# Patient Record
Sex: FEMALE | Race: WHITE | NOT HISPANIC OR LATINO | URBAN - METROPOLITAN AREA
[De-identification: names, ages, dates, MRNs, and addresses within clinical notes are randomized per-mention and may not be internally consistent; named-entity substitution may affect disease eponyms.]

---

## 2017-03-23 ENCOUNTER — EMERGENCY (EMERGENCY)
Facility: HOSPITAL | Age: 77
LOS: 1 days | Discharge: HOME | End: 2017-03-23
Attending: EMERGENCY MEDICINE | Admitting: EMERGENCY MEDICINE
Payer: MEDICARE

## 2017-03-23 VITALS
RESPIRATION RATE: 20 BRPM | SYSTOLIC BLOOD PRESSURE: 175 MMHG | HEART RATE: 100 BPM | DIASTOLIC BLOOD PRESSURE: 97 MMHG | WEIGHT: 139.99 LBS | OXYGEN SATURATION: 100 %

## 2017-03-23 VITALS
TEMPERATURE: 98 F | DIASTOLIC BLOOD PRESSURE: 80 MMHG | RESPIRATION RATE: 18 BRPM | SYSTOLIC BLOOD PRESSURE: 165 MMHG | HEART RATE: 95 BPM | OXYGEN SATURATION: 100 %

## 2017-03-23 DIAGNOSIS — R06.02 SHORTNESS OF BREATH: ICD-10-CM

## 2017-03-23 DIAGNOSIS — Z87.891 PERSONAL HISTORY OF NICOTINE DEPENDENCE: ICD-10-CM

## 2017-03-23 DIAGNOSIS — J40 BRONCHITIS, NOT SPECIFIED AS ACUTE OR CHRONIC: ICD-10-CM

## 2017-03-23 LAB
ALBUMIN SERPL ELPH-MCNC: 4.4 G/DL — SIGNIFICANT CHANGE UP (ref 3.3–5)
ALP SERPL-CCNC: 109 U/L — SIGNIFICANT CHANGE UP (ref 40–120)
ALT FLD-CCNC: 21 U/L — SIGNIFICANT CHANGE UP (ref 12–78)
ANION GAP SERPL CALC-SCNC: 12 MMOL/L — SIGNIFICANT CHANGE UP (ref 5–17)
AST SERPL-CCNC: 28 U/L — SIGNIFICANT CHANGE UP (ref 15–37)
BASOPHILS # BLD AUTO: 0 K/UL — SIGNIFICANT CHANGE UP (ref 0–0.2)
BASOPHILS NFR BLD AUTO: 0.4 % — SIGNIFICANT CHANGE UP (ref 0–2)
BILIRUB SERPL-MCNC: 0.7 MG/DL — SIGNIFICANT CHANGE UP (ref 0.2–1.2)
BUN SERPL-MCNC: 22 MG/DL — SIGNIFICANT CHANGE UP (ref 7–23)
CALCIUM SERPL-MCNC: 9.5 MG/DL — SIGNIFICANT CHANGE UP (ref 8.5–10.1)
CHLORIDE SERPL-SCNC: 106 MMOL/L — SIGNIFICANT CHANGE UP (ref 96–108)
CO2 SERPL-SCNC: 24 MMOL/L — SIGNIFICANT CHANGE UP (ref 22–31)
CREAT SERPL-MCNC: 0.61 MG/DL — SIGNIFICANT CHANGE UP (ref 0.5–1.3)
EOSINOPHIL # BLD AUTO: 0.1 K/UL — SIGNIFICANT CHANGE UP (ref 0–0.5)
EOSINOPHIL NFR BLD AUTO: 1.1 % — SIGNIFICANT CHANGE UP (ref 0–6)
GLUCOSE SERPL-MCNC: 91 MG/DL — SIGNIFICANT CHANGE UP (ref 70–99)
HCT VFR BLD CALC: 46.7 % — HIGH (ref 34.5–45)
HGB BLD-MCNC: 15.4 G/DL — SIGNIFICANT CHANGE UP (ref 11.5–15.5)
LYMPHOCYTES # BLD AUTO: 0.9 K/UL — LOW (ref 1–3.3)
LYMPHOCYTES # BLD AUTO: 8 % — LOW (ref 13–44)
MCHC RBC-ENTMCNC: 31.3 PG — SIGNIFICANT CHANGE UP (ref 27–34)
MCHC RBC-ENTMCNC: 32.9 GM/DL — SIGNIFICANT CHANGE UP (ref 32–36)
MCV RBC AUTO: 95.1 FL — SIGNIFICANT CHANGE UP (ref 80–100)
MONOCYTES # BLD AUTO: 0.7 K/UL — SIGNIFICANT CHANGE UP (ref 0–0.9)
MONOCYTES NFR BLD AUTO: 6.3 % — SIGNIFICANT CHANGE UP (ref 1–9)
NEUTROPHILS # BLD AUTO: 9 K/UL — HIGH (ref 1.8–7.4)
NEUTROPHILS NFR BLD AUTO: 84.2 % — HIGH (ref 43–77)
PLATELET # BLD AUTO: 213 K/UL — SIGNIFICANT CHANGE UP (ref 150–400)
POTASSIUM SERPL-MCNC: 4 MMOL/L — SIGNIFICANT CHANGE UP (ref 3.5–5.3)
POTASSIUM SERPL-SCNC: 4 MMOL/L — SIGNIFICANT CHANGE UP (ref 3.5–5.3)
PROT SERPL-MCNC: 7.9 G/DL — SIGNIFICANT CHANGE UP (ref 6–8.3)
RBC # BLD: 4.91 M/UL — SIGNIFICANT CHANGE UP (ref 3.8–5.2)
RBC # FLD: 11.9 % — SIGNIFICANT CHANGE UP (ref 10.3–14.5)
SODIUM SERPL-SCNC: 142 MMOL/L — SIGNIFICANT CHANGE UP (ref 135–145)
WBC # BLD: 10.7 K/UL — HIGH (ref 3.8–10.5)
WBC # FLD AUTO: 10.7 K/UL — HIGH (ref 3.8–10.5)

## 2017-03-23 PROCEDURE — 96367 TX/PROPH/DG ADDL SEQ IV INF: CPT

## 2017-03-23 PROCEDURE — 99285 EMERGENCY DEPT VISIT HI MDM: CPT

## 2017-03-23 PROCEDURE — 96365 THER/PROPH/DIAG IV INF INIT: CPT

## 2017-03-23 PROCEDURE — 80053 COMPREHEN METABOLIC PANEL: CPT

## 2017-03-23 PROCEDURE — 85027 COMPLETE CBC AUTOMATED: CPT

## 2017-03-23 PROCEDURE — 94640 AIRWAY INHALATION TREATMENT: CPT

## 2017-03-23 PROCEDURE — 71045 X-RAY EXAM CHEST 1 VIEW: CPT

## 2017-03-23 PROCEDURE — 71010: CPT | Mod: 26

## 2017-03-23 PROCEDURE — 99284 EMERGENCY DEPT VISIT MOD MDM: CPT | Mod: 25

## 2017-03-23 RX ORDER — IPRATROPIUM BROMIDE 0.2 MG/ML
500 SOLUTION, NON-ORAL INHALATION ONCE
Qty: 0 | Refills: 0 | Status: COMPLETED | OUTPATIENT
Start: 2017-03-23 | End: 2017-03-23

## 2017-03-23 RX ORDER — ALBUTEROL 90 UG/1
2.5 AEROSOL, METERED ORAL ONCE
Qty: 0 | Refills: 0 | Status: COMPLETED | OUTPATIENT
Start: 2017-03-23 | End: 2017-03-23

## 2017-03-23 RX ORDER — ALBUTEROL 90 UG/1
1 AEROSOL, METERED ORAL
Qty: 84 | Refills: 0
Start: 2017-03-23 | End: 2017-04-06

## 2017-03-23 RX ORDER — FLUTICASONE PROPIONATE 220 MCG
2 AEROSOL WITH ADAPTER (GRAM) INHALATION
Qty: 56 | Refills: 0
Start: 2017-03-23 | End: 2017-04-06

## 2017-03-23 RX ORDER — AZITHROMYCIN 500 MG/1
1 TABLET, FILM COATED ORAL
Qty: 4 | Refills: 0 | OUTPATIENT
Start: 2017-03-23 | End: 2017-03-27

## 2017-03-23 RX ORDER — IPRATROPIUM/ALBUTEROL SULFATE 18-103MCG
3 AEROSOL WITH ADAPTER (GRAM) INHALATION ONCE
Qty: 0 | Refills: 0 | Status: COMPLETED | OUTPATIENT
Start: 2017-03-23 | End: 2017-03-23

## 2017-03-23 RX ORDER — CEFTRIAXONE 500 MG/1
1 INJECTION, POWDER, FOR SOLUTION INTRAMUSCULAR; INTRAVENOUS ONCE
Qty: 0 | Refills: 0 | Status: COMPLETED | OUTPATIENT
Start: 2017-03-23 | End: 2017-03-23

## 2017-03-23 RX ORDER — AZITHROMYCIN 500 MG/1
500 TABLET, FILM COATED ORAL ONCE
Qty: 0 | Refills: 0 | Status: COMPLETED | OUTPATIENT
Start: 2017-03-23 | End: 2017-03-23

## 2017-03-23 RX ADMIN — ALBUTEROL 2.5 MILLIGRAM(S): 90 AEROSOL, METERED ORAL at 19:58

## 2017-03-23 RX ADMIN — Medication 500 MICROGRAM(S): at 19:57

## 2017-03-23 RX ADMIN — Medication 3 MILLILITER(S): at 19:58

## 2017-03-23 RX ADMIN — Medication 3 MILLILITER(S): at 19:57

## 2017-03-23 RX ADMIN — AZITHROMYCIN 255 MILLIGRAM(S): 500 TABLET, FILM COATED ORAL at 19:59

## 2017-03-23 RX ADMIN — CEFTRIAXONE 100 GRAM(S): 500 INJECTION, POWDER, FOR SOLUTION INTRAMUSCULAR; INTRAVENOUS at 19:59

## 2017-03-23 NOTE — ED ADULT NURSE NOTE - OBJECTIVE STATEMENT
brought in by ems s/p sudden onset of sob.  as per ems patient is much better now.  speaking in full sentences.  mild expiratory wheeze.  md assessing patient

## 2017-03-23 NOTE — ED PROVIDER NOTE - PROGRESS NOTE DETAILS
All results were explained to patient and/or family and a copy of all available results given.  Pt felt better.

## 2017-03-27 ENCOUNTER — INPATIENT (INPATIENT)
Facility: HOSPITAL | Age: 77
LOS: 2 days | Discharge: HOME | DRG: 203 | End: 2017-03-30
Attending: INTERNAL MEDICINE | Admitting: INTERNAL MEDICINE
Payer: MEDICARE

## 2017-03-27 VITALS
WEIGHT: 110.01 LBS | OXYGEN SATURATION: 97 % | SYSTOLIC BLOOD PRESSURE: 163 MMHG | TEMPERATURE: 97 F | DIASTOLIC BLOOD PRESSURE: 80 MMHG | RESPIRATION RATE: 22 BRPM | HEIGHT: 62 IN | HEART RATE: 76 BPM

## 2017-03-27 DIAGNOSIS — I10 ESSENTIAL (PRIMARY) HYPERTENSION: ICD-10-CM

## 2017-03-27 LAB
ALBUMIN SERPL ELPH-MCNC: 4 G/DL — SIGNIFICANT CHANGE UP (ref 3.3–5)
ALP SERPL-CCNC: 94 U/L — SIGNIFICANT CHANGE UP (ref 40–120)
ALT FLD-CCNC: 23 U/L — SIGNIFICANT CHANGE UP (ref 12–78)
ANION GAP SERPL CALC-SCNC: 11 MMOL/L — SIGNIFICANT CHANGE UP (ref 5–17)
AST SERPL-CCNC: 17 U/L — SIGNIFICANT CHANGE UP (ref 15–37)
BASOPHILS # BLD AUTO: 0.1 K/UL — SIGNIFICANT CHANGE UP (ref 0–0.2)
BASOPHILS NFR BLD AUTO: 0.8 % — SIGNIFICANT CHANGE UP (ref 0–2)
BILIRUB SERPL-MCNC: 0.6 MG/DL — SIGNIFICANT CHANGE UP (ref 0.2–1.2)
BUN SERPL-MCNC: 23 MG/DL — SIGNIFICANT CHANGE UP (ref 7–23)
CALCIUM SERPL-MCNC: 9.5 MG/DL — SIGNIFICANT CHANGE UP (ref 8.5–10.1)
CHLORIDE SERPL-SCNC: 106 MMOL/L — SIGNIFICANT CHANGE UP (ref 96–108)
CK SERPL-CCNC: 49 U/L — SIGNIFICANT CHANGE UP (ref 26–192)
CO2 SERPL-SCNC: 25 MMOL/L — SIGNIFICANT CHANGE UP (ref 22–31)
CREAT SERPL-MCNC: 0.78 MG/DL — SIGNIFICANT CHANGE UP (ref 0.5–1.3)
EOSINOPHIL # BLD AUTO: 0.1 K/UL — SIGNIFICANT CHANGE UP (ref 0–0.5)
EOSINOPHIL NFR BLD AUTO: 0.8 % — SIGNIFICANT CHANGE UP (ref 0–6)
GLUCOSE SERPL-MCNC: 91 MG/DL — SIGNIFICANT CHANGE UP (ref 70–99)
HCT VFR BLD CALC: 47.2 % — HIGH (ref 34.5–45)
HGB BLD-MCNC: 15.5 G/DL — SIGNIFICANT CHANGE UP (ref 11.5–15.5)
LYMPHOCYTES # BLD AUTO: 3.6 K/UL — HIGH (ref 1–3.3)
LYMPHOCYTES # BLD AUTO: 35.8 % — SIGNIFICANT CHANGE UP (ref 13–44)
MCHC RBC-ENTMCNC: 30.5 PG — SIGNIFICANT CHANGE UP (ref 27–34)
MCHC RBC-ENTMCNC: 32.8 GM/DL — SIGNIFICANT CHANGE UP (ref 32–36)
MCV RBC AUTO: 93 FL — SIGNIFICANT CHANGE UP (ref 80–100)
MONOCYTES # BLD AUTO: 0.9 K/UL — SIGNIFICANT CHANGE UP (ref 0–0.9)
MONOCYTES NFR BLD AUTO: 8.9 % — SIGNIFICANT CHANGE UP (ref 1–9)
NEUTROPHILS # BLD AUTO: 5.3 K/UL — SIGNIFICANT CHANGE UP (ref 1.8–7.4)
NEUTROPHILS NFR BLD AUTO: 53.6 % — SIGNIFICANT CHANGE UP (ref 43–77)
PLATELET # BLD AUTO: 264 K/UL — SIGNIFICANT CHANGE UP (ref 150–400)
POTASSIUM SERPL-MCNC: 3.4 MMOL/L — LOW (ref 3.5–5.3)
POTASSIUM SERPL-SCNC: 3.4 MMOL/L — LOW (ref 3.5–5.3)
PROT SERPL-MCNC: 7.1 G/DL — SIGNIFICANT CHANGE UP (ref 6–8.3)
RBC # BLD: 5.07 M/UL — SIGNIFICANT CHANGE UP (ref 3.8–5.2)
RBC # FLD: 11.8 % — SIGNIFICANT CHANGE UP (ref 10.3–14.5)
SODIUM SERPL-SCNC: 142 MMOL/L — SIGNIFICANT CHANGE UP (ref 135–145)
TROPONIN I SERPL-MCNC: 0.07 NG/ML — HIGH (ref 0.01–0.04)
WBC # BLD: 10 K/UL — SIGNIFICANT CHANGE UP (ref 3.8–10.5)
WBC # FLD AUTO: 10 K/UL — SIGNIFICANT CHANGE UP (ref 3.8–10.5)

## 2017-03-27 PROCEDURE — 93010 ELECTROCARDIOGRAM REPORT: CPT

## 2017-03-27 RX ORDER — ASPIRIN/CALCIUM CARB/MAGNESIUM 325 MG
325 TABLET ORAL ONCE
Qty: 0 | Refills: 0 | Status: COMPLETED | OUTPATIENT
Start: 2017-03-27 | End: 2017-03-27

## 2017-03-27 NOTE — ED ADULT NURSE NOTE - OBJECTIVE STATEMENT
pt states she developed "shakes and palpitations " after taking 2pm flovent inhaler dose. stated symptoms persisted until 1800. pt denies shortness of breath at this time.

## 2017-03-27 NOTE — ED PROVIDER NOTE - CARE PLAN
Principal Discharge DX:	Palpitations  Secondary Diagnosis:	Shortness of breath Principal Discharge DX:	NSTEMI (non-ST elevated myocardial infarction)  Secondary Diagnosis:	Shortness of breath

## 2017-03-27 NOTE — ED PROVIDER NOTE - OBJECTIVE STATEMENT
A 76 year old female came to the ED SOB, lightheadedness, chills and A 76 year old female with a pmh of asthma came to the ED with SOB, lightheadedness, chills and an irregular heartbeat. The patient was in the ED 4 days ago with SOB and was prescribed steroids, a zpack and an inhaler. The patient states her symptoms were improving, until she took her flovent inhaler today and began feeling the symptoms she presented with. A 76 year old female with a pmh of asthma came to the ED with SOB, lightheadedness, chills and an irregular heartbeat. The patient was in the ED 4 days ago with SOB and was prescribed steroids, a zpack and an inhaler. The patient states her symptoms were improving, until she took her flovent inhaler today and began feeling the symptoms she presented with. The patient also complains of a lack of appetite She denies fevers, chest pain, N/V/D/C, abdominal pain.

## 2017-03-27 NOTE — ED PROVIDER NOTE - PROGRESS NOTE DETAILS
patient to be seen by cardiology Dr. Alejo, aware of positive troponin pt seen by dr armirez, second CE is higher than the first, so pt will need admission. dr ramirez aware

## 2017-03-28 DIAGNOSIS — J45.909 UNSPECIFIED ASTHMA, UNCOMPLICATED: ICD-10-CM

## 2017-03-28 DIAGNOSIS — I21.4 NON-ST ELEVATION (NSTEMI) MYOCARDIAL INFARCTION: ICD-10-CM

## 2017-03-28 LAB
ALBUMIN SERPL ELPH-MCNC: 3.4 G/DL — SIGNIFICANT CHANGE UP (ref 3.3–5)
ALP SERPL-CCNC: 78 U/L — SIGNIFICANT CHANGE UP (ref 40–120)
ALT FLD-CCNC: 19 U/L — SIGNIFICANT CHANGE UP (ref 12–78)
ANION GAP SERPL CALC-SCNC: 9 MMOL/L — SIGNIFICANT CHANGE UP (ref 5–17)
AST SERPL-CCNC: 14 U/L — LOW (ref 15–37)
BILIRUB SERPL-MCNC: 0.6 MG/DL — SIGNIFICANT CHANGE UP (ref 0.2–1.2)
BUN SERPL-MCNC: 21 MG/DL — SIGNIFICANT CHANGE UP (ref 7–23)
CALCIUM SERPL-MCNC: 8.4 MG/DL — LOW (ref 8.5–10.1)
CHLORIDE SERPL-SCNC: 108 MMOL/L — SIGNIFICANT CHANGE UP (ref 96–108)
CHOLEST SERPL-MCNC: 214 MG/DL — HIGH (ref 10–199)
CK MB BLD-MCNC: 4.6 % — HIGH (ref 0–3.5)
CK MB CFR SERPL CALC: 1.9 NG/ML — SIGNIFICANT CHANGE UP (ref 0–3.6)
CK SERPL-CCNC: 38 U/L — SIGNIFICANT CHANGE UP (ref 26–192)
CK SERPL-CCNC: 41 U/L — SIGNIFICANT CHANGE UP (ref 26–192)
CO2 SERPL-SCNC: 26 MMOL/L — SIGNIFICANT CHANGE UP (ref 22–31)
CREAT SERPL-MCNC: 0.75 MG/DL — SIGNIFICANT CHANGE UP (ref 0.5–1.3)
GLUCOSE SERPL-MCNC: 86 MG/DL — SIGNIFICANT CHANGE UP (ref 70–99)
HCT VFR BLD CALC: 40.6 % — SIGNIFICANT CHANGE UP (ref 34.5–45)
HDLC SERPL-MCNC: 64 MG/DL — SIGNIFICANT CHANGE UP (ref 40–125)
HGB BLD-MCNC: 13.4 G/DL — SIGNIFICANT CHANGE UP (ref 11.5–15.5)
INR BLD: 1.13 RATIO — SIGNIFICANT CHANGE UP (ref 0.88–1.16)
LIPID PNL WITH DIRECT LDL SERPL: 135 MG/DL — HIGH
MAGNESIUM SERPL-MCNC: 2.1 MG/DL — SIGNIFICANT CHANGE UP (ref 1.8–2.4)
MCHC RBC-ENTMCNC: 30.7 PG — SIGNIFICANT CHANGE UP (ref 27–34)
MCHC RBC-ENTMCNC: 33 GM/DL — SIGNIFICANT CHANGE UP (ref 32–36)
MCV RBC AUTO: 93.2 FL — SIGNIFICANT CHANGE UP (ref 80–100)
PLATELET # BLD AUTO: 211 K/UL — SIGNIFICANT CHANGE UP (ref 150–400)
POTASSIUM SERPL-MCNC: 3.8 MMOL/L — SIGNIFICANT CHANGE UP (ref 3.5–5.3)
POTASSIUM SERPL-SCNC: 3.8 MMOL/L — SIGNIFICANT CHANGE UP (ref 3.5–5.3)
PROCALCITONIN SERPL-MCNC: <0.05 NG/ML — SIGNIFICANT CHANGE UP (ref 0–0.05)
PROT SERPL-MCNC: 6.1 G/DL — SIGNIFICANT CHANGE UP (ref 6–8.3)
PROTHROM AB SERPL-ACNC: 12.4 SEC — SIGNIFICANT CHANGE UP (ref 9.8–12.7)
RAPID RVP RESULT: SIGNIFICANT CHANGE UP
RBC # BLD: 4.36 M/UL — SIGNIFICANT CHANGE UP (ref 3.8–5.2)
RBC # FLD: 11.8 % — SIGNIFICANT CHANGE UP (ref 10.3–14.5)
SALICYLATES SERPL-MCNC: 2.6 MG/DL — LOW (ref 2.8–20)
SODIUM SERPL-SCNC: 143 MMOL/L — SIGNIFICANT CHANGE UP (ref 135–145)
TOTAL CHOLESTEROL/HDL RATIO MEASUREMENT: 3.3 RATIO — SIGNIFICANT CHANGE UP (ref 3.3–7.1)
TRIGL SERPL-MCNC: 76 MG/DL — SIGNIFICANT CHANGE UP (ref 10–149)
TROPONIN I SERPL-MCNC: 0.08 NG/ML — HIGH (ref 0.01–0.04)
TROPONIN I SERPL-MCNC: 0.09 NG/ML — HIGH (ref 0.01–0.04)
TSH SERPL-MCNC: 2.28 UIU/ML — SIGNIFICANT CHANGE UP (ref 0.36–3.74)
WBC # BLD: 7.8 K/UL — SIGNIFICANT CHANGE UP (ref 3.8–10.5)
WBC # FLD AUTO: 7.8 K/UL — SIGNIFICANT CHANGE UP (ref 3.8–10.5)

## 2017-03-28 PROCEDURE — 71010: CPT | Mod: 26

## 2017-03-28 PROCEDURE — 93010 ELECTROCARDIOGRAM REPORT: CPT

## 2017-03-28 PROCEDURE — 93970 EXTREMITY STUDY: CPT | Mod: 26

## 2017-03-28 PROCEDURE — 99285 EMERGENCY DEPT VISIT HI MDM: CPT

## 2017-03-28 RX ORDER — ENOXAPARIN SODIUM 100 MG/ML
50 INJECTION SUBCUTANEOUS EVERY 12 HOURS
Qty: 0 | Refills: 0 | Status: DISCONTINUED | OUTPATIENT
Start: 2017-03-28 | End: 2017-03-30

## 2017-03-28 RX ORDER — ASPIRIN/CALCIUM CARB/MAGNESIUM 324 MG
325 TABLET ORAL DAILY
Qty: 0 | Refills: 0 | Status: DISCONTINUED | OUTPATIENT
Start: 2017-03-28 | End: 2017-03-30

## 2017-03-28 RX ORDER — POTASSIUM CHLORIDE 20 MEQ
40 PACKET (EA) ORAL ONCE
Qty: 0 | Refills: 0 | Status: COMPLETED | OUTPATIENT
Start: 2017-03-28 | End: 2017-03-28

## 2017-03-28 RX ORDER — METOPROLOL TARTRATE 50 MG
12.5 TABLET ORAL THREE TIMES A DAY
Qty: 0 | Refills: 0 | Status: DISCONTINUED | OUTPATIENT
Start: 2017-03-28 | End: 2017-03-30

## 2017-03-28 RX ORDER — ALBUTEROL 90 UG/1
2 AEROSOL, METERED ORAL EVERY 6 HOURS
Qty: 0 | Refills: 0 | Status: DISCONTINUED | OUTPATIENT
Start: 2017-03-28 | End: 2017-03-28

## 2017-03-28 RX ORDER — PANTOPRAZOLE SODIUM 20 MG/1
40 TABLET, DELAYED RELEASE ORAL
Qty: 0 | Refills: 0 | Status: DISCONTINUED | OUTPATIENT
Start: 2017-03-28 | End: 2017-03-30

## 2017-03-28 RX ORDER — ATORVASTATIN CALCIUM 80 MG/1
20 TABLET, FILM COATED ORAL AT BEDTIME
Qty: 0 | Refills: 0 | Status: DISCONTINUED | OUTPATIENT
Start: 2017-03-28 | End: 2017-03-30

## 2017-03-28 RX ORDER — IPRATROPIUM/ALBUTEROL SULFATE 18-103MCG
3 AEROSOL WITH ADAPTER (GRAM) INHALATION
Qty: 0 | Refills: 0 | Status: DISCONTINUED | OUTPATIENT
Start: 2017-03-28 | End: 2017-03-30

## 2017-03-28 RX ORDER — BUDESONIDE, MICRONIZED 100 %
0.5 POWDER (GRAM) MISCELLANEOUS
Qty: 0 | Refills: 0 | Status: DISCONTINUED | OUTPATIENT
Start: 2017-03-28 | End: 2017-03-30

## 2017-03-28 RX ADMIN — Medication 40 MILLIEQUIVALENT(S): at 02:06

## 2017-03-28 RX ADMIN — Medication 12.5 MILLIGRAM(S): at 05:26

## 2017-03-28 RX ADMIN — ENOXAPARIN SODIUM 50 MILLIGRAM(S): 100 INJECTION SUBCUTANEOUS at 17:24

## 2017-03-28 RX ADMIN — Medication 12.5 MILLIGRAM(S): at 21:57

## 2017-03-28 RX ADMIN — ENOXAPARIN SODIUM 50 MILLIGRAM(S): 100 INJECTION SUBCUTANEOUS at 05:25

## 2017-03-28 RX ADMIN — Medication 0.5 MILLIGRAM(S): at 21:08

## 2017-03-28 RX ADMIN — Medication 325 MILLIGRAM(S): at 02:06

## 2017-03-28 RX ADMIN — Medication 3 MILLILITER(S): at 21:07

## 2017-03-28 RX ADMIN — PANTOPRAZOLE SODIUM 40 MILLIGRAM(S): 20 TABLET, DELAYED RELEASE ORAL at 05:26

## 2017-03-28 RX ADMIN — Medication 40 MILLIGRAM(S): at 22:01

## 2017-03-28 RX ADMIN — Medication 3 MILLILITER(S): at 15:25

## 2017-03-28 RX ADMIN — Medication 12.5 MILLIGRAM(S): at 13:22

## 2017-03-28 RX ADMIN — ATORVASTATIN CALCIUM 20 MILLIGRAM(S): 80 TABLET, FILM COATED ORAL at 21:58

## 2017-03-28 RX ADMIN — Medication 325 MILLIGRAM(S): at 11:19

## 2017-03-28 NOTE — H&P ADULT. - ASSESSMENT
76 year old female with a pmh of asthma came to the ED with SOB, lightheadedness, chills and an irregular heartbeat. The patient was in the ED 4 days ago with SOB and was prescribed steroids, a zpack and an inhaler. The patient states her symptoms were improving, until she took her flovent inhaler today and began feeling the symptoms she presented with. The patient also complains of a lack of appetite She denies fevers, chest pain, N/V/D/C, abdominal pain.

## 2017-03-28 NOTE — PATIENT PROFILE ADULT. - CAREGIVER ADDRESS
[address] [facility], [address] [facility], [place] [address]

## 2017-03-29 DIAGNOSIS — R06.02 SHORTNESS OF BREATH: ICD-10-CM

## 2017-03-29 LAB
ALBUMIN SERPL ELPH-MCNC: 3.6 G/DL — SIGNIFICANT CHANGE UP (ref 3.3–5)
ALP SERPL-CCNC: 87 U/L — SIGNIFICANT CHANGE UP (ref 40–120)
ALT FLD-CCNC: 20 U/L — SIGNIFICANT CHANGE UP (ref 12–78)
ANION GAP SERPL CALC-SCNC: 8 MMOL/L — SIGNIFICANT CHANGE UP (ref 5–17)
AST SERPL-CCNC: 8 U/L — LOW (ref 15–37)
BILIRUB SERPL-MCNC: 0.5 MG/DL — SIGNIFICANT CHANGE UP (ref 0.2–1.2)
BUN SERPL-MCNC: 21 MG/DL — SIGNIFICANT CHANGE UP (ref 7–23)
CALCIUM SERPL-MCNC: 8.6 MG/DL — SIGNIFICANT CHANGE UP (ref 8.5–10.1)
CHLORIDE SERPL-SCNC: 107 MMOL/L — SIGNIFICANT CHANGE UP (ref 96–108)
CHOLEST SERPL-MCNC: 235 MG/DL — HIGH (ref 10–199)
CO2 SERPL-SCNC: 25 MMOL/L — SIGNIFICANT CHANGE UP (ref 22–31)
CREAT SERPL-MCNC: 0.7 MG/DL — SIGNIFICANT CHANGE UP (ref 0.5–1.3)
GLUCOSE SERPL-MCNC: 131 MG/DL — HIGH (ref 70–99)
HCT VFR BLD CALC: 42.7 % — SIGNIFICANT CHANGE UP (ref 34.5–45)
HDLC SERPL-MCNC: 73 MG/DL — SIGNIFICANT CHANGE UP (ref 40–125)
HGB BLD-MCNC: 14.4 G/DL — SIGNIFICANT CHANGE UP (ref 11.5–15.5)
INR BLD: 1.09 RATIO — SIGNIFICANT CHANGE UP (ref 0.88–1.16)
LIPID PNL WITH DIRECT LDL SERPL: 150 MG/DL — HIGH
MAGNESIUM SERPL-MCNC: 2.2 MG/DL — SIGNIFICANT CHANGE UP (ref 1.8–2.4)
MCHC RBC-ENTMCNC: 31.3 PG — SIGNIFICANT CHANGE UP (ref 27–34)
MCHC RBC-ENTMCNC: 33.9 GM/DL — SIGNIFICANT CHANGE UP (ref 32–36)
MCV RBC AUTO: 92.5 FL — SIGNIFICANT CHANGE UP (ref 80–100)
PHOSPHATE SERPL-MCNC: 2.9 MG/DL — SIGNIFICANT CHANGE UP (ref 2.5–4.5)
PLATELET # BLD AUTO: 232 K/UL — SIGNIFICANT CHANGE UP (ref 150–400)
POTASSIUM SERPL-MCNC: 4.3 MMOL/L — SIGNIFICANT CHANGE UP (ref 3.5–5.3)
POTASSIUM SERPL-SCNC: 4.3 MMOL/L — SIGNIFICANT CHANGE UP (ref 3.5–5.3)
PROT SERPL-MCNC: 6.8 G/DL — SIGNIFICANT CHANGE UP (ref 6–8.3)
PROTHROM AB SERPL-ACNC: 11.9 SEC — SIGNIFICANT CHANGE UP (ref 9.8–12.7)
RBC # BLD: 4.62 M/UL — SIGNIFICANT CHANGE UP (ref 3.8–5.2)
RBC # FLD: 11.8 % — SIGNIFICANT CHANGE UP (ref 10.3–14.5)
SODIUM SERPL-SCNC: 140 MMOL/L — SIGNIFICANT CHANGE UP (ref 135–145)
TOTAL CHOLESTEROL/HDL RATIO MEASUREMENT: 3.2 RATIO — LOW (ref 3.3–7.1)
TRIGL SERPL-MCNC: 59 MG/DL — SIGNIFICANT CHANGE UP (ref 10–149)
TROPONIN I SERPL-MCNC: 0.03 NG/ML — SIGNIFICANT CHANGE UP (ref 0.01–0.04)
WBC # BLD: 5.5 K/UL — SIGNIFICANT CHANGE UP (ref 3.8–10.5)
WBC # FLD AUTO: 5.5 K/UL — SIGNIFICANT CHANGE UP (ref 3.8–10.5)

## 2017-03-29 RX ADMIN — ENOXAPARIN SODIUM 50 MILLIGRAM(S): 100 INJECTION SUBCUTANEOUS at 17:50

## 2017-03-29 RX ADMIN — Medication 325 MILLIGRAM(S): at 11:21

## 2017-03-29 RX ADMIN — PANTOPRAZOLE SODIUM 40 MILLIGRAM(S): 20 TABLET, DELAYED RELEASE ORAL at 06:55

## 2017-03-29 RX ADMIN — Medication 12.5 MILLIGRAM(S): at 21:18

## 2017-03-29 RX ADMIN — ATORVASTATIN CALCIUM 20 MILLIGRAM(S): 80 TABLET, FILM COATED ORAL at 21:17

## 2017-03-29 RX ADMIN — Medication 20 MILLIGRAM(S): at 21:18

## 2017-03-29 RX ADMIN — Medication 12.5 MILLIGRAM(S): at 06:54

## 2017-03-29 RX ADMIN — Medication 40 MILLIGRAM(S): at 06:54

## 2017-03-29 RX ADMIN — ENOXAPARIN SODIUM 50 MILLIGRAM(S): 100 INJECTION SUBCUTANEOUS at 06:54

## 2017-03-29 RX ADMIN — Medication 12.5 MILLIGRAM(S): at 16:09

## 2017-03-29 NOTE — PHYSICAL THERAPY INITIAL EVALUATION ADULT - ADDITIONAL COMMENTS
Pt seems like a poor historian. Pt states that she lives alone and has 15 steps to her bedroom. + driving

## 2017-03-29 NOTE — PROGRESS NOTE ADULT - SUBJECTIVE AND OBJECTIVE BOX
Patient seen and examined. Chart reviewed. Detailed note(s) to follow.         Patient: ANIRUDH RICHARDSON  Female  [id number]    Pulmonary/Critical Care Note Dr Claudio Shafer (Ph )    REVIEW OF SYSTEMS:  Eyes: No eye pain, visual disturbances, or discharge  ENT:  No difficulty hearing, tinnitus, vertigo; No sinus or throat pain  Neck: No pain or stiffness  Breasts: No pain, masses or nipple discharge  Genitourinary: Unremarkable for dysuria, frequency, hematuria or incontinence  Rectal: Unremarkable for pain, hemorrhoids or incontinence  Neurological: No new deficits  Unremarkable for headaches, memory loss, loss of strength, numbness or tremors  Skin: Unremarkable for itching, burning, rashes or lesions   Lymph Nodes: No enlarged glands  Endocrine: Unremarkable for heat or cold intolerance; No hair loss  Musculoskeletal: Unremarkable for joint pain or swelling; No muscle, back or extremity pain  Psychiatric: Unremarkable for depression, anxiety, mood swings or difficulty sleeping  Heme/Lymph: Unremarkable for easy bruising or bleeding gums  Allergy and Immunologic: No hives or eczema  Respiratory: No cough, wheezing, chills or hemoptysis Does have dyspnea worse on exertion  Cardiovascular: No chest pain, palpitations,  dizziness   Constitutional: No fever, weight loss or fatigue  Gastrointestinal: No diarrhea or constipation. No melena or hematochezia.    PHYSICAL EXAM:    Constitutional: NAD,   HEENT: PERRLA, EOMI, Normal Hearing, MMM  Neck: No LAD, No JVD  Back: Normal spine flexure, No CVA tenderness   Cardiovascular: S1 and S2, RRR, no M/G/R  Gastrointestinal: BS+, soft, NT/ND  Vascular: 2+ peripheral pulses  Skin: No rashes  Respiratory: Harsh breath sounds Expiration prolonged   Neurological: Sl confused   Extremities: No peripheral edema    T(C): 36.6, Max: 37.1 (03-29 @ 04:55)  HR: 83 (68 - 83)  BP: 102/67 (102/67 - 151/82)  RR: 18 (16 - 18)  SpO2: 97% (96% - 98%)  Wt(kg): --

## 2017-03-29 NOTE — PROGRESS NOTE ADULT - SUBJECTIVE AND OBJECTIVE BOX
Patient is a 76y old  Female who presents with a chief complaint of sob (28 Mar 2017 08:16)      INTERVAL HPI/OVERNIGHT EVENTS:  T(C): 37.1, Max: 37.1 (03-28 @ 16:06)  HR: 71 (64 - 74)  BP: 131/83 (128/71 - 148/80)  RR: 18 (16 - 18)  SpO2: 97% (96% - 99%)  Wt(kg): --  I&O's Summary    I & Os for current day (as of 29 Mar 2017 09:01)  =============================================  IN: 200 ml / OUT: 0 ml / NET: 200 ml      LABS:                        14.4   5.5   )-----------( 232      ( 29 Mar 2017 06:48 )             42.7     29 Mar 2017 06:48    140    |  107    |  21     ----------------------------<  131    4.3     |  25     |  0.70     Ca    8.6        29 Mar 2017 06:48  Mg     2.1       28 Mar 2017 04:35    TPro  6.8    /  Alb  3.6    /  TBili  0.5    /  DBili  x      /  AST  8      /  ALT  20     /  AlkPhos  87     29 Mar 2017 06:48    PT/INR - ( 29 Mar 2017 06:48 )   PT: 11.9 sec;   INR: 1.09 ratio             CAPILLARY BLOOD GLUCOSE            MEDICATIONS  (STANDING):  enoxaparin Injectable 50milliGRAM(s) SubCutaneous every 12 hours  pantoprazole    Tablet 40milliGRAM(s) Oral before breakfast  aspirin enteric coated 325milliGRAM(s) Oral daily  metoprolol 12.5milliGRAM(s) Oral three times a day  atorvastatin 20milliGRAM(s) Oral at bedtime  ALBUTerol/ipratropium for Nebulization 3milliLiter(s) Nebulizer four times a day  buDESOnide   0.5 milliGRAM(s) Respule 0.5milliGRAM(s) Inhalation two times a day  predniSONE   Tablet 20milliGRAM(s) Oral daily    MEDICATIONS  (PRN):      REVIEW OF SYSTEMS:  CONSTITUTIONAL: No fever, weight loss, or fatigue  EYES: No eye pain, visual disturbances, or discharge  ENMT:  No difficulty hearing, tinnitus, vertigo; No sinus or throat pain  NECK: No pain or stiffness  RESPIRATORY: No cough, wheezing, chills or hemoptysis; No shortness of breath  CARDIOVASCULAR: No chest pain, palpitations, dizziness, or leg swelling  GASTROINTESTINAL: No abdominal or epigastric pain. No nausea, vomiting, or hematemesis; No diarrhea or constipation. No melena or hematochezia.  GENITOURINARY: No dysuria, frequency, hematuria, or incontinence  NEUROLOGICAL: No headaches, memory loss, loss of strength, numbness, or tremors  SKIN: No itching, burning, rashes, or lesions   LYMPH NODES: No enlarged glands  ENDOCRINE: No heat or cold intolerance; No hair loss  MUSCULOSKELETAL: No joint pain or swelling; No muscle, back, or extremity pain  PSYCHIATRIC: No depression, anxiety, mood swings, or difficulty sleeping  HEME/LYMPH: No easy bruising, or bleeding gums  ALLERY AND IMMUNOLOGIC: No hives or eczema    RADIOLOGY & ADDITIONAL TESTS:    Imaging Personally Reviewed:  [ ] YES  [ ] NO    Consultant(s) Notes Reviewed:  [ *] YES  [ ] NO    PHYSICAL EXAM:  GENERAL: NAD, well-groomed, well-developed  HEAD:  Atraumatic, Normocephalic  EYES: EOMI, PERRLA, conjunctiva and sclera clear  ENMT: No tonsillar erythema, exudates, or enlargement; Moist mucous membranes, Good dentition, No lesions  NECK: Supple, No JVD, Normal thyroid  NERVOUS SYSTEM:  Alert & Oriented X3, Good concentration; Motor Strength 5/5 B/L upper and lower extremities; DTRs 2+ intact and symmetric  CHEST/LUNG: Clear to percussion bilaterally; No rales, rhonchi, wheezing, or rubs  HEART: Regular rate and rhythm; No murmurs, rubs, or gallops  ABDOMEN: Soft, Nontender, Nondistended; Bowel sounds present  EXTREMITIES:  2+ Peripheral Pulses, No clubbing, cyanosis, or edema  LYMPH: No lymphadenopathy noted  SKIN: No rashes or lesions    Care Discussed with Consultants/Other Providers [* ] YES  [ ] NO

## 2017-03-30 VITALS
DIASTOLIC BLOOD PRESSURE: 70 MMHG | SYSTOLIC BLOOD PRESSURE: 134 MMHG | HEART RATE: 67 BPM | TEMPERATURE: 98 F | RESPIRATION RATE: 18 BRPM | OXYGEN SATURATION: 96 %

## 2017-03-30 PROCEDURE — 82550 ASSAY OF CK (CPK): CPT

## 2017-03-30 PROCEDURE — 87040 BLOOD CULTURE FOR BACTERIA: CPT

## 2017-03-30 PROCEDURE — 84484 ASSAY OF TROPONIN QUANT: CPT

## 2017-03-30 PROCEDURE — 87798 DETECT AGENT NOS DNA AMP: CPT

## 2017-03-30 PROCEDURE — 84100 ASSAY OF PHOSPHORUS: CPT

## 2017-03-30 PROCEDURE — 83735 ASSAY OF MAGNESIUM: CPT

## 2017-03-30 PROCEDURE — 80053 COMPREHEN METABOLIC PANEL: CPT

## 2017-03-30 PROCEDURE — 84145 PROCALCITONIN (PCT): CPT

## 2017-03-30 PROCEDURE — 80061 LIPID PANEL: CPT

## 2017-03-30 PROCEDURE — 97162 PT EVAL MOD COMPLEX 30 MIN: CPT

## 2017-03-30 PROCEDURE — 93306 TTE W/DOPPLER COMPLETE: CPT

## 2017-03-30 PROCEDURE — 96372 THER/PROPH/DIAG INJ SC/IM: CPT | Mod: 59

## 2017-03-30 PROCEDURE — 84443 ASSAY THYROID STIM HORMONE: CPT

## 2017-03-30 PROCEDURE — 99285 EMERGENCY DEPT VISIT HI MDM: CPT | Mod: 25

## 2017-03-30 PROCEDURE — 87581 M.PNEUMON DNA AMP PROBE: CPT

## 2017-03-30 PROCEDURE — 71045 X-RAY EXAM CHEST 1 VIEW: CPT

## 2017-03-30 PROCEDURE — 85027 COMPLETE CBC AUTOMATED: CPT

## 2017-03-30 PROCEDURE — 94760 N-INVAS EAR/PLS OXIMETRY 1: CPT

## 2017-03-30 PROCEDURE — 80307 DRUG TEST PRSMV CHEM ANLYZR: CPT

## 2017-03-30 PROCEDURE — 82553 CREATINE MB FRACTION: CPT

## 2017-03-30 PROCEDURE — 87633 RESP VIRUS 12-25 TARGETS: CPT

## 2017-03-30 PROCEDURE — 87486 CHLMYD PNEUM DNA AMP PROBE: CPT

## 2017-03-30 PROCEDURE — 96376 TX/PRO/DX INJ SAME DRUG ADON: CPT

## 2017-03-30 PROCEDURE — 85610 PROTHROMBIN TIME: CPT

## 2017-03-30 PROCEDURE — G0378: CPT

## 2017-03-30 PROCEDURE — 93970 EXTREMITY STUDY: CPT

## 2017-03-30 PROCEDURE — 93005 ELECTROCARDIOGRAM TRACING: CPT

## 2017-03-30 PROCEDURE — 96374 THER/PROPH/DIAG INJ IV PUSH: CPT

## 2017-03-30 PROCEDURE — 94640 AIRWAY INHALATION TREATMENT: CPT

## 2017-03-30 RX ORDER — PANTOPRAZOLE SODIUM 20 MG/1
1 TABLET, DELAYED RELEASE ORAL
Qty: 30 | Refills: 0 | OUTPATIENT
Start: 2017-03-30 | End: 2017-04-29

## 2017-03-30 RX ORDER — METOPROLOL TARTRATE 50 MG
0.5 TABLET ORAL
Qty: 30 | Refills: 0 | OUTPATIENT
Start: 2017-03-30 | End: 2017-04-29

## 2017-03-30 RX ORDER — ATORVASTATIN CALCIUM 80 MG/1
1 TABLET, FILM COATED ORAL
Qty: 30 | Refills: 0 | OUTPATIENT
Start: 2017-03-30 | End: 2017-04-29

## 2017-03-30 RX ORDER — ENOXAPARIN SODIUM 100 MG/ML
40 INJECTION SUBCUTANEOUS DAILY
Qty: 0 | Refills: 0 | Status: DISCONTINUED | OUTPATIENT
Start: 2017-03-30 | End: 2017-03-30

## 2017-03-30 RX ORDER — PANTOPRAZOLE SODIUM 20 MG/1
1 TABLET, DELAYED RELEASE ORAL
Qty: 30 | Refills: 0
Start: 2017-03-30 | End: 2017-04-29

## 2017-03-30 RX ORDER — METOPROLOL TARTRATE 50 MG
12.5 TABLET ORAL
Qty: 0 | Refills: 0 | Status: DISCONTINUED | OUTPATIENT
Start: 2017-03-30 | End: 2017-03-30

## 2017-03-30 RX ORDER — ASPIRIN/CALCIUM CARB/MAGNESIUM 324 MG
1 TABLET ORAL
Qty: 30 | Refills: 0
Start: 2017-03-30 | End: 2017-04-29

## 2017-03-30 RX ORDER — ASPIRIN/CALCIUM CARB/MAGNESIUM 324 MG
1 TABLET ORAL
Qty: 30 | Refills: 0 | OUTPATIENT
Start: 2017-03-30 | End: 2017-04-29

## 2017-03-30 RX ORDER — ATORVASTATIN CALCIUM 80 MG/1
1 TABLET, FILM COATED ORAL
Qty: 30 | Refills: 0
Start: 2017-03-30 | End: 2017-04-29

## 2017-03-30 RX ORDER — METOPROLOL TARTRATE 50 MG
0.5 TABLET ORAL
Qty: 30 | Refills: 0
Start: 2017-03-30 | End: 2017-04-29

## 2017-03-30 RX ADMIN — Medication 3 MILLILITER(S): at 07:48

## 2017-03-30 RX ADMIN — PANTOPRAZOLE SODIUM 40 MILLIGRAM(S): 20 TABLET, DELAYED RELEASE ORAL at 06:06

## 2017-03-30 RX ADMIN — Medication 0.5 MILLIGRAM(S): at 07:48

## 2017-03-30 RX ADMIN — Medication 12.5 MILLIGRAM(S): at 06:06

## 2017-03-30 RX ADMIN — Medication 20 MILLIGRAM(S): at 06:06

## 2017-03-30 NOTE — DISCHARGE NOTE ADULT - PATIENT PORTAL LINK FT
“You can access the FollowHealth Patient Portal, offered by [facility], by registering with the following website: [email or web address]”

## 2017-03-30 NOTE — PROGRESS NOTE ADULT - SUBJECTIVE AND OBJECTIVE BOX
[facility] DON[facility].CMonique [place]       HPI:  76 year old female with a pmh of asthma came to the ED with SOB, lightheadedness, chills and an irregular heartbeat. The patient was in the ED 4 days ago with SOB and was prescribed steroids, a zpack and an inhaler. The patient states her symptoms were improving, until she took her flovent inhaler today and began feeling the symptoms she presented with. The patient also complains of a lack of appetite She denies fevers, chest pain, N/V/D/C, abdominal pain. (28 Mar 2017 08:16)        SUBJECTIVE: Patient feeling much better . SOB and wheezing and cough has improved significantly . No chest pain .      ALLERGIES:  Allergies    No Known Allergies    Intolerances          MEDICATIONS  (STANDING):  enoxaparin Injectable 50milliGRAM(s) SubCutaneous every 12 hours  pantoprazole    Tablet 40milliGRAM(s) Oral before breakfast  aspirin enteric coated 325milliGRAM(s) Oral daily  metoprolol 12.5milliGRAM(s) Oral three times a day  atorvastatin 20milliGRAM(s) Oral at bedtime  ALBUTerol/ipratropium for Nebulization 3milliLiter(s) Nebulizer four times a day  buDESOnide   0.5 milliGRAM(s) Respule 0.5milliGRAM(s) Inhalation two times a day  predniSONE   Tablet 20milliGRAM(s) Oral daily    MEDICATIONS  (PRN):      REVIEW OF SYSTEMS:  CONSTITUTIONAL: No fever,  RESPIRATORY: Very mild  cough now and  wheezing, shortness of breath and has improved now   CARDIOVASCULAR: No chest pain, dyspnea with moderate extortion  palpitations, dizziness, syncope, paroxysmal nocturnal dyspnea,  or arm or leg swelling  GASTROINTESTINAL: No abdominal  or epigastric pain, nausea, vomiting,  diarrhea  NEUROLOGICAL: No headaches,  loss of strength, numbness, or tremors    Vital Signs Last 24 Hrs  T(C): 36.7, Max: 37.1 (03-29 @ 04:55)  T(F): 98.1, Max: 98.7 (03-29 @ 04:55)  HR: 68 (68 - 83)  BP: 125/75 (102/67 - 157/79)  BP(mean): --  RR: 18 (17 - 19)  SpO2: 99% (97% - 99%)    PHYSICAL EXAM:  HEAD:  Atraumatic, Normocephalic  NECK: Supple and normal thyroid.  No JVD or carotid bruit.   HEART: S1, S2 regular , 1/6 soft ejection systolic murmur in mitral area , no thrill and no gallops .  PULMONARY: Bilateral vesicular breathing , few scattered ronchil and few scattered rales are present .  ABDOMEN: Soft nontender and positive bowl sounds   EXTREMITIES:  No clubbing, cyanosis, or pedal  edema  NEUROLOGICAL: AAOX3 , no focal deficit .    LABS:                        14.4   5.5   )-----------( 232      ( 29 Mar 2017 06:48 )             42.7     29 Mar 2017 06:48    140    |  107    |  21     ----------------------------<  131    4.3     |  25     |  0.70     Ca    8.6        29 Mar 2017 06:48  Phos  2.9       29 Mar 2017 06:48  Mg     2.2       29 Mar 2017 06:48    TPro  6.8    /  Alb  3.6    /  TBili  0.5    /  DBili  x      /  AST  8      /  ALT  20     /  AlkPhos  87     29 Mar 2017 06:48    CARDIAC MARKERS ( 29 Mar 2017 06:48 )  .027 ng/mL / x     / x     / x     / x      CARDIAC MARKERS ( 28 Mar 2017 04:35 )  .076 ng/mL / x     / 38 U/L / x     / x      CARDIAC MARKERS ( 28 Mar 2017 01:30 )  .086 ng/mL / x     / 41 U/L / x     / 1.9 ng/mL      PT/INR - ( 29 Mar 2017 06:48 )   PT: 11.9 sec;   INR: 1.09 ratio             BNP      EKG:Diagnosis Line Normal sinus rhythm  Left axis deviation  Low voltage QRS  Inferior infarct (cited on or before 27-MAR-2017)  Cannot rule out Anterior infarct (cited on or before 27-MAR-2017)  Abnormal ECG  When compared with ECG of 27-MAR-2017 18:39, (Unconfirmed)  Questionable changein initial forces of Anterior leads  Confirmed by LAMONT HERNANDEZ ([id number]) on 3/28/2017 9:20:16 PM  ECHO:  IMAGING:  IMPRESSION:  No evidence of DVT.

## 2017-03-30 NOTE — DISCHARGE NOTE ADULT - MEDICATION SUMMARY - MEDICATIONS TO STOP TAKING
I will STOP taking the medications listed below when I get home from the hospital:    Flovent HFA 44 mcg/inh inhalation aerosol  -- 2 puff(s) inhaled 2 times a day  -- Check with your doctor before becoming pregnant.  For inhalation only.  Rinse mouth thoroughly after use.  Shake well before use.

## 2017-03-30 NOTE — DISCHARGE NOTE ADULT - CARE PLAN
Principal Discharge DX:	Shortness of breath  Goal:	fu with cards for stress tst  Instructions for follow-up, activity and diet:	low salt diet

## 2017-03-30 NOTE — DISCHARGE NOTE ADULT - CARE PROVIDER_API CALL
Bright Alejo), Cardiology Cardiology  [address] [place] [address]  Phone: [phone]  Fax: [phone]

## 2017-03-30 NOTE — DISCHARGE NOTE ADULT - HOSPITAL COURSE
76 year old female with a pmh of asthma came to the ED with SOB, lightheadedness, chills and an irregular heartbeat. The patient was in the ED 4 days ago with SOB and was prescribed steroids, a zpack and an inhaler. The patient states her symptoms were improving, until she took her flovent inhaler today and began feeling the symptoms she presented with. The patient also complains of a lack of appetite She denies fevers, chest pain, N/V/D/C, abdominal pain.    fu with cards for stress test, elda sunshine sec to NSTEMI.

## 2017-03-30 NOTE — PROGRESS NOTE ADULT - SUBJECTIVE AND OBJECTIVE BOX
Patient seen and examined. Chart reviewed. Detailed note(s) to follow.         Patient: ANIRUDH RICHARDSON  Female  [id number]    Pulmonary/Critical Care Note Dr Claudio Shafer (Ph )    REVIEW OF SYSTEMS:  Eyes: No eye pain, visual disturbances, or discharge  ENT:  No difficulty hearing, tinnitus, vertigo; No sinus or throat pain  Neck: No pain or stiffness  Breasts: No pain, masses or nipple discharge  Genitourinary: Unremarkable for dysuria, frequency, hematuria or incontinence  Rectal: Unremarkable for pain, hemorrhoids or incontinence  Neurological: No new deficits  Unremarkable for headaches, memory loss, loss of strength, numbness or tremors  Skin: Unremarkable for itching, burning, rashes or lesions   Lymph Nodes: No enlarged glands  Endocrine: Unremarkable for heat or cold intolerance; No hair loss  Musculoskeletal: Unremarkable for joint pain or swelling; No muscle, back or extremity pain  Psychiatric: Unremarkable for depression, anxiety, mood swings or difficulty sleeping  Heme/Lymph: Unremarkable for easy bruising or bleeding gums  Allergy and Immunologic: No hives or eczema  Respiratory: No cough, wheezing, chills or hemoptysis Does have dyspnea worse on exertion  Cardiovascular: No chest pain, palpitations,  dizziness   Constitutional: No fever, weight loss or fatigue  Gastrointestinal: No diarrhea or constipation. No melena or hematochezia.    PHYSICAL EXAM:    Constitutional: NAD,   HEENT: PERRLA, EOMI, Normal Hearing, MMM  Neck: No LAD, No JVD  Back: Normal spine flexure, No CVA tenderness   Cardiovascular: S1 and S2, RRR, no M/G/R  Gastrointestinal: BS+, soft, NT/ND  Vascular: 2+ peripheral pulses  Skin: No rashes  Respiratory: Harsh breath sounds Expiration prolonged   Neurological: Sl confused   Extremities: No peripheral edema    T(C): 36.7, Max: 36.9 (03-29 @ 20:56)  HR: 67 (67 - 83)  BP: 134/70 (102/67 - 157/79)  RR: 18 (18 - 19)  SpO2: 96% (96% - 99%)  Wt(kg): -- Patient seen and examined. Chart reviewed. Detailed note(s) to follow.         Patient: ANIRUDH RICHARDSON  Female  [id number]    Pulmonary/Critical Care Note Dr Claudio Shafer (Ph )    REVIEW OF SYSTEMS:  Eyes: No eye pain, visual disturbances, or discharge  ENT:  No difficulty hearing, tinnitus, vertigo; No sinus or throat pain  Neck: No pain or stiffness  Breasts: No pain, masses or nipple discharge  Genitourinary: Unremarkable for dysuria, frequency, hematuria or incontinence  Rectal: Unremarkable for pain, hemorrhoids or incontinence  Neurological: No new deficits  Unremarkable for headaches, memory loss, loss of strength, numbness or tremors  Skin: Unremarkable for itching, burning, rashes or lesions   Lymph Nodes: No enlarged glands  Endocrine: Unremarkable for heat or cold intolerance; No hair loss  Musculoskeletal: Unremarkable for joint pain or swelling; No muscle, back or extremity pain  Psychiatric: Unremarkable for depression, anxiety, mood swings or difficulty sleeping  Heme/Lymph: Unremarkable for easy bruising or bleeding gums  Allergy and Immunologic: No hives or eczema  Respiratory: No cough, wheezing, chills or hemoptysis Does have dyspnea worse on exertion  Cardiovascular: No chest pain, palpitations,  dizziness   Constitutional: No fever, weight loss or fatigue  Gastrointestinal: No diarrhea or constipation. No melena or hematochezia.    PHYSICAL EXAM:    Constitutional: NAD,   HEENT: PERRLA, EOMI, Normal Hearing, MMM  Neck: No LAD, No JVD  Back: Normal spine flexure, No CVA tenderness   Cardiovascular: S1 and S2, RRR, no M/G/R  Gastrointestinal: BS+, soft, NT/ND  Vascular: 2+ peripheral pulses  Skin: No rashes  Respiratory: Harsh breath sounds Expiration prolonged   Neurological: Sl confused   Extremities: No peripheral edema    T(C): 36.7, Max: 36.9 (03-29 @ 20:56)  HR: 67 (67 - 83)  BP: 134/70 (102/67 - 157/79)  RR: 18 (18 - 19)  SpO2: 96% (96% - 99%)  Wt(kg): --    3/30 Had long discussion with patient and her son bedside answered questions They will comse see me in office in a couple of weeks for followup Patient seen and examined. Chart reviewed. Detailed note(s) to follow.         Patient: ANIRUDH RICHARDSON  Female  [id number]    Pulmonary/Critical Care Note Dr Claudio Shafer (Ph )    REVIEW OF SYSTEMS:  Eyes: No eye pain, visual disturbances, or discharge  ENT:  No difficulty hearing, tinnitus, vertigo; No sinus or throat pain  Neck: No pain or stiffness  Breasts: No pain, masses or nipple discharge  Genitourinary: Unremarkable for dysuria, frequency, hematuria or incontinence  Rectal: Unremarkable for pain, hemorrhoids or incontinence  Neurological: No new deficits  Unremarkable for headaches, memory loss, loss of strength, numbness or tremors  Skin: Unremarkable for itching, burning, rashes or lesions   Lymph Nodes: No enlarged glands  Endocrine: Unremarkable for heat or cold intolerance; No hair loss  Musculoskeletal: Unremarkable for joint pain or swelling; No muscle, back or extremity pain  Psychiatric: Unremarkable for depression, anxiety, mood swings or difficulty sleeping  Heme/Lymph: Unremarkable for easy bruising or bleeding gums  Allergy and Immunologic: No hives or eczema  Respiratory: No cough, wheezing, chills or hemoptysis Does have dyspnea worse on exertion  Cardiovascular: No chest pain, palpitations,  dizziness   Constitutional: No fever, weight loss or fatigue  Gastrointestinal: No diarrhea or constipation. No melena or hematochezia.    PHYSICAL EXAM:    Constitutional: NAD,   HEENT: PERRLA, EOMI, Normal Hearing, MMM  Neck: No LAD, No JVD  Back: Normal spine flexure, No CVA tenderness   Cardiovascular: S1 and S2, RRR, no M/G/R  Gastrointestinal: BS+, soft, NT/ND  Vascular: 2+ peripheral pulses  Skin: No rashes  Respiratory: Harsh breath sounds Expiration prolonged   Neurological: Sl confused   Extremities: No peripheral edema    T(C): 36.7, Max: 36.9 (03-29 @ 20:56)  HR: 67 (67 - 83)  BP: 134/70 (102/67 - 157/79)  RR: 18 (18 - 19)  SpO2: 96% (96% - 99%)  Wt(kg): --    3/30 Had long discussion with patient and her son bedside answered questions They will comse see me in office in a couple of weeks for followup     CONTACT Gloria Bennett [phone] self [phone]    REASON FOR VISIT Subsequent FU Pulmonary/Critical Care  Initial evaluation/Pulmonary consultation requested  3/28/2017 from Dr Shafer by Dr Cornel Alejo   Patient examined chart reviewed  HOSPITAL ADMISSION [facility] Dr Cornel Alejo 3/28/2017    VITALS/LABS               3/30/2017 afeb 67 130/70 18 96%  PATIENT DESCRIPTION  76 f  former [employer] employee last flu immunization 3 y pta 1 ppd smoker quit 1/2017 lives with pet cat [facility] asthma uses prn inhaler was admitted  [facility] 3/28/2017 with progressive SOB x 4-5 d She had been evaluated and Dced on zpak and steroids 3/23  ASSESSMENT/PLAN   RESP PO acceptable 3/30 RA 96%  RO PNEUMONIA 3/28-3/29 W 7.8-5.5 3/23 CXR NAPD 3/28  CXR No infiltr 3/28 pct n 3/28 RVP n  Bacterial pneumonia unlikely   COPD On prov hfa 2p.4 (3/28)  Pred 20 (3/29) Duoneb.4 (3/28) Pulmicort (3/28) Improved   POSSIBLE MI On  (3/28) Lvnx 50.2 (3/28) atorvastat 20 (3/28) Metoprolol 12.5x2 (3/30) (Dr Nash)  3/27-3/28 CK 49-41-38 3/27-3/28-3/29 Tr 1 .066 (i) -.086 (i) -.076 (i)-.027   SUP On Protonix 40 (3/28)   DVT P On Lvnx 50.2 (3/28) 3/28 V duplex n   DIET On CHINO (3/28)

## 2017-03-30 NOTE — DISCHARGE NOTE ADULT - MEDICATION SUMMARY - MEDICATIONS TO TAKE
I will START or STAY ON the medications listed below when I get home from the hospital:    aspirin 325 mg oral delayed release tablet  -- 1 tab(s) by mouth once a day  -- Indication: For cad    atorvastatin 20 mg oral tablet  -- 1 tab(s) by mouth once a day (at bedtime)  -- Indication: For hld    metoprolol tartrate 25 mg oral tablet  -- 0.5 tab(s) by mouth 2 times a day  -- It is very important that you take or use this exactly as directed.  Do not skip doses or discontinue unless directed by your doctor.  May cause drowsiness.  Alcohol may intensify this effect.  Use care when operating dangerous machinery.  Some non-prescription drugs may aggravate your condition.  Read all labels carefully.  If a warning appears, check with your doctor before taking.  Take with food or milk.  This drug may impair the ability to drive or operate machinery.  Use care until you become familiar with its effects.    -- Indication: For cad    Proventil HFA 90 mcg/inh inhalation aerosol  -- 1 puff(s) inhaled every 4 hours- for shortness of breath and/or wheezing  -- For inhalation only.  It is very important that you take or use this exactly as directed.  Do not skip doses or discontinue unless directed by your doctor.  Obtain medical advice before taking any non-prescription drugs as some may affect the action of this medication.  Shake well before use.      -- Indication: For asthma    pantoprazole 40 mg oral delayed release tablet  -- 1 tab(s) by mouth once a day (before a meal)  -- Indication: For gerd    Flovent HFA 44 mcg/inh inhalation aerosol  -- 2 puff(s) inhaled 2 times a day  -- Check with your doctor before becoming pregnant.  For inhalation only.  Rinse mouth thoroughly after use.  Shake well before use.      -- Indication: For asthma

## 2017-03-30 NOTE — PROGRESS NOTE ADULT - ASSESSMENT
76 f  former [employer] employee last flu immunization 3 y pta 1 ppd smoker quit 1/2017 lives with pet cat PMH asthma uses prn inhaler was admitted  [facility] P 3/28/2017 with progressive SOB x 4-5 d She had been evaluated and Dced on zpak and steroids 3/23  RESP PO acceptable 3/29 RA 98% 3/28 RA 97%   RO PNEUMONIA 3/28-3/29 W 7.8-5.5 3/23 CXR NAPD 3/28  CXR No infiltr 3/28 pct n 3/28 RVP n  Bacterial pneumonia unlikely   COPD On prov hfa 2p.4 (3/28)  Pred 20 (3/29) Duoneb.4 (3/28) Pulmicort (3/28)   POSSIBLE MI On  (3/28) Lvnx 50.2 (3/28) atorvastat 20 (3/28) Metoprolol 12.5x3 (3/28) 3/27-3/28 CK 49-41-38 3/27-3/28-3/29 Tr 1 .066 (i) -.086 (i) -.076 (i)-.027   SUP On Protonix 40 (3/28)   DVT P On Lvnx 50.2 (3/28) 3/28 V duplex n   DIET On CHINO (3/28)
76 year old female with a pmh of asthma came to the ED with SOB, lightheadedness, chills and an irregular heartbeat. The patient was in the ED 4 days ago with SOB and was prescribed steroids, a zpack and an inhaler. The patient states her symptoms were improving, until she took her flovent inhaler today and began feeling the symptoms she presented with. The patient also complains of a lack of appetite She denies fevers, chest pain, N/V/D/C, abdominal pain.
Assessment and plan :   1) Exacerbation of bronchial asthma and better .  2) Mild elevated Troponin I do not meet criteria of NON-ST KEYA .  3) Mild hypertension and BP stable .  Plan : Cardiac stable so far . 2) Will do dobutamine sestamibi as outpatient to R/O CAD and for risk stratification 3) If remain stable , patient can go home today and follow with me 1 week as out patient . 4 ) Continue present medications .

## 2017-04-02 LAB
CULTURE RESULTS: SIGNIFICANT CHANGE UP
SPECIMEN SOURCE: SIGNIFICANT CHANGE UP

## 2017-04-03 DIAGNOSIS — R63.0 ANOREXIA: ICD-10-CM

## 2017-04-03 DIAGNOSIS — I25.2 OLD MYOCARDIAL INFARCTION: ICD-10-CM

## 2017-04-03 DIAGNOSIS — J45.901 UNSPECIFIED ASTHMA WITH (ACUTE) EXACERBATION: ICD-10-CM

## 2017-04-03 DIAGNOSIS — Z87.891 PERSONAL HISTORY OF NICOTINE DEPENDENCE: ICD-10-CM

## 2017-04-03 DIAGNOSIS — R01.1 CARDIAC MURMUR, UNSPECIFIED: ICD-10-CM

## 2017-04-03 DIAGNOSIS — Z91.81 HISTORY OF FALLING: ICD-10-CM

## 2017-04-03 DIAGNOSIS — J44.9 CHRONIC OBSTRUCTIVE PULMONARY DISEASE, UNSPECIFIED: ICD-10-CM

## 2018-08-17 NOTE — ASU PATIENT PROFILE, ADULT - PMH
Uncomplicated asthma, unspecified asthma severity  past history - currently stable with no medications

## 2018-08-20 ENCOUNTER — OUTPATIENT (OUTPATIENT)
Dept: OUTPATIENT SERVICES | Facility: HOSPITAL | Age: 78
LOS: 1 days | End: 2018-08-20
Payer: MEDICARE

## 2018-08-20 VITALS
HEART RATE: 77 BPM | TEMPERATURE: 98 F | WEIGHT: 136.25 LBS | HEIGHT: 61 IN | SYSTOLIC BLOOD PRESSURE: 127 MMHG | DIASTOLIC BLOOD PRESSURE: 75 MMHG | RESPIRATION RATE: 20 BRPM | OXYGEN SATURATION: 98 %

## 2018-08-20 VITALS
HEART RATE: 83 BPM | DIASTOLIC BLOOD PRESSURE: 71 MMHG | RESPIRATION RATE: 17 BRPM | SYSTOLIC BLOOD PRESSURE: 118 MMHG | OXYGEN SATURATION: 99 %

## 2018-08-20 DIAGNOSIS — H25.12 AGE-RELATED NUCLEAR CATARACT, LEFT EYE: ICD-10-CM

## 2018-08-20 PROCEDURE — 66984 XCAPSL CTRC RMVL W/O ECP: CPT | Mod: LT

## 2018-08-20 PROCEDURE — V2632: CPT

## 2018-08-20 NOTE — ASU DISCHARGE PLAN (ADULT/PEDIATRIC). - NOTIFY
Pain not relieved by Medications/Inability to Tolerate Liquids or Foods/Persistent Nausea and Vomiting/Bleeding that does not stop/Fever greater than 101 Pain not relieved by Medications/Swelling that continues/Fever greater than 101/Bleeding that does not stop/Persistent Nausea and Vomiting/Inability to Tolerate Liquids or Foods

## 2018-08-20 NOTE — ASU DISCHARGE PLAN (ADULT/PEDIATRIC). - PROCEDURE
Phacoemulsification cataract extraction left eye with IOL implant Left eye phacoemulsification cataract extraction with Intraocular Lens implant

## 2018-08-20 NOTE — ASU DISCHARGE PLAN (ADULT/PEDIATRIC). - PT EDUC
intraocular lens implant/Implant card (specify) Implant card (specify)/Intraocular lens implant (IOL) , Eye shield with instructions , sunglasses and eye kit given to patient.

## 2018-10-02 PROBLEM — J45.909 UNSPECIFIED ASTHMA, UNCOMPLICATED: Chronic | Status: ACTIVE | Noted: 2017-03-27

## 2019-05-05 NOTE — ED ADULT NURSE NOTE - ISOLATION TYPE:
Patient Seen in: [facility] Emergency Department    History   Patient presents with:  FB in Throat (GI, respiratory)    Stated Complaint: food bolus     HPI    Patient's 24-year-old gentleman coming here today with impacted food bolus.   He cooks derrell normal. No respiratory distress. Abdominal: Soft. He exhibits no distension. There is no tenderness. Musculoskeletal: Normal range of motion. He exhibits no tenderness. Neurological: He is alert and oriented to person, place, and time.  He exhibits no None

## 2019-10-22 NOTE — PROGRESS NOTE ADULT - PROBLEM/PLAN-1
DISPLAY PLAN FREE TEXT
DISPLAY PLAN FREE TEXT
Adequate: hears normal conversation without difficulty

## 2019-12-13 NOTE — ASU PATIENT PROFILE, ADULT - MEDICATIONS TO TAKE
LEXISCAN     STRESS   TEST      5/21/2018   MD Ghulam Khan    [id number]    1938      PRIMARY  PHYSICIAN      Dalton Ewing MD       DX   CHEST   PAIN   DYSPNEA   EDEMA   HTN    EKG  SR    58           1  EKG    NO    ISCHEMIA      2  NO   ANGINA    3  SR        4  SCAN  PENDING        THANK  YOU    oM Mosqueda MD    5/21/2018       Routine AM meds with sip of H2O only

## 2019-12-16 ENCOUNTER — OUTPATIENT (OUTPATIENT)
Dept: OUTPATIENT SERVICES | Facility: HOSPITAL | Age: 79
LOS: 1 days | End: 2019-12-16
Payer: MEDICARE

## 2019-12-16 VITALS
WEIGHT: 128.31 LBS | SYSTOLIC BLOOD PRESSURE: 193 MMHG | OXYGEN SATURATION: 99 % | HEIGHT: 60 IN | RESPIRATION RATE: 22 BRPM | HEART RATE: 68 BPM | DIASTOLIC BLOOD PRESSURE: 78 MMHG | TEMPERATURE: 98 F

## 2019-12-16 VITALS
RESPIRATION RATE: 19 BRPM | OXYGEN SATURATION: 100 % | DIASTOLIC BLOOD PRESSURE: 78 MMHG | HEART RATE: 76 BPM | SYSTOLIC BLOOD PRESSURE: 166 MMHG

## 2019-12-16 DIAGNOSIS — Z90.711 ACQUIRED ABSENCE OF UTERUS WITH REMAINING CERVICAL STUMP: Chronic | ICD-10-CM

## 2019-12-16 DIAGNOSIS — H25.11 AGE-RELATED NUCLEAR CATARACT, RIGHT EYE: ICD-10-CM

## 2019-12-16 DIAGNOSIS — Z98.49 CATARACT EXTRACTION STATUS, UNSPECIFIED EYE: Chronic | ICD-10-CM

## 2019-12-16 PROCEDURE — V2632: CPT

## 2019-12-16 PROCEDURE — 66984 XCAPSL CTRC RMVL W/O ECP: CPT | Mod: RT

## 2019-12-16 NOTE — ASU DISCHARGE PLAN (ADULT/PEDIATRIC) - CARE PROVIDER_API CALL
Riki Martinez)  Ophthalmology  [address] [place] [address] [phone]  Phone: [phone]  Fax: [phone]  Follow Up Time:

## 2019-12-16 NOTE — ASU DISCHARGE PLAN (ADULT/PEDIATRIC) - CALL YOUR DOCTOR IF YOU HAVE ANY OF THE FOLLOWING:
Bleeding that does not stop/Swelling that gets worse/Fever greater than (need to indicate Fahrenheit or Celsius)/Pain not relieved by Medications/Nausea and vomiting that does not stop

## 2021-04-28 ENCOUNTER — INPATIENT (INPATIENT)
Facility: HOSPITAL | Age: 81
LOS: 8 days | Discharge: HOME | DRG: 175 | End: 2021-05-07
Attending: INTERNAL MEDICINE | Admitting: INTERNAL MEDICINE
Payer: MEDICARE

## 2021-04-28 VITALS
OXYGEN SATURATION: 97 % | WEIGHT: 110.01 LBS | RESPIRATION RATE: 17 BRPM | DIASTOLIC BLOOD PRESSURE: 71 MMHG | SYSTOLIC BLOOD PRESSURE: 121 MMHG | HEART RATE: 73 BPM | TEMPERATURE: 98 F | HEIGHT: 60 IN

## 2021-04-28 DIAGNOSIS — I10 ESSENTIAL (PRIMARY) HYPERTENSION: ICD-10-CM

## 2021-04-28 DIAGNOSIS — J45.909 UNSPECIFIED ASTHMA, UNCOMPLICATED: ICD-10-CM

## 2021-04-28 DIAGNOSIS — I26.99 OTHER PULMONARY EMBOLISM WITHOUT ACUTE COR PULMONALE: ICD-10-CM

## 2021-04-28 DIAGNOSIS — F03.90 UNSPECIFIED DEMENTIA WITHOUT BEHAVIORAL DISTURBANCE: ICD-10-CM

## 2021-04-28 DIAGNOSIS — Z29.9 ENCOUNTER FOR PROPHYLACTIC MEASURES, UNSPECIFIED: ICD-10-CM

## 2021-04-28 DIAGNOSIS — J30.2 OTHER SEASONAL ALLERGIC RHINITIS: ICD-10-CM

## 2021-04-28 DIAGNOSIS — Z90.711 ACQUIRED ABSENCE OF UTERUS WITH REMAINING CERVICAL STUMP: Chronic | ICD-10-CM

## 2021-04-28 DIAGNOSIS — Z98.49 CATARACT EXTRACTION STATUS, UNSPECIFIED EYE: Chronic | ICD-10-CM

## 2021-04-28 LAB
ALBUMIN SERPL ELPH-MCNC: 3.6 G/DL — SIGNIFICANT CHANGE UP (ref 3.3–5)
ALP SERPL-CCNC: 68 U/L — SIGNIFICANT CHANGE UP (ref 40–120)
ALT FLD-CCNC: 11 U/L — LOW (ref 12–78)
ANION GAP SERPL CALC-SCNC: 2 MMOL/L — LOW (ref 5–17)
AST SERPL-CCNC: 12 U/L — LOW (ref 15–37)
BASOPHILS # BLD AUTO: 0.03 K/UL — SIGNIFICANT CHANGE UP (ref 0–0.2)
BASOPHILS NFR BLD AUTO: 0.8 % — SIGNIFICANT CHANGE UP (ref 0–2)
BILIRUB SERPL-MCNC: 0.9 MG/DL — SIGNIFICANT CHANGE UP (ref 0.2–1.2)
BUN SERPL-MCNC: 14 MG/DL — SIGNIFICANT CHANGE UP (ref 7–23)
CALCIUM SERPL-MCNC: 8.8 MG/DL — SIGNIFICANT CHANGE UP (ref 8.5–10.1)
CHLORIDE SERPL-SCNC: 112 MMOL/L — HIGH (ref 96–108)
CO2 SERPL-SCNC: 29 MMOL/L — SIGNIFICANT CHANGE UP (ref 22–31)
CREAT SERPL-MCNC: 0.65 MG/DL — SIGNIFICANT CHANGE UP (ref 0.5–1.3)
D DIMER BLD IA.RAPID-MCNC: 672 NG/ML DDU — HIGH
EOSINOPHIL # BLD AUTO: 0.34 K/UL — SIGNIFICANT CHANGE UP (ref 0–0.5)
EOSINOPHIL NFR BLD AUTO: 9 % — HIGH (ref 0–6)
GLUCOSE SERPL-MCNC: 82 MG/DL — SIGNIFICANT CHANGE UP (ref 70–99)
HCT VFR BLD CALC: 39.6 % — SIGNIFICANT CHANGE UP (ref 34.5–45)
HGB BLD-MCNC: 13.4 G/DL — SIGNIFICANT CHANGE UP (ref 11.5–15.5)
IMM GRANULOCYTES NFR BLD AUTO: 0.5 % — SIGNIFICANT CHANGE UP (ref 0–1.5)
LYMPHOCYTES # BLD AUTO: 1.39 K/UL — SIGNIFICANT CHANGE UP (ref 1–3.3)
LYMPHOCYTES # BLD AUTO: 36.9 % — SIGNIFICANT CHANGE UP (ref 13–44)
MCHC RBC-ENTMCNC: 31.9 PG — SIGNIFICANT CHANGE UP (ref 27–34)
MCHC RBC-ENTMCNC: 33.8 GM/DL — SIGNIFICANT CHANGE UP (ref 32–36)
MCV RBC AUTO: 94.3 FL — SIGNIFICANT CHANGE UP (ref 80–100)
MONOCYTES # BLD AUTO: 0.43 K/UL — SIGNIFICANT CHANGE UP (ref 0–0.9)
MONOCYTES NFR BLD AUTO: 11.4 % — SIGNIFICANT CHANGE UP (ref 2–14)
NEUTROPHILS # BLD AUTO: 1.56 K/UL — LOW (ref 1.8–7.4)
NEUTROPHILS NFR BLD AUTO: 41.4 % — LOW (ref 43–77)
NRBC # BLD: 0 /100 WBCS — SIGNIFICANT CHANGE UP (ref 0–0)
NT-PROBNP SERPL-SCNC: 87 PG/ML — SIGNIFICANT CHANGE UP (ref 0–450)
PLATELET # BLD AUTO: 218 K/UL — SIGNIFICANT CHANGE UP (ref 150–400)
POTASSIUM SERPL-MCNC: 4.7 MMOL/L — SIGNIFICANT CHANGE UP (ref 3.5–5.3)
POTASSIUM SERPL-SCNC: 4.7 MMOL/L — SIGNIFICANT CHANGE UP (ref 3.5–5.3)
PROT SERPL-MCNC: 6.8 G/DL — SIGNIFICANT CHANGE UP (ref 6–8.3)
RAPID RVP RESULT: SIGNIFICANT CHANGE UP
RBC # BLD: 4.2 M/UL — SIGNIFICANT CHANGE UP (ref 3.8–5.2)
RBC # FLD: 13.6 % — SIGNIFICANT CHANGE UP (ref 10.3–14.5)
SARS-COV-2 RNA SPEC QL NAA+PROBE: SIGNIFICANT CHANGE UP
SARS-COV-2 RNA SPEC QL NAA+PROBE: SIGNIFICANT CHANGE UP
SODIUM SERPL-SCNC: 143 MMOL/L — SIGNIFICANT CHANGE UP (ref 135–145)
TROPONIN I SERPL-MCNC: <.015 NG/ML — SIGNIFICANT CHANGE UP (ref 0.01–0.04)
WBC # BLD: 3.77 K/UL — LOW (ref 3.8–10.5)
WBC # FLD AUTO: 3.77 K/UL — LOW (ref 3.8–10.5)

## 2021-04-28 PROCEDURE — 71045 X-RAY EXAM CHEST 1 VIEW: CPT | Mod: 26

## 2021-04-28 PROCEDURE — 70450 CT HEAD/BRAIN W/O DYE: CPT | Mod: 26

## 2021-04-28 PROCEDURE — 99285 EMERGENCY DEPT VISIT HI MDM: CPT

## 2021-04-28 PROCEDURE — 93010 ELECTROCARDIOGRAM REPORT: CPT

## 2021-04-28 PROCEDURE — 71275 CT ANGIOGRAPHY CHEST: CPT | Mod: 26,MA

## 2021-04-28 RX ORDER — ALBUTEROL 90 UG/1
1 AEROSOL, METERED ORAL EVERY 4 HOURS
Refills: 0 | Status: DISCONTINUED | OUTPATIENT
Start: 2021-04-28 | End: 2021-05-07

## 2021-04-28 RX ORDER — ENOXAPARIN SODIUM 100 MG/ML
50 INJECTION SUBCUTANEOUS EVERY 12 HOURS
Refills: 0 | Status: DISCONTINUED | OUTPATIENT
Start: 2021-04-29 | End: 2021-04-30

## 2021-04-28 RX ORDER — ENOXAPARIN SODIUM 100 MG/ML
50 INJECTION SUBCUTANEOUS ONCE
Refills: 0 | Status: COMPLETED | OUTPATIENT
Start: 2021-04-28 | End: 2021-04-28

## 2021-04-28 RX ORDER — SODIUM CHLORIDE 9 MG/ML
1000 INJECTION INTRAMUSCULAR; INTRAVENOUS; SUBCUTANEOUS ONCE
Refills: 0 | Status: COMPLETED | OUTPATIENT
Start: 2021-04-28 | End: 2021-04-28

## 2021-04-28 RX ORDER — ALBUTEROL 90 UG/1
1 AEROSOL, METERED ORAL ONCE
Refills: 0 | Status: COMPLETED | OUTPATIENT
Start: 2021-04-28 | End: 2021-04-28

## 2021-04-28 RX ORDER — LORATADINE 10 MG/1
10 TABLET ORAL DAILY
Refills: 0 | Status: DISCONTINUED | OUTPATIENT
Start: 2021-04-28 | End: 2021-05-07

## 2021-04-28 RX ORDER — PANTOPRAZOLE SODIUM 20 MG/1
40 TABLET, DELAYED RELEASE ORAL
Refills: 0 | Status: DISCONTINUED | OUTPATIENT
Start: 2021-04-28 | End: 2021-05-07

## 2021-04-28 RX ORDER — LOSARTAN POTASSIUM 100 MG/1
50 TABLET, FILM COATED ORAL DAILY
Refills: 0 | Status: DISCONTINUED | OUTPATIENT
Start: 2021-04-28 | End: 2021-05-07

## 2021-04-28 RX ADMIN — ALBUTEROL 1 PUFF(S): 90 AEROSOL, METERED ORAL at 15:50

## 2021-04-28 RX ADMIN — LOSARTAN POTASSIUM 50 MILLIGRAM(S): 100 TABLET, FILM COATED ORAL at 20:22

## 2021-04-28 RX ADMIN — SODIUM CHLORIDE 1000 MILLILITER(S): 9 INJECTION INTRAMUSCULAR; INTRAVENOUS; SUBCUTANEOUS at 19:06

## 2021-04-28 RX ADMIN — ENOXAPARIN SODIUM 50 MILLIGRAM(S): 100 INJECTION SUBCUTANEOUS at 19:06

## 2021-04-28 RX ADMIN — SODIUM CHLORIDE 1000 MILLILITER(S): 9 INJECTION INTRAMUSCULAR; INTRAVENOUS; SUBCUTANEOUS at 15:50

## 2021-04-28 RX ADMIN — LORATADINE 10 MILLIGRAM(S): 10 TABLET ORAL at 21:05

## 2021-04-28 NOTE — H&P ADULT - ASSESSMENT
79 yo female PMH dementia, HTN, asthma controlled, seasonal allergies presents to ED c/o SOB, onset this AM. Patient discharged from [facility] on Monday 4/16 and diagnosed with Covid at that time. Patient admitted for acute bilat pulmonary emboli found on CTA.     #Pulmonary Emboli  -CTA showing acute bilateral pulmonary emboli, no evidence of R heart strain  -EKG showing NSR, rate 62, t wave abnormality, no signs of R heart strain  -Per daughter, patient +Covid PCR on Monday at [facility], Covid PCR neg in ED, f/u repeat  -S/p Lovenox 50mg in ED  -Start Lovenox 50mg BID  -Monitor on tele    #Dementia  -Per daughter, patient with increased confusion past 1-2 months  -CT head +Small vessel ischemic changes in both hemispheres with volume loss and involutional change. No acute infarct or hemorrhagic lesion noted. Right frontal meningioma measuring approximately 1.1 cm.  -A&Ox1 at baseline on admission  -Likely 2/2 worsening dementia vs. underlying infectious process vs. Covid diagnosis per [facility]  -F/u Covid PCR  -Neuro consulted?    #HTN  -Patient recently prescribed Losartan 50mg upon d/c at [facility]  -Per daughter, patient with history of hypotension  -Continue losartan with hold parameters, monitor routine hemodynamics    #Seasonal allergies  -Start loratadine daily    #Asthma  -Chronic, controlled  -Patient intermittently on Albuterol inhaler at home  -Continue PRN    #DVT PPx  -Lovenox 50mg BID for tx of PE       81 yo female PMH dementia, HTN, asthma controlled, seasonal allergies presents to ED c/o SOB, onset this AM. Patient discharged from [facility] on Monday 4/16 and diagnosed with Covid at that time. Patient admitted for acute bilat pulmonary emboli found on CTA.     #Pulmonary Emboli  -CTA showing acute bilateral pulmonary emboli, no evidence of R heart strain  -EKG showing NSR, rate 62, t wave abnormality, no signs of R heart strain  -Admit to telemetry with continuous pulse ox  -Per daughter, patient +Covid PCR on Monday at [facility], Covid PCR neg in ED 4/28, f/u repeat swab  -S/p Lovenox 50mg in ED, continue Lovenox 50mg BID  -Monitor for signs/symptoms of bleeding      #Dementia  -Per daughter, patient with increased confusion past 1-2 months  -CT head +Small vessel ischemic changes in both hemispheres with volume loss and involutional change. No acute infarct or hemorrhagic lesion noted. Right frontal meningioma measuring approximately 1.1 cm.  -A&Ox1 at baseline on admission  -Likely 2/2 worsening dementia vs. underlying infectious process vs. Covid diagnosis per [facility]  -F/u repeat COVID PCR, RVP, UA  -Fall precautions, aspiration precautions  -PT consulted  -Neuro consulted?    #HTN  -Patient recently prescribed Losartan 50mg upon d/c at [facility] on 4/26  -Per daughter, patient with history of hypotension  -Continue losartan with hold parameters, monitor routine hemodynamics    #Seasonal allergies  -Start loratadine daily    #Asthma  -Chronic, controlled  -Patient intermittently on Albuterol inhaler at home  -Continue PRN    #DVT PPx  -Lovenox 50mg BID for tx of PE  -Will need to confirm with family correct pharmacy     79 yo female PMH dementia, HTN, asthma controlled, seasonal allergies presents to ED c/o SOB, onset this AM. Patient discharged from [facility] on Monday 4/16 and diagnosed with Covid at that time. Patient admitted for acute bilat pulmonary emboli found on CTA.     #Pulmonary Emboli  -CTA showing acute bilateral pulmonary emboli, no evidence of R heart strain  -EKG showing NSR, rate 62, t wave abnormality, no signs of R heart strain  -Admit to telemetry with continuous pulse ox  -Per daughter, patient +Covid PCR on Monday at [facility], Covid PCR neg in ED 4/28, f/u repeat swab  -S/p Lovenox 50mg in ED, continue Lovenox 50mg BID  -Monitor for signs/symptoms of bleeding      #Dementia  -Per daughter, patient with increased confusion past 1-2 months  -CT head +Small vessel ischemic changes in both hemispheres with volume loss and involutional change. No acute infarct or hemorrhagic lesion noted. Right frontal meningioma measuring approximately 1.1 cm.  -A&Ox1 at baseline on admission  -Likely 2/2 worsening dementia vs. underlying infectious process vs. Covid diagnosis per [facility]  -F/u repeat COVID PCR, RVP, UA  -Fall precautions, aspiration precautions  -PT consulted  -Neuro consulted?    #HTN  -Patient recently prescribed Losartan 50mg upon d/c at [facility] on 4/26  -Per daughter, patient with history of hypotension  -Continue losartan with hold parameters, monitor routine hemodynamics    #Seasonal allergies  -Start loratadine daily    #Asthma  -Chronic, controlled  -Patient intermittently on Albuterol inhaler at home  -Continue PRN    #Need for prophylactic measure  -VT PPx: Lovenox 50mg BID for tx of PE  -Will need to confirm with family correct pharmacy     81 yo female PMH dementia, HTN, asthma controlled, seasonal allergies presents to ED c/o SOB, onset this AM. Patient discharged from [facility] on Monday 4/16 and diagnosed with Covid at that time. Patient admitted for acute bilat pulmonary emboli found on CTA.     #Pulmonary Emboli  -CTA showing acute bilateral pulmonary emboli, no evidence of R heart strain  -EKG showing NSR, rate 62, t wave abnormality, no signs of R heart strain  -Admit to telemetry with continuous pulse ox  -Per daughter, patient +Covid PCR on Monday at [facility], Covid PCR neg in ED 4/28, f/u repeat swab  -S/p Lovenox 50mg in ED, continue Lovenox 50mg BID  -Monitor for signs/symptoms of bleeding      #Dementia  -Per daughter, patient with increased confusion past 1-2 months  -CT head +Small vessel ischemic changes in both hemispheres with volume loss and involutional change. No acute infarct or hemorrhagic lesion noted. Right frontal meningioma measuring approximately 1.1 cm.  -A&Ox1 at baseline on admission  -Likely 2/2 worsening dementia vs. underlying infectious process vs. Covid diagnosis per [facility]  -F/u repeat COVID PCR, RVP, UA  -Fall precautions, aspiration precautions  -PT consulted  -Neuro consulted?    #HTN  -Patient recently prescribed Losartan 50mg upon d/c at [facility] on 4/26  -Per daughter, patient with history of hypotension  -Continue losartan with hold parameters, monitor routine hemodynamics    #Seasonal allergies  -Start loratadine daily    #Asthma  -Chronic, controlled  -Patient intermittently on Albuterol inhaler at home  -Continue PRN    #Need for prophylactic measure  -DVT PPx: Lovenox 50mg BID for tx of PE  -Will need to confirm with family correct pharmacy     79 yo female PMH dementia, HTN, asthma controlled, seasonal allergies presents to ED c/o SOB, onset this AM. Patient discharged from [facility] on Monday 4/16 and diagnosed with Covid at that time. Patient admitted for acute bilat pulmonary emboli found on CTA.     #Pulmonary Emboli  -CTA showing acute bilateral pulmonary emboli, no evidence of R heart strain  -EKG showing NSR, rate 62, t wave abnormality, no signs of R heart strain  -Admit to telemetry with continuous pulse ox  -Per daughter, patient +Covid PCR on Monday at [facility], Covid PCR neg in ED 4/28, f/u repeat swab  -S/p Lovenox 50mg in ED, continue Lovenox 50mg BID  -F/u TTE, bilat Dopplers  -Monitor for signs/symptoms of bleeding  -Pulm Dr. Spangler consulted    #Dementia  -Per daughter, patient with increased confusion past 1-2 months  -CT head +Small vessel ischemic changes in both hemispheres with volume loss and involutional change. No acute infarct or hemorrhagic lesion noted. Right frontal meningioma measuring approximately 1.1 cm.  -A&Ox1 at baseline on admission  -Likely 2/2 worsening dementia vs. underlying infectious process vs. Covid diagnosis per [facility]  -F/u repeat COVID PCR, RVP, UA  -F/u thyroid panel, B12, folate  -Fall precautions, aspiration precautions  -PT consulted  -Neuro consulted Dr. Corea  -Palliative LizandroOlena consulted for [facility] discussion    #HTN  -Patient recently prescribed Losartan 50mg upon d/c at [facility] on 4/26  -Per daughter, patient with history of hypotension  -Continue losartan with hold parameters, monitor routine hemodynamics    #Seasonal allergies  -Start loratadine daily    #Asthma  -Chronic, controlled  -Patient intermittently on Albuterol inhaler at home  -Continue PRN    #Need for prophylactic measure  -DVT PPx: Lovenox 50mg BID for tx of PE  -Protonix daily  -Will need to confirm with family correct pharmacy     81 yo female PMH dementia, HTN, asthma controlled, seasonal allergies presents to ED c/o SOB, onset this AM. Patient discharged from [facility] on Monday 4/16 and diagnosed with Covid at that time. Patient admitted for acute bilat pulmonary emboli found on CTA.     #Pulmonary Emboli  -CTA showing acute bilateral pulmonary emboli, no evidence of R heart strain  -EKG showing NSR, rate 62, t wave abnormality, no signs of R heart strain  -Admit to telemetry with continuous pulse ox  -Per daughter, patient +Covid PCR on Monday at [facility], Covid PCR neg in ED 4/28, f/u repeat swab  -Etiology unknown, provoked vs. unprovoked, if RVP negative, patient unlikely COVID+ and PE would likely be provoked in setting of sedentary lifestyle, if RVP + for COVID, likely provoked in setting of COVID  -S/p Lovenox 50mg in ED, continue Lovenox 50mg BID  -F/u TTE, bilat Dopplers  -Monitor for signs/symptoms of bleeding  -Pulm Dr. Spangler consulted  -Heme Dr. Bah consulted    #Dementia  -Per daughter, patient with increased confusion past 1-2 months  -CT head +Small vessel ischemic changes in both hemispheres with volume loss and involutional change. No acute infarct or hemorrhagic lesion noted. Right frontal meningioma measuring approximately 1.1 cm.  -A&Ox1 at baseline on admission  -Likely 2/2 worsening dementia vs. underlying infectious process vs. Covid diagnosis per [facility]  -F/u repeat COVID PCR, RVP, UA  -F/u thyroid panel, B12, folate  -Fall precautions, aspiration precautions  -PT consulted  -Neuro consulted Dr. Corea  -Palliative [facility] consulted for [facility] discussion    #HTN  -Patient recently prescribed Losartan 50mg upon d/c at [facility] on 4/26  -Per daughter, patient with history of hypotension  -Continue losartan with hold parameters, monitor routine hemodynamics    #Seasonal allergies  -Start loratadine daily    #Asthma  -Chronic, controlled  -Patient intermittently on Albuterol inhaler at home  -Continue PRN    #Need for prophylactic measure  -DVT PPx: Lovenox 50mg BID for tx of PE  -Protonix daily  -Will need to confirm with family correct pharmacy     79 yo female PMH dementia, HTN, asthma controlled, seasonal allergies presents to ED c/o SOB, onset this AM. Patient discharged from [facility] on Monday 4/16 and diagnosed with Covid at that time. Patient admitted for acute bilat pulmonary emboli found on CTA.     #Pulmonary Emboli  -CTA showing acute bilateral pulmonary emboli, no evidence of R heart strain  -EKG showing NSR, rate 62, t wave abnormality, no signs of R heart strain  -Admit to telemetry with continuous pulse ox  -Per daughter, patient +Covid PCR on Monday at [facility], Covid PCR neg in ED 4/28, f/u repeat swab  -Etiology unknown, provoked vs. unprovoked, if RVP negative, patient unlikely COVID+ and PE would likely be provoked in setting of sedentary lifestyle, if RVP + for COVID, likely provoked in setting of COVID  -S/p Lovenox 50mg in ED, continue Lovenox 50mg BID  -F/u TTE, bilat Dopplers  -Monitor for signs/symptoms of bleeding  -Pulm Dr. Spangler consulted  -Heme Dr. Bah consulted    #Dementia  -Per daughter, patient with increased confusion past 1-2 months  -CT head +Small vessel ischemic changes in both hemispheres with volume loss and involutional change. No acute infarct or hemorrhagic lesion noted. Right frontal meningioma measuring approximately 1.1 cm.  -A&Ox1 at baseline on admission  -Likely 2/2 worsening dementia vs. underlying infectious process vs. Covid diagnosis per [facility]  -F/u repeat COVID PCR, RVP, UA  -F/u thyroid panel, B12, folate  -Fall precautions, aspiration precautions  -PT consulted  -Neuro consulted Dr. Corea  -Palliative [facility] consulted for [facility] discussion  -SW/CM consult    #HTN  -Patient recently prescribed Losartan 50mg upon d/c at [facility] on 4/26  -Per daughter, patient with history of hypotension  -Continue losartan with hold parameters, monitor routine hemodynamics    #Seasonal allergies  -Start loratadine daily    #Asthma  -Chronic, controlled  -Patient intermittently on Albuterol inhaler at home  -Continue PRN    #Need for prophylactic measure  -DVT PPx: Lovenox 50mg BID for tx of PE  -Protonix daily  -Will need to confirm with family correct pharmacy

## 2021-04-28 NOTE — H&P ADULT - NSICDXPROBLEMPLAN2_GEN_ALL_CORE_FT
-Per daughter, patient with increased confusion past 1-2 months  -CT head +Small vessel ischemic changes in both hemispheres with volume loss and involutional change. No acute infarct or hemorrhagic lesion noted. Right frontal meningioma measuring approximately 1.1 cm.  -A&Ox1 at baseline on admission  -Likely 2/2 worsening dementia vs. underlying infectious process vs. Covid diagnosis per . [facility]  -F/u repeat COVID PCR, RVP, UA  -F/u thyroid panel, B12, folate  -Fall precautions, aspiration precautions  -PT consulted  -Neuro consulted Dr. Corea  -Palliative Lizandro-Olena consulted for [facility] discussion  -SW/CM consult

## 2021-04-28 NOTE — ED PROVIDER NOTE - OBJECTIVE STATEMENT
80 y female BIBA, from a dementia unit at a nursing home facility , as per EMS, staff at facility states patient stated she felt sob.  PMH: htn, copd, asthma  patient vss, appears well, denies sob, chest pain, cough, fever.

## 2021-04-28 NOTE — H&P ADULT - NSICDXPROBLEMPLAN1_GEN_ALL_CORE_FT
-CTA showing acute bilateral pulmonary emboli, no evidence of R heart strain  -EKG showing NSR, rate 62, t wave abnormality, no signs of R heart strain  -Admit to telemetry with continuous pulse ox  -Per daughter, patient +Covid PCR on Monday at [facility], Covid PCR neg in ED 4/28, f/u repeat swab  -Etiology unknown, provoked vs. unprovoked, if RVP negative, patient unlikely COVID+ and PE would likely be provoked in setting of sedentary lifestyle, if RVP + for COVID, likely provoked in setting of COVID  -S/p Lovenox 50mg in ED, continue Lovenox 50mg BID  -F/u TTE, bilat Dopplers  -Monitor for signs/symptoms of bleeding  -Pulm Dr. Spangler consulted  -Heme Dr. Bah consulted

## 2021-04-28 NOTE — ED PROVIDER NOTE - CLINICAL SUMMARY MEDICAL DECISION MAKING FREE TEXT BOX
pmh dementia, BIBA for episode of sob, vss,  pmh asthma, copd, htn, will obtain labs, ekg, cxr, give proventil, reassess

## 2021-04-28 NOTE — H&P ADULT - NSHPSOCIALHISTORY_GEN_ALL_CORE
Tobacco: former smoker  EtOH: denies  Recreational drug use: denies  Lives with: alone at home  Ambulates: independent  ADLs: significant decline past 2 months, now family goes to house everyday to help  Vaccinations: Covid 2/2 completed 4/14

## 2021-04-28 NOTE — H&P ADULT - NSICDXPASTMEDICALHX_GEN_ALL_CORE_FT
PAST MEDICAL HISTORY:  HTN (hypertension)     Uncomplicated asthma, unspecified asthma severity past history - currently stable with no medications

## 2021-04-28 NOTE — H&P ADULT - RS GEN PE MLT RESP DETAILS PC
breath sounds equal/respirations non-labored/no intercostal retractions/no wheezes/diminished breath sounds, L/diminished breath sounds, R breath sounds equal/respirations non-labored/no intercostal retractions/no rales/no rhonchi/no wheezes/diminished breath sounds, L/diminished breath sounds, R

## 2021-04-28 NOTE — ED PROVIDER NOTE - ATTENDING CONTRIBUTION TO CARE
pt from dementia unit with hx of asthma and COPD with c/o episode of SOB while at home.  Pt is a poor historian.    PE: well appearing no distress  diffuse mild exp wheeze  heart wnl    nebs, steroids, xray

## 2021-04-28 NOTE — ED ADULT NURSE NOTE - BREATH SOUNDS, RIGHT
The patient is a 34y Male complaining of The patient is a 34y Male complaining of rectal pain, fever clear

## 2021-04-28 NOTE — ED PROVIDER NOTE - PSYCHIATRIC, MLM
Alert and oriented to person, situation. normal mood and affect. no apparent risk to self or others.

## 2021-04-28 NOTE — H&P ADULT - NSICDXPROBLEMPLAN6_GEN_ALL_CORE_FT
-DVT PPx: Lovenox 50mg BID for tx of PE  -Protonix daily  -Will need to confirm with family correct pharmacy

## 2021-04-28 NOTE — ED ADULT NURSE NOTE - OBJECTIVE STATEMENT
Pt. received alert and oriented x2 with chief complaint as per EMS of 1 episode of SOB. Neighbor called EMS stating pt. was SOB when at home today. Pt. denies any symptoms at current time.

## 2021-04-28 NOTE — H&P ADULT - HISTORY OF PRESENT ILLNESS
81 yo female PMH dementia, HTN, seasonal allergies presents to ED with c/o SOB. Onset this AM. Hx obtained from daughter Yanelis Fraire as patient has hx of dementia with significant mental decline for past 1-2 months. Daughter states on Monday AM patient went to [facility] stating she was SOB. Neighbor called the ambulance and patient was transferred to [facility] and diagnosed with Covid. Patient was d/c the same day with albuterol inhaler and Losartan as pt was hypertensive. This AM, patient had similar episode where she went to neighbor's house and c/o SOB so neighbor called ambulance and patient was transferred to [place].  79 yo female PMH dementia, HTN, asthma controlled, seasonal allergies presents to ED c/o SOB. Onset this AM. Hx obtained from daughter Yanelis Fraire as patient has hx of dementia with significant mental decline for past 1-2 months. Daughter states on Monday AM patient went to neighbor's house stating she was SOB. Neighbor called the ambulance and patient was transferred to [facility] and diagnosed with Covid, daughter does not know how patient got covid as patient is largely home bound with no visitors. Patient was d/c the same day (4/26) with albuterol inhaler and Losartan as pt was hypertensive. This AM, patient had similar episode where she went to [facility] and c/o SOB, so neighbor called ambulance and patient was transferred to [place]. Patient seen and examined at bedside, is a poor historian, A&Ox1 (person). Patient states she was SOB this AM but does not know why she is here. C/o chest tightness but denies cough, wheezing, CP, palpitations, fevers, chills, nausea, abd pain. When asked if patient was in hospital on Monday, she replied "I suppose so." Per daughter, patient with significant mental decline since March. Patient received the Covid vaccine 3/26 and since then has had a progressive decline in mental status. Prior to March patient was A&Ox3 and independent, however since then patient has had increased confusion, found wandering to neighbor's houses. Patient does not have a PCP, has seen Dr. Clements once for pre-op clearance for cataract surgery 1 year ago. Daughter states they have been trying to make an appointment with a neurologist for her cognitive decline but have been unable to do so. Of note, patient has a history of hypotension and has never been on anti-hypertensive medications until Monday. Daughter states she was very surprised that her mother was found to be hypertensive and prescribed Losartan. Patient was given one dose yesterday.     In ED:  Vitals: T 98, HR 63, /76, RR 18, SpO2 100% on RA  Labs significant for: WBC 3.77, D-dimer 672, Cl 112, anion gap 2, AST 12, ALT 11  CTA: + acute bilateral pulmonary emboli, no evidence of R heart strain  CT Head: Small vessel ischemic changes in both hemispheres with volume loss and involutional change. No acute infarct or hemorrhagic lesion noted. Right frontal meningioma measuring approximately 1.1 cm.  EKG: NSR, rate 62, T wave abnormality   Given: 1L NS bolus x1, Albuterol inhaler x1, Lovenox 50mg x1       79 yo female PMH dementia, HTN, asthma controlled, seasonal allergies presents to ED c/o SOB. Onset this AM. Hx obtained from daughter Yanelis Fraire as patient has hx of dementia with significant mental decline for past 1-2 months. Daughter states on Monday AM patient went to neighbor's house stating she was SOB. Neighbor called the ambulance and patient was transferred to [facility] and diagnosed with Covid, daughter does not know how patient got covid as patient is largely home bound with no visitors. Patient was d/c the same day (4/26) with albuterol inhaler and Losartan as pt was hypertensive. This AM, patient had similar episode where she went to [facility] and c/o SOB, so neighbor called ambulance and patient was transferred to [place]. Patient seen and examined at bedside, is a poor historian, A&Ox1 (person). Patient states she was SOB this AM but does not know why she is here. C/o chest tightness but denies cough, wheezing, CP, palpitations, fevers, chills, nausea, abd pain. When asked if patient was in hospital on Monday, she replied "I suppose so." Per daughter, patient with significant mental decline since March. Patient received the Covid vaccine 3/26 and since then has had a progressive decline in mental status. Prior to March patient was A&Ox3 and independent, however since then patient has had increased confusion, found wandering to neighbor's houses. Patient does not have a PCP, has seen Dr. Clements once for pre-op clearance for cataract surgery 1 year ago. Daughter states they have been trying to make an appointment with a neurologist for her cognitive decline but have been unable to do so. Of note, patient has a history of hypotension and has never been on anti-hypertensive medications until Monday. Daughter states she was very surprised that her mother was found to be hypertensive and prescribed Losartan. Patient was given one dose yesterday.     In ED:  Vitals: T 98, HR 63, /76, RR 18, SpO2 100% on RA  Labs significant for: WBC 3.77, D-dimer 672, Cl 112, anion gap 2, AST 12, ALT 11  CTA: + acute bilateral pulmonary emboli, no evidence of R heart strain  CT Head: Small vessel ischemic changes in both hemispheres with volume loss and involutional change. No acute infarct or hemorrhagic lesion noted. Right frontal meningioma measuring approximately 1.1 cm.  EKG: NSR, rate 62, T wave abnormality   Given: 1L NS bolus x1, Albuterol inhaler x1, Lovenox 50mg x1  COVID 19-PCR -NEG       81 yo female PMH dementia, HTN, asthma controlled, seasonal allergies presents to ED c/o SOB. Onset this AM. Hx obtained from daughter Yanelis Fraire as patient has hx of dementia with significant mental decline for past 1-2 months. Daughter states on Monday AM patient went to neighbor's house stating she was SOB. Neighbor called the ambulance and patient was transferred to [facility] and diagnosed with Covid,+ Rxed  with Regen, was observation  daughter does not know how patient got covid as patient is largely home bound with no visitors. Patient was d/c the same day (4/26) with albuterol inhaler and Losartan as pt was hypertensive. This AM, patient had similar episode where she went to [facility] and c/o SOB, so neighbor called ambulance and patient was transferred to [place]. Patient seen and examined at bedside, is a poor historian, A&Ox1 (person). Patient states she was SOB this AM but does not know why she is here. C/o chest tightness but denies cough, wheezing, CP, palpitations, fevers, chills, nausea, abd pain. When asked if patient was in hospital on Monday, she replied "I suppose so." Per daughter, patient with significant mental decline since March. Patient received the Pfizer  Covid vaccine  3/26   and since then has had a progressive decline in mental status 2nd dose Vaccine was given 4/16 , Prior to March patient was A&Ox3 and independent, however since then patient has had increased confusion, found wandering to [facility]. Patient does not have a PCP, has seen Dr. Clements once for pre-op clearance for cataract surgery 1 year ago. Daughter states they have been trying to make an appointment with a neurologist for her cognitive decline but have been unable to do so. Of note, patient has a history of hypotension and has never been on anti-hypertensive medications until Monday. Daughter states she was very surprised that her mother was found to be hypertensive and prescribed Losartan. Patient was given one dose yesterday.     In ED:  Vitals: T 98, HR 63, /76, RR 18, SpO2 100% on RA  Labs significant for: WBC 3.77, D-dimer 672, Cl 112, anion gap 2, AST 12, ALT 11  CTA: + acute bilateral pulmonary emboli, no evidence of R heart strain  CT Head: Small vessel ischemic changes in both hemispheres with volume loss and involutional change. No acute infarct or hemorrhagic lesion noted. Right frontal meningioma measuring approximately 1.1 cm.  EKG: NSR, rate 62, T wave abnormality   Given: 1L NS bolus x1, Albuterol inhaler x1, Lovenox 50mg x1  COVID 19-PCR -NEG

## 2021-04-28 NOTE — H&P ADULT - NEUROLOGICAL DETAILS
responds to verbal commands/cranial nerves intact/normal strength responds to verbal commands/sensation intact/cranial nerves intact/normal strength/strength decreased

## 2021-04-28 NOTE — H&P ADULT - NSICDXPROBLEMPLAN3_GEN_ALL_CORE_FT
-Patient recently prescribed Losartan 50mg upon d/c at [facility] on 4/26  -Per daughter, patient with history of hypotension  -Continue losartan with hold parameters, monitor routine hemodynamics

## 2021-04-28 NOTE — ED PROVIDER NOTE - PROGRESS NOTE DETAILS
spoke with radiologist, patient has bilateral pe, no heart strain spoke with patients daughter, states patient lives alone, she cannot come to see her related to her own quarantine issues.

## 2021-04-28 NOTE — H&P ADULT - ATTENDING COMMENTS
Pt seen, examined, case & care plan d/w presidents at detail.  AM labs   Pulmonary- Dr Spangler  called.  Hematology - DR Bah group  B/L Lower Ext Doppler, ECHO.Full dose AC  Palliative care eval

## 2021-04-29 LAB
ALBUMIN SERPL ELPH-MCNC: 3.4 G/DL — SIGNIFICANT CHANGE UP (ref 3.3–5)
ALP SERPL-CCNC: 62 U/L — SIGNIFICANT CHANGE UP (ref 40–120)
ALT FLD-CCNC: 10 U/L — LOW (ref 12–78)
ANION GAP SERPL CALC-SCNC: 7 MMOL/L — SIGNIFICANT CHANGE UP (ref 5–17)
APTT BLD: 36.8 SEC — HIGH (ref 27.5–35.5)
AST SERPL-CCNC: 11 U/L — LOW (ref 15–37)
BASOPHILS # BLD AUTO: 0.02 K/UL — SIGNIFICANT CHANGE UP (ref 0–0.2)
BASOPHILS NFR BLD AUTO: 0.5 % — SIGNIFICANT CHANGE UP (ref 0–2)
BILIRUB SERPL-MCNC: 1.2 MG/DL — SIGNIFICANT CHANGE UP (ref 0.2–1.2)
BUN SERPL-MCNC: 12 MG/DL — SIGNIFICANT CHANGE UP (ref 7–23)
CALCIUM SERPL-MCNC: 8.3 MG/DL — LOW (ref 8.5–10.1)
CHLORIDE SERPL-SCNC: 111 MMOL/L — HIGH (ref 96–108)
CO2 SERPL-SCNC: 24 MMOL/L — SIGNIFICANT CHANGE UP (ref 22–31)
COVID-19 SPIKE DOMAIN AB INTERP: POSITIVE
COVID-19 SPIKE DOMAIN ANTIBODY RESULT: >250 U/ML — HIGH
CREAT SERPL-MCNC: 0.47 MG/DL — LOW (ref 0.5–1.3)
EOSINOPHIL # BLD AUTO: 0.35 K/UL — SIGNIFICANT CHANGE UP (ref 0–0.5)
EOSINOPHIL NFR BLD AUTO: 9 % — HIGH (ref 0–6)
FOLATE SERPL-MCNC: 18.4 NG/ML — SIGNIFICANT CHANGE UP
GLUCOSE SERPL-MCNC: 64 MG/DL — LOW (ref 70–99)
HCT VFR BLD CALC: 37.3 % — SIGNIFICANT CHANGE UP (ref 34.5–45)
HGB BLD-MCNC: 12.3 G/DL — SIGNIFICANT CHANGE UP (ref 11.5–15.5)
IMM GRANULOCYTES NFR BLD AUTO: 0.3 % — SIGNIFICANT CHANGE UP (ref 0–1.5)
INR BLD: 1.14 RATIO — SIGNIFICANT CHANGE UP (ref 0.88–1.16)
LYMPHOCYTES # BLD AUTO: 1.45 K/UL — SIGNIFICANT CHANGE UP (ref 1–3.3)
LYMPHOCYTES # BLD AUTO: 37.3 % — SIGNIFICANT CHANGE UP (ref 13–44)
MAGNESIUM SERPL-MCNC: 2.2 MG/DL — SIGNIFICANT CHANGE UP (ref 1.6–2.6)
MCHC RBC-ENTMCNC: 31.6 PG — SIGNIFICANT CHANGE UP (ref 27–34)
MCHC RBC-ENTMCNC: 33 GM/DL — SIGNIFICANT CHANGE UP (ref 32–36)
MCV RBC AUTO: 95.9 FL — SIGNIFICANT CHANGE UP (ref 80–100)
MONOCYTES # BLD AUTO: 0.45 K/UL — SIGNIFICANT CHANGE UP (ref 0–0.9)
MONOCYTES NFR BLD AUTO: 11.6 % — SIGNIFICANT CHANGE UP (ref 2–14)
NEUTROPHILS # BLD AUTO: 1.61 K/UL — LOW (ref 1.8–7.4)
NEUTROPHILS NFR BLD AUTO: 41.3 % — LOW (ref 43–77)
NRBC # BLD: 0 /100 WBCS — SIGNIFICANT CHANGE UP (ref 0–0)
PHOSPHATE SERPL-MCNC: 2.6 MG/DL — SIGNIFICANT CHANGE UP (ref 2.5–4.5)
PLATELET # BLD AUTO: 188 K/UL — SIGNIFICANT CHANGE UP (ref 150–400)
POTASSIUM SERPL-MCNC: 3.4 MMOL/L — LOW (ref 3.5–5.3)
POTASSIUM SERPL-SCNC: 3.4 MMOL/L — LOW (ref 3.5–5.3)
PROT SERPL-MCNC: 6.2 G/DL — SIGNIFICANT CHANGE UP (ref 6–8.3)
PROTHROM AB SERPL-ACNC: 13.3 SEC — SIGNIFICANT CHANGE UP (ref 10.6–13.6)
RBC # BLD: 3.89 M/UL — SIGNIFICANT CHANGE UP (ref 3.8–5.2)
RBC # FLD: 13.2 % — SIGNIFICANT CHANGE UP (ref 10.3–14.5)
SARS-COV-2 IGG+IGM SERPL QL IA: >250 U/ML — HIGH
SARS-COV-2 IGG+IGM SERPL QL IA: POSITIVE
SODIUM SERPL-SCNC: 142 MMOL/L — SIGNIFICANT CHANGE UP (ref 135–145)
T3 SERPL-MCNC: 79 NG/DL — LOW (ref 80–200)
T4 AB SER-ACNC: 5.7 UG/DL — SIGNIFICANT CHANGE UP (ref 4.6–12)
TSH SERPL-MCNC: 1.5 UIU/ML — SIGNIFICANT CHANGE UP (ref 0.36–3.74)
VIT B12 SERPL-MCNC: 427 PG/ML — SIGNIFICANT CHANGE UP (ref 232–1245)
WBC # BLD: 3.89 K/UL — SIGNIFICANT CHANGE UP (ref 3.8–10.5)
WBC # FLD AUTO: 3.89 K/UL — SIGNIFICANT CHANGE UP (ref 3.8–10.5)

## 2021-04-29 PROCEDURE — 99221 1ST HOSP IP/OBS SF/LOW 40: CPT

## 2021-04-29 PROCEDURE — 93306 TTE W/DOPPLER COMPLETE: CPT | Mod: 26

## 2021-04-29 PROCEDURE — 93970 EXTREMITY STUDY: CPT | Mod: 26

## 2021-04-29 RX ORDER — POTASSIUM CHLORIDE 20 MEQ
40 PACKET (EA) ORAL EVERY 4 HOURS
Refills: 0 | Status: COMPLETED | OUTPATIENT
Start: 2021-04-29 | End: 2021-04-29

## 2021-04-29 RX ADMIN — LOSARTAN POTASSIUM 50 MILLIGRAM(S): 100 TABLET, FILM COATED ORAL at 05:49

## 2021-04-29 RX ADMIN — LORATADINE 10 MILLIGRAM(S): 10 TABLET ORAL at 11:23

## 2021-04-29 RX ADMIN — ENOXAPARIN SODIUM 50 MILLIGRAM(S): 100 INJECTION SUBCUTANEOUS at 06:30

## 2021-04-29 RX ADMIN — Medication 40 MILLIEQUIVALENT(S): at 11:23

## 2021-04-29 RX ADMIN — PANTOPRAZOLE SODIUM 40 MILLIGRAM(S): 20 TABLET, DELAYED RELEASE ORAL at 05:51

## 2021-04-29 RX ADMIN — Medication 40 MILLIEQUIVALENT(S): at 16:17

## 2021-04-29 RX ADMIN — ENOXAPARIN SODIUM 50 MILLIGRAM(S): 100 INJECTION SUBCUTANEOUS at 17:02

## 2021-04-29 NOTE — BH CONSULTATION LIAISON ASSESSMENT NOTE - CURRENT MEDICATION
MEDICATIONS  (STANDING):  enoxaparin Injectable 50 milliGRAM(s) SubCutaneous every 12 hours  loratadine 10 milliGRAM(s) Oral daily  losartan 50 milliGRAM(s) Oral daily  pantoprazole    Tablet 40 milliGRAM(s) Oral before breakfast  potassium chloride   Powder 40 milliEquivalent(s) Oral every 4 hours    MEDICATIONS  (PRN):  ALBUTerol    90 MICROgram(s) HFA Inhaler 1 Puff(s) Inhalation every 4 hours PRN Shortness of Breath and/or Wheezing

## 2021-04-29 NOTE — BH CONSULTATION LIAISON ASSESSMENT NOTE - NSBHCHARTREVIEWLAB_PSY_A_CORE FT
12.3   3.89  )-----------( 188      ( 29 Apr 2021 07:25 )             37.3   04-29    142  |  111<H>  |  12  ----------------------------<  64<L>  3.4<L>   |  24  |  0.47<L>    Ca    8.3<L>      29 Apr 2021 07:25  Phos  2.6     04-29  Mg     2.2     04-29    TPro  6.2  /  Alb  3.4  /  TBili  1.2  /  DBili  x   /  AST  11<L>  /  ALT  10<L>  /  AlkPhos  62  04-29

## 2021-04-29 NOTE — CONSULT NOTE ADULT - ASSESSMENT
[facility]  Chart Reviewed-Full Consult to follow for any immediate concerns please fell free to contact us directly at  [phone] and have us paged or text my cell # [phone]  aSrmad Yen MD PhD   81 yo female PMH dementia, HTN, asthma controlled, seasonal allergies Adm w PE    diagnosed with Covid,PCR+ 4/26 symptom onset 4/18  Rxed  with Regen, (4/26)  Pfizer  Covid vaccine  3/26 and  4/16 ,  PCR neg x 2 4/28    Recommendations  pt with acute PE which may be a sequela of breakthrough infection, pt now >10 days after COVID-19 symptom onset and PCR-neg x 2 so should not be an infection risk. Suggest rapid viral clearing due to vaccine and REGN monoclonal cocktail    PE-standard anticoagulation    Thank you for consulting us and involving us in the management of this most interesting and challenging case.  We will follow along in the care of this patient. Please call us at [phone] or text me directly on my cell# at [phone] with any concerns.

## 2021-04-29 NOTE — CONSULT NOTE ADULT - ASSESSMENT
Pulmonary embolism without evidence of DVT.  Unclear if this is provoked related to sedentary life style or related to covid but patient has been vaccinated with +spike abs.  Unclear from records from [place] timing of COVID testing  Patient appears comfortable and hemodynamically stable    Recommendations:  1.  follow CBC  2.  continue lovenox and can bridge to DOAC  3.  will try to obtain better records on covid status  4.  further heme recommendations pending above  discussed with Dr. Wallace     Pulmonary embolism without evidence of DVT.  On review of records from [place] appears to be related to Covid. Patient appears comfortable and hemodynamically stable    Recommendations:  1.  follow CBC  2.  continue lovenox and can bridge to DOAC.  will require 6 months of treatment  3.  will try to obtain better records on covid status  4.  further heme recommendations pending above  discussed with Dr. Wallace

## 2021-04-29 NOTE — BH CONSULTATION LIAISON ASSESSMENT NOTE - HPI (INCLUDE ILLNESS QUALITY, SEVERITY, DURATION, TIMING, CONTEXT, MODIFYING FACTORS, ASSOCIATED SIGNS AND SYMPTOMS)
Patient seen, evaluated and chart reviewed. 79 yo female PMH dementia, HTN, asthma controlled, seasonal allergies presents to ED c/o SOB. Onset this AM. Hx obtained from daughter Yanelis Fraire as patient has hx of dementia with significant mental decline for past 1-2 months. Daughter states on Monday AM patient went to neighbor's house stating she was SOB. Neighbor called the ambulance and patient was transferred to [facility] and diagnosed with Covid, daughter does not know how patient got covid as patient is largely home bound with no visitors. Patient was d/c the same day (4/26) with albuterol inhaler and Losartan as pt was hypertensive. This AM, patient had similar episode where she went to neighbor's [place] and c/o SOB, so neighbor called ambulance and patient was transferred to [place]. Patient seen and examined at bedside, is a poor historian, A&Ox1 (person). Patient states she was SOB this AM but does not know why she is here. C/o chest tightness but denies cough, wheezing, CP, palpitations, fevers, chills, nausea, abd pain. When asked if patient was in hospital on Monday, she replied "I suppose so." Per daughter, patient with significant mental decline since March. Patient received the Covid vaccine 3/26 and since then has had a progressive decline in mental status. Prior to March patient was A&Ox3 and independent, however since then patient has had increased confusion, found wandering to neighbor's houses. Patient does not have a PCP, has seen Dr. Clements once for pre-op clearance for cataract surgery 1 year ago. Daughter states they have been trying to make an appointment with a neurologist for her cognitive decline but have been unable to do so. Of note, patient has a history of hypotension and has never been on anti-hypertensive medications until Monday. Daughter states she was very surprised that her mother was found to be hypertensive and prescribed Losartan. Patient was given one dose yesterday.  Patient admits that she has been having difficulty with urinary incontinence, and difficulty taking care of her household. At this time she denies symptoms of psychosis, amita or depression.   Patient seen, evaluated and chart reviewed. 81 yo female PMH dementia, HTN, asthma controlled, seasonal allergies presents to ED c/o SOB. Onset this AM. Hx obtained from daughter Yanelis Fraire as patient has hx of dementia with significant mental decline for past 1-2 months. Daughter states on Monday AM patient went to neighbor's house stating she was SOB. Neighbor called the ambulance and patient was transferred to [facility] and diagnosed with Covid, daughter does not know how patient got covid as patient is largely home bound with no visitors. Patient was d/c the same day (4/26) with albuterol inhaler and Losartan as pt was hypertensive. This AM, patient had similar episode where she went to neighbor's [place] and c/o SOB, so neighbor called ambulance and patient was transferred to [place]. Patient seen and examined at bedside, is a poor historian, A&Ox1 (person). Patient states she was SOB this AM but does not know why she is here. C/o chest tightness but denies cough, wheezing, CP, palpitations, fevers, chills, nausea, abd pain. When asked if patient was in hospital on Monday, she replied "I suppose so." Per daughter, patient with significant mental decline since March. Patient received the Covid vaccine 3/26 and since then has had a progressive decline in mental status. Prior to March patient was A&Ox3 and independent, however since then patient has had increased confusion, found wandering to neighbor's houses. Patient does not have a PCP, has seen Dr. Clements once for pre-op clearance for cataract surgery 1 year ago. Daughter states they have been trying to make an appointment with a neurologist for her cognitive decline but have been unable to do so. Of note, patient has a history of hypotension and has never been on anti-hypertensive medications until Monday. Daughter states she was very surprised that her mother was found to be hypertensive and prescribed Losartan. Patient was given one dose yesterday.  At this time she denies symptoms of psychosis, amita or depression, but is clearly very confused and is unable to understanding her medical condition.

## 2021-04-29 NOTE — PROGRESS NOTE ADULT - ATTENDING COMMENTS
79 yo female PMH dementia, HTN, asthma controlled, seasonal allergies presents to ED c/o SOB, onset this AM. Patient discharged from [facility] on Monday 4/16 and diagnosed with Covid at that time. Patient admitted for acute bilat pulmonary emboli found on CTA.  pt seen, examined, case & care plan d/w, pt 's Dtr , residents at detail.  D/W Dr Bah, DR Yen at detail.  D/W  at detail about pt's safety at home.  NO Need for isolation as d/w DR Yen.  PO diet  PT Eval.

## 2021-04-29 NOTE — BH CONSULTATION LIAISON ASSESSMENT NOTE - ADDITIONAL DETAILS / COMMENTS
Patient is 1) cognitively intact 2) Understands the nature of the medical condition, risks, benefits, alternatives and side-effects of treatment 3) Wants to make decisions voluntarily.  At this time patient has decision making capacity regarding medical decisions.  Patient is 1) cognitively impaired 2) Does not understand the nature of the medical condition, risks, benefits, alternatives and side-effects of treatment 3) Wants to make decisions voluntarily.  At this time patient has no decision making capacity regarding medical decisions.

## 2021-04-29 NOTE — PROGRESS NOTE ADULT - SUBJECTIVE AND OBJECTIVE BOX
Patient is a 80y old  Female who presents with a chief complaint of     79 yo female PMH dementia, HTN, asthma controlled, seasonal allergies presents to ED c/o SOB. Onset this AM. Hx obtained from daughter Yanelis Fraire as patient has hx of dementia with significant mental decline for past 1-2 months. Daughter states on Monday AM patient went to neighbor's house stating she was SOB. Neighbor called the ambulance and patient was transferred to [facility] and diagnosed with Covid, daughter does not know how patient got covid as patient is largely home bound with no visitors. Patient was d/c the same day (4/26) with albuterol inhaler and Losartan as pt was hypertensive. This AM, patient had similar episode where she went to [facility] and c/o SOB, so neighbor called ambulance and patient was transferred to [place]. Patient seen and examined at bedside, is a poor historian, A&Ox1 (person). Patient states she was SOB this AM but does not know why she is here. C/o chest tightness but denies cough, wheezing, CP, palpitations, fevers, chills, nausea, abd pain. When asked if patient was in hospital on Monday, she replied "I suppose so." Per daughter, patient with significant mental decline since March. Patient received the Covid vaccine 3/26 and since then has had a progressive decline in mental status. Prior to March patient was A&Ox3 and independent, however since then patient has had increased confusion, found wandering to neighbor's houses. Patient does not have a PCP, has seen Dr. Clements once for pre-op clearance for cataract surgery 1 year ago. Daughter states they have been trying to make an appointment with a neurologist for her cognitive decline but have been unable to do so. Of note, patient has a history of hypotension and has never been on anti-hypertensive medications until Monday. Daughter states she was very surprised that her mother was found to be hypertensive and prescribed Losartan. Patient was given one dose yesterday.     In ED:  Vitals: T 98, HR 63, /76, RR 18, SpO2 100% on RA  Labs significant for: WBC 3.77, D-dimer 672, Cl 112, anion gap 2, AST 12, ALT 11  CTA: + acute bilateral pulmonary emboli, no evidence of R heart strain  CT Head: Small vessel ischemic changes in both hemispheres with volume loss and involutional change. No acute infarct or hemorrhagic lesion noted. Right frontal meningioma measuring approximately 1.1 cm.  EKG: NSR, rate 62, T wave abnormality   Given: 1L NS bolus x1, Albuterol inhaler x1, Lovenox 50mg x1  COVID 19-PCR -NEG    INTERVAL HPI:   4/29/21: Pt admitted overnight for acute b/l PE. Pt seen and examined. Pt A&Ox1, unsure of why she is here. Pleasant, on RA and denies SOB. Has no acute complaints this AM. On lovenox 50 mg daily for PE. CXR no acute pulm disease, US duplex no dvt b/l. F/u TTE.       OVERNIGHT EVENTS: none     Home Medications:  losartan 50 mg oral tablet: 1 tab(s) orally once a day (28 Apr 2021 19:49)      MEDICATIONS  (STANDING):  enoxaparin Injectable 50 milliGRAM(s) SubCutaneous every 12 hours  loratadine 10 milliGRAM(s) Oral daily  losartan 50 milliGRAM(s) Oral daily  pantoprazole    Tablet 40 milliGRAM(s) Oral before breakfast  potassium chloride   Powder 40 milliEquivalent(s) Oral every 4 hours    MEDICATIONS  (PRN):  ALBUTerol    90 MICROgram(s) HFA Inhaler 1 Puff(s) Inhalation every 4 hours PRN Shortness of Breath and/or Wheezing      Allergies    No Known Allergies    Intolerances        REVIEW OF SYSTEMS: "I feel great"  CONSTITUTIONAL: No fever, No chills, No fatigue, No myalgia, No Body ache, No Weakness  EYES: No eye pain,  No visual disturbances, No discharge, NO Redness  ENMT:  No ear pain, No nose bleed, No vertigo; No sinus or throat pain, No Congestion  NECK: No pain, No stiffness  RESPIRATORY: No cough, wheezing, No  hemoptysis, No shortness of breath  CARDIOVASCULAR: No chest pain, palpitations  GASTROINTESTINAL: No abdominal or epigastric pain. No nausea, No vomiting; No diarrhea or constipation. [  ] BM  GENITOURINARY: No dysuria, No frequency, No urgency, No hematuria, or incontinence  NEUROLOGICAL: No headaches, No dizziness, No numbness, No tingling, No tremors, No weakness  EXT: No Swelling, No Pain, No Edema  SKIN:  [ x ] No itching, burning, rashes, or lesions   MUSCULOSKELETAL: No joint pain or swelling; No muscle pain, No back pain, No extremity pain  PSYCHIATRIC: No depression, anxiety, mood swings or difficulty sleeping at night  PAIN SCALE: [x  ] None  [  ] Other-  ROS Unable to obtain due to - [  ] Dementia  [  ] Lethargy  [  ] Sedated [  ] non verbal  REST OF REVIEW Of SYSTEM - [ x ] Normal     Vital Signs Last 24 Hrs  T(C): 36.4 (29 Apr 2021 12:46), Max: 36.9 (28 Apr 2021 21:38)  T(F): 97.6 (29 Apr 2021 12:46), Max: 98.4 (28 Apr 2021 21:38)  HR: 75 (29 Apr 2021 12:46) (63 - 79)  BP: 158/81 (29 Apr 2021 12:46) (121/71 - 179/82)  BP(mean): --  RR: 19 (29 Apr 2021 12:46) (17 - 19)  SpO2: 96% (29 Apr 2021 12:46) (96% - 100%)  Finger Stick        04-28 @ 07:01 - 04-29 @ 07:00  --------------------------------------------------------  IN: 0 mL / OUT: 500 mL / NET: -500 mL    04-29 @ 07:01 - 04-29 @ 12:57  --------------------------------------------------------  IN: 360 mL / OUT: 0 mL / NET: 360 mL        PHYSICAL EXAM:  GENERAL:  [ x ] NAD , [ x ] well appearing, [  ] Agitated, [  ] Drowsy,  [  ] Lethargy, [ x ] confused   HEAD:  [ x ] Normal, [  ] Other  EYES:  [x  ] EOMI, [ x ] PERRLA, [x  ] conjunctiva and sclera clear normal, [  ] Other,  [  ] Pallor,[  ] Discharge  ENMT:  [ x ] Normal, [x  ] Moist mucous membranes, [  ] Good dentition, [  ] No Thrush  NECK:  [ x ] Supple, [ x ] No JVD, [  ] Normal thyroid, [  ] Lymphadenopathy [  ] Other  CHEST/LUNG:  [ x ] Clear to auscultation bilaterally, [ x ] Breath Sounds Decrease,  [x  ] No rales, [ x ] No rhonchi  [ x ]  No wheezing  HEART:  [ x ] Regular rate and rhythm, [  ] tachycardia, [  ] Bradycardia,  [  ] irregular  [ x ] No murmurs, No rubs, No gallops, [  ] PPM in place (Mfr:  )  ABDOMEN:  [x  ] Soft, [ x ] Nontender, [ x ] Nondistended, [  ] No mass, [  ] Bowel sounds present, [  ] obese  NERVOUS SYSTEM:  [ x ] Alert & Oriented X1, [ x ] Nonfocal  [ x ] Confusion  [  ] Encephalopathic [  ] Sedated [  ] Unable to assess, [  ] Other-  EXTREMITIES: [ x ] 2+ Peripheral Pulses, No clubbing, No cyanosis,  [  ] edema B/L lower EXT. [  ] PVD stasis skin changes B/L Lower EXT  LYMPH: No lymphadenopathy noted  SKIN:  [ x ] No rashes or lesions, [  ] Pressure Ulcers, [  ] ecchymosis, [  ] Skin Tears, [  ] Other    DIET: Diet, Soft (04-28-21 @ 20:31) [Active]    LABS:                        12.3   3.89  )-----------( 188      ( 29 Apr 2021 07:25 )             37.3     29 Apr 2021 07:25    142    |  111    |  12     ----------------------------<  64     3.4     |  24     |  0.47     Ca    8.3        29 Apr 2021 07:25  Phos  2.6       29 Apr 2021 07:25  Mg     2.2       29 Apr 2021 07:25    TPro  6.2    /  Alb  3.4    /  TBili  1.2    /  DBili  x      /  AST  11     /  ALT  10     /  AlkPhos  62     29 Apr 2021 07:25    PT/INR - ( 29 Apr 2021 07:25 )   PT: 13.3 sec;   INR: 1.14 ratio         PTT - ( 29 Apr 2021 07:25 )  PTT:36.8 sec              CARDIAC MARKERS ( 28 Apr 2021 15:07 )  <.015 ng/mL / x     / x     / x     / x                 Anemia Panel:  Vitamin B12, Serum: 427 pg/mL (04-29-21 @ 08:57)  Folate, Serum: 18.4 ng/mL (04-29-21 @ 08:57)      Thyroid Panel:  T4, Serum: 5.7 ug/dL (04-29-21 @ 08:57)  Thyroid Stimulating Hormone, Serum: 1.50 uIU/mL (04-29-21 @ 07:25)            Serum Pro-Brain Natriuretic Peptide: 87 pg/mL (04-28-21 @ 15:07)      RADIOLOGY & ADDITIONAL TESTS:  < from: Xray Chest 1 View- PORTABLE-Urgent (Xray Chest 1 View- PORTABLE-Urgent .) (04.28.21 @ 15:08) >  IMPRESSION:   No evidence of active chest disease.    < end of copied text >    < from: CT Angio Chest w/ IV Cont (04.28.21 @ 17:58) >  IMPRESSION:  Acute bilateral pulmonary emboli. No evidence of right heart strain.    < end of copied text >    < from: US Duplex Venous Lower Ext Complete, Bilateral (04.29.21 @ 09:51) >  IMPRESSION:  No evidence of deep venous thrombosis in either lower extremity.    < end of copied text >        HEALTH ISSUES - PROBLEM Dx:  Pulmonary embolism, bilateral    Dementia    HTN (hypertension)    Seasonal allergies    Asthma    Need for prophylactic measure      Consultant(s) Notes Reviewed:  [ x ] YES     Care Discussed with [X] Consultants  [x  ] Patient  [  ] Family  [  ]   [  ] Social Service  [  ] RN, [  ] Physical Therapy  DVT PPX: [ x ] Lovenox, [  ] S C Heparin, [  ] Coumadin, [  ] Xarelto, [  ] Eliquis, [  ] Pradaxa, [  ] IV Heparin drip, [  ] SCD [  ] Contraindication 2 to GI Bleed,[  ] Ambulation  Advanced directive: [ x ] None, [  ] DNR/DNI Patient is a 80y old  Female who presents with a chief complaint of     81 yo female PMH dementia, HTN, asthma controlled, seasonal allergies presents to ED c/o SOB. Onset this AM. Hx obtained from daughter Yanelis Fraire as patient has hx of dementia with significant mental decline for past 1-2 months. Daughter states on Monday AM patient went to neighbor's house stating she was SOB. Neighbor called the ambulance and patient was transferred to [facility] and diagnosed with Covid, daughter does not know how patient got covid as patient is largely home bound with no visitors. Patient was d/c the same day (4/26) with albuterol inhaler and Losartan as pt was hypertensive. This AM, patient had similar episode where she went to [facility] and c/o SOB, so neighbor called ambulance and patient was transferred to [place]. Patient seen and examined at bedside, is a poor historian, A&Ox1 (person). Patient states she was SOB this AM but does not know why she is here. C/o chest tightness but denies cough, wheezing, CP, palpitations, fevers, chills, nausea, abd pain. When asked if patient was in hospital on Monday, she replied "I suppose so." Per daughter, patient with significant mental decline since March. Patient received the Covid vaccine 3/26 and since then has had a progressive decline in mental status. Prior to March patient was A&Ox3 and independent, however since then patient has had increased confusion, found wandering to neighbor's houses. Patient does not have a PCP, has seen Dr. Clements once for pre-op clearance for cataract surgery 1 year ago. Daughter states they have been trying to make an appointment with a neurologist for her cognitive decline but have been unable to do so. Of note, patient has a history of hypotension and has never been on anti-hypertensive medications until Monday. Daughter states she was very surprised that her mother was found to be hypertensive and prescribed Losartan. Patient was given one dose yesterday.     In ED:  Vitals: T 98, HR 63, /76, RR 18, SpO2 100% on RA  Labs significant for: WBC 3.77, D-dimer 672, Cl 112, anion gap 2, AST 12, ALT 11  CTA: + acute bilateral pulmonary emboli, no evidence of R heart strain  CT Head: Small vessel ischemic changes in both hemispheres with volume loss and involutional change. No acute infarct or hemorrhagic lesion noted. Right frontal meningioma measuring approximately 1.1 cm.  EKG: NSR, rate 62, T wave abnormality   Given: 1L NS bolus x1, Albuterol inhaler x1, Lovenox 50mg x1  COVID 19-PCR -NEG    INTERVAL HPI:   4/29/21: Pt admitted overnight for acute b/l PE. Pt seen and examined. Pt A&Ox1, unsure of why she is here. Pleasant, on RA and denies SOB. Has no acute complaints this AM. On lovenox 50 mg daily for PE. CXR no acute pulm disease, US duplex no dvt b/l. F/u TTE.       OVERNIGHT EVENTS: none     Home Medications:  losartan 50 mg oral tablet: 1 tab(s) orally once a day (28 Apr 2021 19:49)      MEDICATIONS  (STANDING):  enoxaparin Injectable 50 milliGRAM(s) SubCutaneous every 12 hours  loratadine 10 milliGRAM(s) Oral daily  losartan 50 milliGRAM(s) Oral daily  pantoprazole    Tablet 40 milliGRAM(s) Oral before breakfast  potassium chloride   Powder 40 milliEquivalent(s) Oral every 4 hours    MEDICATIONS  (PRN):  ALBUTerol    90 MICROgram(s) HFA Inhaler 1 Puff(s) Inhalation every 4 hours PRN Shortness of Breath and/or Wheezing      Allergies    No Known Allergies    Intolerances        REVIEW OF SYSTEMS: "I feel great"  CONSTITUTIONAL: No fever, No chills, No fatigue, No myalgia, No Body ache, No Weakness  EYES: No eye pain,  No visual disturbances, No discharge, NO Redness  ENMT:  No ear pain, No nose bleed, No vertigo; No sinus or throat pain, No Congestion  NECK: No pain, No stiffness  RESPIRATORY: No cough, wheezing, No  hemoptysis, No shortness of breath  CARDIOVASCULAR: No chest pain, palpitations  GASTROINTESTINAL: No abdominal or epigastric pain. No nausea, No vomiting; No diarrhea or constipation. [  ] BM  GENITOURINARY: No dysuria, No frequency, No urgency, No hematuria, or incontinence  NEUROLOGICAL: No headaches, No dizziness, No numbness, No tingling, No tremors, No weakness  EXT: No Swelling, No Pain, No Edema  SKIN:  [ x ] No itching, burning, rashes, or lesions   MUSCULOSKELETAL: No joint pain or swelling; No muscle pain, No back pain, No extremity pain  PSYCHIATRIC: No depression, anxiety, mood swings or difficulty sleeping at night  PAIN SCALE: [x  ] None  [  ] Other-  ROS Unable to obtain due to - [  ] Dementia  [  ] Lethargy  [  ] Sedated [  ] non verbal  REST OF REVIEW Of SYSTEM - [ x ] Normal     Vital Signs Last 24 Hrs  T(C): 36.4 (29 Apr 2021 12:46), Max: 36.9 (28 Apr 2021 21:38)  T(F): 97.6 (29 Apr 2021 12:46), Max: 98.4 (28 Apr 2021 21:38)  HR: 75 (29 Apr 2021 12:46) (63 - 79)  BP: 158/81 (29 Apr 2021 12:46) (121/71 - 179/82)  BP(mean): --  RR: 19 (29 Apr 2021 12:46) (17 - 19)  SpO2: 96% (29 Apr 2021 12:46) (96% - 100%)  Finger Stick        04-28 @ 07:01 - 04-29 @ 07:00  --------------------------------------------------------  IN: 0 mL / OUT: 500 mL / NET: -500 mL    04-29 @ 07:01 - 04-29 @ 12:57  --------------------------------------------------------  IN: 360 mL / OUT: 0 mL / NET: 360 mL      PHYSICAL EXAM:  GENERAL:  [ x ] NAD , [ x ] well appearing, [  ] Agitated, [  ] Drowsy,  [  ] Lethargy, [ x ] confused   HEAD:  [ x ] Normal, [  ] Other  EYES:  [x  ] EOMI, [ x ] PERRLA, [x  ] conjunctiva and sclera clear normal, [  ] Other,  [  ] Pallor,[  ] Discharge  ENMT:  [ x ] Normal, [x  ] Moist mucous membranes, [  ] Good dentition, [ x ] No Thrush  NECK:  [ x ] Supple, [ x ] No JVD, [ x ] Normal thyroid, [  ] Lymphadenopathy [  ] Other  CHEST/LUNG:  [ x ] Clear to auscultation bilaterally, [ x ] Breath Sounds Decrease,  [x  ] No rales, [ x ] No rhonchi  [ x ]  No wheezing  HEART:  [ x ] Regular rate and rhythm, [  ] tachycardia, [  ] Bradycardia,  [  ] irregular  [ x ] No murmurs, No rubs, No gallops, [  ] PPM in place (Mfr:  )  ABDOMEN:  [x  ] Soft, [ x ] Nontender, [ x ] Nondistended, [ x ] No mass, [ x ] Bowel sounds present, [  ] obese  NERVOUS SYSTEM:  [ x ] Alert & Oriented X1, [ x ] Nonfocal  [ x ] Confusion  [  ] Encephalopathic [  ] Sedated [  ] Unable to assess, [  ] Other-  EXTREMITIES: [ x ] 2+ Peripheral Pulses, No clubbing, No cyanosis,  [  ] edema B/L lower EXT. [  ] PVD stasis skin changes B/L Lower EXT  LYMPH: No lymphadenopathy noted  SKIN:  [ x ] No rashes or lesions, [  ] Pressure Ulcers, [  ] ecchymosis, [  ] Skin Tears, [  ] Other    DIET: Diet, Soft (04-28-21 @ 20:31) [Active]    LABS:                        12.3   3.89  )-----------( 188      ( 29 Apr 2021 07:25 )             37.3     29 Apr 2021 07:25    142    |  111    |  12     ----------------------------<  64     3.4     |  24     |  0.47     Ca    8.3        29 Apr 2021 07:25  Phos  2.6       29 Apr 2021 07:25  Mg     2.2       29 Apr 2021 07:25    TPro  6.2    /  Alb  3.4    /  TBili  1.2    /  DBili  x      /  AST  11     /  ALT  10     /  AlkPhos  62     29 Apr 2021 07:25    PT/INR - ( 29 Apr 2021 07:25 )   PT: 13.3 sec;   INR: 1.14 ratio         PTT - ( 29 Apr 2021 07:25 )  PTT:36.8 sec      CARDIAC MARKERS ( 28 Apr 2021 15:07 )  <.015 ng/mL / x     / x     / x     / x        Anemia Panel:  Vitamin B12, Serum: 427 pg/mL (04-29-21 @ 08:57)  Folate, Serum: 18.4 ng/mL (04-29-21 @ 08:57)      Thyroid Panel:  T4, Serum: 5.7 ug/dL (04-29-21 @ 08:57)  Thyroid Stimulating Hormone, Serum: 1.50 uIU/mL (04-29-21 @ 07:25)    Serum Pro-Brain Natriuretic Peptide: 87 pg/mL (04-28-21 @ 15:07)      RADIOLOGY & ADDITIONAL TESTS:  < from: Xray Chest 1 View- PORTABLE-Urgent (Xray Chest 1 View- PORTABLE-Urgent .) (04.28.21 @ 15:08) >  IMPRESSION:   No evidence of active chest disease.    < end of copied text >    < from: CT Angio Chest w/ IV Cont (04.28.21 @ 17:58) >  IMPRESSION:  Acute bilateral pulmonary emboli. No evidence of right heart strain.    < end of copied text >    < from: US Duplex Venous Lower Ext Complete, Bilateral (04.29.21 @ 09:51) >  IMPRESSION:  No evidence of deep venous thrombosis in either lower extremity.    < end of copied text >    HEALTH ISSUES - PROBLEM Dx:  Pulmonary embolism, bilateral    Dementia    HTN (hypertension)    Seasonal allergies    Asthma    Need for prophylactic measure      Consultant(s) Notes Reviewed:  [ x ] YES     Care Discussed with [X] Consultants  [x  ] Patient  [ x ] Family  [  ]   [  ] Social Service  [ x ] RN, [  ] Physical Therapy  DVT PPX: [ x ] Lovenox, [  ] S C Heparin, [  ] Coumadin, [  ] Xarelto, [  ] Eliquis, [  ] Pradaxa, [  ] IV Heparin drip, [  ] SCD [  ] Contraindication 2 to GI Bleed,[  ] Ambulation  Advanced directive: [ x ] None, [  ] DNR/DNI

## 2021-04-29 NOTE — PROGRESS NOTE ADULT - NSICDXPROBLEMPLAN2_GEN_ALL_CORE_FT
-Per daughter, patient with increased confusion past 1-2 months  -CT head +Small vessel ischemic changes in both hemispheres with volume loss and involutional change. No acute infarct or hemorrhagic lesion noted. Right frontal meningioma measuring approximately 1.1 cm.  -A&Ox1 at baseline on admission  -Likely 2/2 worsening dementia vs. underlying infectious process   -COVID PCR neg, RVP neg, UA  -Thyroid panel, B12, folate all wnl  -Fall precautions, aspiration precautions  -PT consulted, f/u recs   -Neuro consulted Dr. Corea, recs appreciated   -Palliative Santhosh consulted for GO discussion  -SW/CM consult

## 2021-04-29 NOTE — PROGRESS NOTE ADULT - NSICDXPROBLEMPLAN1_GEN_ALL_CORE_FT
-CTA showing acute bilateral pulmonary emboli, no evidence of R heart strain  -EKG showing NSR, rate 62, t wave abnormality, no signs of R heart strain  -Continue to monitor on tele with cont pulse ox  -Per daughter, patient +Covid PCR on Monday at [facility], Covid PCR neg and RVP negative in ER 4/28. CXR no active disease. Unlikely COVID-19, PE likely provoked in setting of sedentary lifestyle.   -Continue Lovenox 50mg BID  -B/l dopplers lower extremities neg for DVT   -CXR neg for active pulm disease   -F/u TTE  -Monitor for signs/symptoms of bleeding  -Pulm Dr. Spangler consulted  -Heme Dr. Bah consulted -CTA showing acute bilateral pulmonary emboli, no evidence of R heart strain   -EKG showing NSR, rate 62, t wave abnormality, no signs of R heart strain  -Continue to monitor on tele with cont pulse ox  -Per daughter, patient +Covid PCR on Monday at [facility], Covid PCR neg and RVP negative in ER 4/28. CXR no active disease. Unlikely COVID-19, PE likely 2/2 COVID   -Continue Lovenox 50mg BID  -B/l dopplers lower extremities neg for DVT   -CXR neg for active pulm disease   -F/u TTE  -Monitor for signs/symptoms of bleeding  -Pulm Dr. Spangler consulted  -Heme Dr. Bah d/w [facility] records reviewd , pt was COVID + 4/26, s/p monoclonal AB given , Pt has been Fully Vaccinated 2 inj completed. pt's initial SOB was 1 week prior to [facility].

## 2021-04-29 NOTE — CONSULT NOTE ADULT - ASSESSMENT
79 yo female PMH dementia, HTN, asthma controlled, seasonal allergies presents to ED c/o SOB, onset this AM. Patient discharged from [facility] on Monday 4/16 and diagnosed with Covid at that time. Patient admitted for acute bilat pulmonary emboli found on CTA.   dementia - assist with needs and ADL  monitor VS and HD and Sat  proBNP and CE noted - no evidence of RV strain  TTE and LE doppler pending  CT chest reviewed - bilateral PE - on Lovenox BID weight based  Covid PCR neg  Asthma - monitor for sx - may need PRN bronchodilators -

## 2021-04-29 NOTE — CONSULT NOTE ADULT - SUBJECTIVE AND OBJECTIVE BOX
Date/Time Patient Seen:  		  Referring MD:   Data Reviewed	       Patient is a 80y old  Female who presents with a chief complaint of     Subjective/HPI  in bed  seen and examined  vs and meds reviewed  labs reviewed  h and p reviewed  er provider note reviewed  ct chest reviewed  ct head noted    79 yo female PMH dementia, HTN, asthma controlled, seasonal allergies presents to ED c/o SOB. Onset this AM. Hx obtained from daughter Yanelis Fraire as patient has hx of dementia with significant mental decline for past 1-2 months. Daughter states on Monday AM patient went to [employer] stating she was SOB. Neighbor called the ambulance and patient was transferred to [facility] and diagnosed with Covid, daughter does not know how patient got covid as patient is largely home bound with no visitors. Patient was d/c the same day (4/26) with albuterol inhaler and Losartan as pt was hypertensive. This AM, patient had similar episode where she went to [facility] and c/o SOB, so neighbor called ambulance and patient was transferred to [place]. Patient seen and examined at bedside, is a poor historian, A&Ox1 (person). Patient states she was SOB this AM but does not know why she is here. C/o chest tightness but denies cough, wheezing, CP, palpitations, fevers, chills, nausea, abd pain. When asked if patient was in hospital on Monday, she replied "I suppose so." Per daughter, patient with significant mental decline since March. Patient received the Covid vaccine 3/26 and since then has had a progressive decline in mental status. Prior to March patient was A&Ox3 and independent, however since then patient has had increased confusion, found wandering to [employer]. Patient does not have a PCP, has seen Dr. Clements once for pre-op clearance for cataract surgery 1 year ago. Daughter states they have been trying to make an appointment with a neurologist for her cognitive decline but have been unable to do so. Of note, patient has a history of hypotension and has never been on anti-hypertensive medications until Monday. Daughter states she was very surprised that her mother was found to be hypertensive and prescribed Losartan. Patient was given one dose yesterday.     FAMILY HISTORY:  No pertinent family history in first degree relatives, Patient denies.     No Pertinent Family History in first degree relatives of: Patient denies.     Social History:  Social History (marital status, living situation, occupation, tobacco use, alcohol and drug use, and sexual history): Tobacco: former smoker  EtOH: denies  Recreational drug use: denies  Lives with: alone at home  Ambulates: independent  ADLs: significant decline past 2 months, now family goes to house everyday to help  Vaccinations: Covid 2/2 completed 4/14     Tobacco Screening:  · Core Measure Site	Yes  · Has the patient used tobacco in the past 30 days?	No       PAST MEDICAL & SURGICAL HISTORY:  No pertinent past medical history    Uncomplicated asthma, unspecified asthma severity  past history - currently stable with no medications    HTN (hypertension)    No significant past surgical history    S/P cataract surgery    History of partial hysterectomy          Medication list         MEDICATIONS  (STANDING):  enoxaparin Injectable 50 milliGRAM(s) SubCutaneous every 12 hours  loratadine 10 milliGRAM(s) Oral daily  losartan 50 milliGRAM(s) Oral daily  pantoprazole    Tablet 40 milliGRAM(s) Oral before breakfast    MEDICATIONS  (PRN):  ALBUTerol    90 MICROgram(s) HFA Inhaler 1 Puff(s) Inhalation every 4 hours PRN Shortness of Breath and/or Wheezing         Vitals log        ICU Vital Signs Last 24 Hrs  T(C): 36.4 (29 Apr 2021 05:08), Max: 36.9 (28 Apr 2021 21:38)  T(F): 97.6 (29 Apr 2021 05:08), Max: 98.4 (28 Apr 2021 21:38)  HR: 73 (29 Apr 2021 05:08) (63 - 79)  BP: 162/65 (29 Apr 2021 05:08) (121/71 - 179/82)  BP(mean): --  ABP: --  ABP(mean): --  RR: 18 (29 Apr 2021 05:08) (17 - 18)  SpO2: 96% (29 Apr 2021 05:08) (96% - 100%)           Input and Output:  I&O's Detail    28 Apr 2021 07:01  -  29 Apr 2021 06:21  --------------------------------------------------------  IN:  Total IN: 0 mL    OUT:    Voided (mL): 500 mL  Total OUT: 500 mL    Total NET: -500 mL          Lab Data                        13.4   3.77  )-----------( 218      ( 28 Apr 2021 15:07 )             39.6     04-28    143  |  112<H>  |  14  ----------------------------<  82  4.7   |  29  |  0.65    Ca    8.8      28 Apr 2021 15:07    TPro  6.8  /  Alb  3.6  /  TBili  0.9  /  DBili  x   /  AST  12<L>  /  ALT  11<L>  /  AlkPhos  68  04-28      CARDIAC MARKERS ( 28 Apr 2021 15:07 )  <.015 ng/mL / x     / x     / x     / x            Review of Systems	  sob  lopes      Objective     Physical Examination    heart s1s2  lung dc BS  abd soft  head nc      Pertinent Lab findings & Imaging      Lozano:  NO   Adequate UO     I&O's Detail    28 Apr 2021 07:01  -  29 Apr 2021 06:21  --------------------------------------------------------  IN:  Total IN: 0 mL    OUT:    Voided (mL): 500 mL  Total OUT: 500 mL    Total NET: -500 mL               Discussed with:     Cultures:	        Radiology        EXAM:  CT ANGIO CHEST (W)AW IC                            PROCEDURE DATE:  04/28/2021          INTERPRETATION:  CLINICAL INFORMATION: Shortness of breath. Elevated d-dimer.    COMPARISON: None.    CONTRAST/COMPLICATIONS:  IV Contrast: Omnipaque 350  52 cc administered   48 cc discarded  Oral Contrast: NONE  Complications: None reported at time of study completion    PROCEDURE:  CT Angiography of the Chest.  Sagittal and coronal reformats were performed as well as 3D (MIP) reconstructions.    FINDINGS:    LUNGS AND AIRWAYS: Patent central airways.  Right upper lobe calcified granuloma. No parenchymal consolidation.  PLEURA: No pleural effusion.  MEDIASTINUM AND SASHA: No lymphadenopathy.  VESSELS: Acute pulmonary emboli involving segmental branches of the right lower lobe as well as subsegmental branches of the right upper lobe and left upper lobe.  HEART: Heart size is normal. No evidence of right heart strain. No pericardial effusion.  CHEST WALL AND LOWER NECK: Within normal limits.  VISUALIZED UPPER ABDOMEN: Within normal limits.  BONES: Degenerative changes. Mild T12 compression deformity, age indeterminate.    IMPRESSION:  Acute bilateral pulmonary emboli. No evidence of right heart strain.    Findings were discussed with HAYLEY Calzada 4/28/2021 6:34 PM by Dr. Saini with read back confirmation.            MARYELLEN SAINI MD; Attending Radiologist  This document has been electronically signed. Apr 28 2021  6:36PM                      
Patient is a 80y old  Female who presents with a chief complaint of     HPI:  81 yo female PMH dementia, HTN, asthma controlled, seasonal allergies presents to ED c/o SOB. Onset this AM. Hx obtained from daughter Yanelis Fraire as patient has hx of dementia with significant mental decline for past 1-2 months. Daughter states on Monday AM patient went to neighbor's house stating she was SOB. Neighbor called the ambulance and patient was transferred to [facility] and diagnosed with Covid, daughter does not know how patient got covid as patient is largely home bound with no visitors. Patient was d/c the same day (4/26) with albuterol inhaler and Losartan as pt was hypertensive. This AM, patient had similar episode where she went to neighbor's house and c/o SOB, so neighbor called ambulance and patient was transferred to [place]. Patient seen and examined at bedside, is a poor historian, A&Ox1 (person). Patient states she was SOB this AM but does not know why she is here. C/o chest tightness but denies cough, wheezing, CP, palpitations, fevers, chills, nausea, abd pain. When asked if patient was in hospital on Monday, she replied "I suppose so." Per daughter, patient with significant mental decline since March. Patient received the Covid vaccine 3/26 and since then has had a progressive decline in mental status. Prior to March patient was A&Ox3 and independent, however since then patient has had increased confusion, found wandering to neighbor's houses. Patient does not have a PCP, has seen Dr. Clements once for pre-op clearance for cataract surgery 1 year ago. Daughter states they have been trying to make an appointment with a neurologist for her cognitive decline but have been unable to do so. Of note, patient has a history of hypotension and has never been on anti-hypertensive medications until Monday. Daughter states she was very surprised that her mother was found to be hypertensive and prescribed Losartan. Patient was given one dose yesterday.     In ED:  Vitals: T 98, HR 63, /76, RR 18, SpO2 100% on RA  Labs significant for: WBC 3.77, D-dimer 672, Cl 112, anion gap 2, AST 12, ALT 11  CTA: + acute bilateral pulmonary emboli, no evidence of R heart strain  CT Head: Small vessel ischemic changes in both hemispheres with volume loss and involutional change. No acute infarct or hemorrhagic lesion noted. Right frontal meningioma measuring approximately 1.1 cm.  EKG: NSR, rate 62, T wave abnormality   Given: 1L NS bolus x1, Albuterol inhaler x1, Lovenox 50mg x1  COVID 19-PCR -NEG       (28 Apr 2021 19:49)       ROS:  Negative except for:    PAST MEDICAL & SURGICAL HISTORY:  Uncomplicated asthma, unspecified asthma severity  past history - currently stable with no medications    HTN (hypertension)    S/P cataract surgery    History of partial hysterectomy        SOCIAL HISTORY:    FAMILY HISTORY:  No pertinent family history in first degree relatives  Patient denies        MEDICATIONS  (STANDING):  enoxaparin Injectable 50 milliGRAM(s) SubCutaneous every 12 hours  loratadine 10 milliGRAM(s) Oral daily  losartan 50 milliGRAM(s) Oral daily  pantoprazole    Tablet 40 milliGRAM(s) Oral before breakfast  potassium chloride   Powder 40 milliEquivalent(s) Oral every 4 hours    MEDICATIONS  (PRN):  ALBUTerol    90 MICROgram(s) HFA Inhaler 1 Puff(s) Inhalation every 4 hours PRN Shortness of Breath and/or Wheezing      Allergies    No Known Allergies    Intolerances        Vital Signs Last 24 Hrs  T(C): 36.4 (29 Apr 2021 12:46), Max: 36.9 (28 Apr 2021 21:38)  T(F): 97.6 (29 Apr 2021 12:46), Max: 98.4 (28 Apr 2021 21:38)  HR: 75 (29 Apr 2021 12:46) (63 - 79)  BP: 158/81 (29 Apr 2021 12:46) (127/76 - 179/82)  BP(mean): --  RR: 19 (29 Apr 2021 12:46) (18 - 19)  SpO2: 96% (29 Apr 2021 12:46) (96% - 100%)    PHYSICAL EXAM  General: adult in NAD, chronically ill appearing  HEENT: clear oropharynx, anicteric sclera, pink conjunctivae  Neck: supple  CV: normal S1S2 with no murmur rubs or gallops  Lungs: clear to auscultation, no wheezes, no rhales  Abdomen: soft non-tender non-distended, no hepato/splenomegaly  Ext: no clubbing cyanosis or edema  Skin: no rashes and no petichiae  Neuro: alert and not oriented      LABS:    CBC Full  -  ( 29 Apr 2021 07:25 )  WBC Count : 3.89 K/uL  RBC Count : 3.89 M/uL  Hemoglobin : 12.3 g/dL  Hematocrit : 37.3 %  Platelet Count - Automated : 188 K/uL  Mean Cell Volume : 95.9 fl  Mean Cell Hemoglobin : 31.6 pg  Mean Cell Hemoglobin Concentration : 33.0 gm/dL  Auto Neutrophil # : 1.61 K/uL  Auto Lymphocyte # : 1.45 K/uL  Auto Monocyte # : 0.45 K/uL  Auto Eosinophil # : 0.35 K/uL  Auto Basophil # : 0.02 K/uL  Auto Neutrophil % : 41.3 %  Auto Lymphocyte % : 37.3 %  Auto Monocyte % : 11.6 %  Auto Eosinophil % : 9.0 %  Auto Basophil % : 0.5 %    04-29    142  |  111<H>  |  12  ----------------------------<  64<L>  3.4<L>   |  24  |  0.47<L>    Ca    8.3<L>      29 Apr 2021 07:25  Phos  2.6     04-29  Mg     2.2     04-29    TPro  6.2  /  Alb  3.4  /  TBili  1.2  /  DBili  x   /  AST  11<L>  /  ALT  10<L>  /  AlkPhos  62  04-29    PT/INR - ( 29 Apr 2021 07:25 )   PT: 13.3 sec;   INR: 1.14 ratio         PTT - ( 29 Apr 2021 07:25 )  PTT:36.8 sec          BLOOD SMEAR INTERPRETATION:    RADIOLOGY & ADDITIONAL STUDIES:    < from: CT Angio Chest w/ IV Cont (04.28.21 @ 17:58) >  EXAM:  CT ANGIO CHEST (W)AW IC                            PROCEDURE DATE:  04/28/2021          INTERPRETATION:  CLINICAL INFORMATION: Shortness of breath. Elevated d-dimer.    COMPARISON: None.    CONTRAST/COMPLICATIONS:  IV Contrast: Omnipaque 350 52 cc administered   48 cc discarded  Oral Contrast: NONE  Complications: None reported at time of study completion    PROCEDURE:  CT Angiography of the Chest.  Sagittal and coronal reformats were performed as well as 3D (MIP) reconstructions.    FINDINGS:    LUNGS AND AIRWAYS: Patent central airways.  Right upper lobe calcified granuloma. No parenchymal consolidation.  PLEURA: No pleural effusion.  MEDIASTINUM AND SASHA: No lymphadenopathy.  VESSELS: Acute pulmonary emboli involving segmental branches of the right lower lobe as well as subsegmental branches of the right upper lobe and left upper lobe.  HEART: Heart size is normal. No evidence of right heart strain. No pericardial effusion.  CHEST WALL AND LOWER NECK: Within normal limits.  VISUALIZED UPPER ABDOMEN: Within normal limits.  BONES: Degenerative changes. Mild T12 compression deformity, age indeterminate.    IMPRESSION:  Acute bilateral pulmonary emboli. No evidence of right heart strain.    Findings were discussed with HAYLEY Calzada 4/28/2021 6:34 PM by Dr. Saini with read back confirmation.            MARYELLEN SAINI MD; Attending Radiologist  This document has been electronically signed. Apr 28 2021  6:36PM    < end of copied text >    < from: US Duplex Venous Lower Ext Complete, Bilateral (04.29.21 @ 09:51) >  EXAM:  US DPLX LWR EXT VEINS COMPL BI                            PROCEDURE DATE:  04/29/2021          INTERPRETATION:  CLINICAL INFORMATION: Acute bilateral pulmonary emboli.    COMPARISON: 3/28/2017    TECHNIQUE: Duplex sonography of the BILATERALLOWER extremity veins with color and spectral Doppler, with and without compression.    FINDINGS:    RIGHT:  Normal compressibility of the RIGHT common femoral, femoral and popliteal veins.  Doppler examination shows normal spontaneous and phasic flow.  No RIGHT calf vein thrombosis is detected.    LEFT:  Normal compressibility of the LEFT common femoral, femoral and popliteal veins.  Doppler examination shows normal spontaneous and phasic flow.  No LEFT calf vein thrombosis is detected.    IMPRESSION:  No evidence of deep venous thrombosis in either lower extremity.                DENZEL CHAN MD; Attending Radiologist  This document has been electronically signed. Apr 29 2021 10:01AM    < end of copied text >      
[facility] DIVISION of  INFECTIOUS DISEASE  Sarmad Yen MD PhD, Cielo Paniagua MD, Elvie Wallace MD, Gianni Mandujano MD  and providing coverage with Naomi Lira MD and Darrell Mart MD  Providing Infectious Disease Consultations at [facility]    Office# [phone] to schedule follow up appointments  Answering Service for urgent calls or New Consults [phone]  Cell# to text for urgent issues Sarmad Yen [phone]     HPI:  81 yo female PMH dementia, HTN, asthma controlled, seasonal allergies presented to ED c/o SOB.  patient has hx of dementia with significant mental decline for past 1-2 months. Daughter states on Monday AM patient went to [facility] stating she was SOB. Onset of increasing symptoms 4/18. [facility] called the ambulance and patient was transferred to [facility] and diagnosed with Covid,+ 4/26 Rxed  with Regen, was observation   Patient was d/c the same day (4/26) with albuterol inhaler and Losartan as pt was hypertensive. The patient had similar episode where she went to [facility] and c/o SOB, so neighbor called ambulance and patient was transferred to [place]. Patient seen and examined at bedside, is a poor historian, A&Ox1 (person). Patient states she was SOB this AM but does not know why she is here. C/o chest tightness but denies cough, wheezing, CP, palpitations, fevers, chills, nausea, abd pain. When asked if patient was in hospital on Monday, she replied "I suppose so." Per daughter, patient with significant mental decline since March. Patient received the Pfizer  Covid vaccine  3/26   and since then has had a progressive decline in mental status 2nd dose Vaccine was given 4/16 , Prior to March patient was A&Ox3 and independent, however since then patient has had increased confusion, found wandering to [facility]. Patient does not have a PCP, has seen Dr. Clements once for pre-op clearance for cataract surgery 1 year ago. Daughter states they have been trying to make an appointment with a neurologist for her cognitive decline but have been unable to do so.      In ED:  Vitals: T 98, HR 63, /76, RR 18, SpO2 100% on RA  Labs significant for: WBC 3.77, D-dimer 672, Cl 112, anion gap 2, AST 12, ALT 11  CTA: + acute bilateral pulmonary emboli, no evidence of R heart strain  CT Head: Small vessel ischemic changes in both hemispheres with volume loss and involutional change. No acute infarct or hemorrhagic lesion noted. Right frontal meningioma measuring approximately 1.1 cm.  EKG: NSR, rate 62, T wave abnormality   Given: 1L NS bolus x1, Albuterol inhaler x1, Lovenox 50mg x1  COVID 19-PCR -NEG       (28 Apr 2021 19:49)      PAST MEDICAL & SURGICAL HISTORY:  Uncomplicated asthma, unspecified asthma severity  past history - currently stable with no medications    HTN (hypertension)    S/P cataract surgery    History of partial hysterectomy        Antimicrobials      Immunological      Other  ALBUTerol    90 MICROgram(s) HFA Inhaler 1 Puff(s) Inhalation every 4 hours PRN  enoxaparin Injectable 50 milliGRAM(s) SubCutaneous every 12 hours  loratadine 10 milliGRAM(s) Oral daily  losartan 50 milliGRAM(s) Oral daily  pantoprazole    Tablet 40 milliGRAM(s) Oral before breakfast  potassium chloride   Powder 40 milliEquivalent(s) Oral every 4 hours      Allergies    No Known Allergies    Intolerances        SOCIAL HISTORY:  Social History:  Tobacco: former smoker  EtOH: denies  Recreational drug use: denies  Lives with: alone at home  Ambulates: independent  ADLs: significant decline past 2 months, now family goes to house everyday to help  Vaccinations: Covid 2/2 completed 4/14 (28 Apr 2021 19:49)      FAMILY HISTORY:  No pertinent family history in first degree relatives  Patient denies        ROS:    limited    Vital Signs Last 24 Hrs  T(C): 36.4 (29 Apr 2021 12:46), Max: 36.9 (28 Apr 2021 21:38)  T(F): 97.6 (29 Apr 2021 12:46), Max: 98.4 (28 Apr 2021 21:38)  HR: 75 (29 Apr 2021 12:46) (63 - 79)  BP: 158/81 (29 Apr 2021 12:46) (127/76 - 179/82)  BP(mean): --  RR: 19 (29 Apr 2021 12:46) (18 - 19)  SpO2: 96% (29 Apr 2021 12:46) (96% - 100%)    PE:    HEENT:  NC, atraumatic  Lungs:  No accessory muscle use, breathing comfortably  Cor:  distant  Abd:  Symmetric, appears normal,   Extrem:  No cyanosis        LABS:                        12.3   3.89  )-----------( 188      ( 29 Apr 2021 07:25 )             37.3       WBC Count: 3.89 K/uL (04-29-21 @ 07:25)  WBC Count: 3.77 K/uL (04-28-21 @ 15:07)      04-29    142  |  111<H>  |  12  ----------------------------<  64<L>  3.4<L>   |  24  |  0.47<L>    Ca    8.3<L>      29 Apr 2021 07:25  Phos  2.6     04-29  Mg     2.2     04-29    TPro  6.2  /  Alb  3.4  /  TBili  1.2  /  DBili  x   /  AST  11<L>  /  ALT  10<L>  /  AlkPhos  62  04-29      Creatinine, Serum: 0.47 mg/dL (04-29-21 @ 07:25)  Creatinine, Serum: 0.65 mg/dL (04-28-21 @ 15:07)              COVID RISK SCORE:  Auto Neutrophil #: 1.61 K/uL (04-29-21 @ 07:25)  Auto Lymphocyte #: 1.45 K/uL (04-29-21 @ 07:25)  Auto Neutrophil #: 1.56 K/uL (04-28-21 @ 15:07)  Auto Lymphocyte #: 1.39 K/uL (04-28-21 @ 15:07)      Auto Eosinophil #: 0.35 K/uL (04-29-21 @ 07:25)  Auto Eosinophil #: 0.34 K/uL (04-28-21 @ 15:07)          Troponin I, Serum: <.015 ng/mL (04-28-21 @ 15:07)                INR: 1.14 ratio (04-29-21 @ 07:25)  Activated Partial Thromboplastin Time: 36.8 sec (04-29-21 @ 07:25)    D-Dimer Assay, Quantitative: 672 ng/mL DDU (04-28-21 @ 15:07)        MICROBIOLOGY:      RADIOLOGY & ADDITIONAL STUDIES:    --

## 2021-04-29 NOTE — PROGRESS NOTE ADULT - SUBJECTIVE AND OBJECTIVE BOX
neuro cons dict  seen for cognitive impairment;  likely dementia  PE  ct head- no cva  brain mri without  b12/folate /tsh  would follow up.

## 2021-04-29 NOTE — BH CONSULTATION LIAISON ASSESSMENT NOTE - NSBHCHARTREVIEWVS_PSY_A_CORE FT
Vital Signs Last 24 Hrs  T(C): 36.4 (29 Apr 2021 12:46), Max: 36.9 (28 Apr 2021 21:38)  T(F): 97.6 (29 Apr 2021 12:46), Max: 98.4 (28 Apr 2021 21:38)  HR: 75 (29 Apr 2021 12:46) (63 - 79)  BP: 158/81 (29 Apr 2021 12:46) (127/76 - 179/82)  BP(mean): --  RR: 19 (29 Apr 2021 12:46) (18 - 19)  SpO2: 96% (29 Apr 2021 12:46) (96% - 100%)

## 2021-04-30 LAB
ANION GAP SERPL CALC-SCNC: 4 MMOL/L — LOW (ref 5–17)
BUN SERPL-MCNC: 13 MG/DL — SIGNIFICANT CHANGE UP (ref 7–23)
CALCIUM SERPL-MCNC: 9.3 MG/DL — SIGNIFICANT CHANGE UP (ref 8.5–10.1)
CHLORIDE SERPL-SCNC: 110 MMOL/L — HIGH (ref 96–108)
CO2 SERPL-SCNC: 28 MMOL/L — SIGNIFICANT CHANGE UP (ref 22–31)
CREAT SERPL-MCNC: 0.67 MG/DL — SIGNIFICANT CHANGE UP (ref 0.5–1.3)
GLUCOSE SERPL-MCNC: 79 MG/DL — SIGNIFICANT CHANGE UP (ref 70–99)
HCT VFR BLD CALC: 39.7 % — SIGNIFICANT CHANGE UP (ref 34.5–45)
HGB BLD-MCNC: 13 G/DL — SIGNIFICANT CHANGE UP (ref 11.5–15.5)
MAGNESIUM SERPL-MCNC: 2.3 MG/DL — SIGNIFICANT CHANGE UP (ref 1.6–2.6)
MCHC RBC-ENTMCNC: 31.1 PG — SIGNIFICANT CHANGE UP (ref 27–34)
MCHC RBC-ENTMCNC: 32.7 GM/DL — SIGNIFICANT CHANGE UP (ref 32–36)
MCV RBC AUTO: 95 FL — SIGNIFICANT CHANGE UP (ref 80–100)
NRBC # BLD: 0 /100 WBCS — SIGNIFICANT CHANGE UP (ref 0–0)
PHOSPHATE SERPL-MCNC: 2.4 MG/DL — LOW (ref 2.5–4.5)
PLATELET # BLD AUTO: 218 K/UL — SIGNIFICANT CHANGE UP (ref 150–400)
POTASSIUM SERPL-MCNC: 4 MMOL/L — SIGNIFICANT CHANGE UP (ref 3.5–5.3)
POTASSIUM SERPL-SCNC: 4 MMOL/L — SIGNIFICANT CHANGE UP (ref 3.5–5.3)
RBC # BLD: 4.18 M/UL — SIGNIFICANT CHANGE UP (ref 3.8–5.2)
RBC # FLD: 13.2 % — SIGNIFICANT CHANGE UP (ref 10.3–14.5)
SODIUM SERPL-SCNC: 142 MMOL/L — SIGNIFICANT CHANGE UP (ref 135–145)
WBC # BLD: 4.37 K/UL — SIGNIFICANT CHANGE UP (ref 3.8–10.5)
WBC # FLD AUTO: 4.37 K/UL — SIGNIFICANT CHANGE UP (ref 3.8–10.5)

## 2021-04-30 RX ORDER — SODIUM,POTASSIUM PHOSPHATES 278-250MG
1 POWDER IN PACKET (EA) ORAL
Refills: 0 | Status: DISCONTINUED | OUTPATIENT
Start: 2021-04-30 | End: 2021-05-07

## 2021-04-30 RX ORDER — APIXABAN 2.5 MG/1
2.5 TABLET, FILM COATED ORAL EVERY 12 HOURS
Refills: 0 | Status: DISCONTINUED | OUTPATIENT
Start: 2021-04-30 | End: 2021-04-30

## 2021-04-30 RX ORDER — APIXABAN 2.5 MG/1
10 TABLET, FILM COATED ORAL EVERY 12 HOURS
Refills: 0 | Status: DISCONTINUED | OUTPATIENT
Start: 2021-04-30 | End: 2021-05-07

## 2021-04-30 RX ORDER — LANOLIN ALCOHOL/MO/W.PET/CERES
3 CREAM (GRAM) TOPICAL AT BEDTIME
Refills: 0 | Status: DISCONTINUED | OUTPATIENT
Start: 2021-04-30 | End: 2021-05-07

## 2021-04-30 RX ADMIN — PANTOPRAZOLE SODIUM 40 MILLIGRAM(S): 20 TABLET, DELAYED RELEASE ORAL at 06:02

## 2021-04-30 RX ADMIN — Medication 3 MILLIGRAM(S): at 21:30

## 2021-04-30 RX ADMIN — LORATADINE 10 MILLIGRAM(S): 10 TABLET ORAL at 12:23

## 2021-04-30 RX ADMIN — LOSARTAN POTASSIUM 50 MILLIGRAM(S): 100 TABLET, FILM COATED ORAL at 05:13

## 2021-04-30 RX ADMIN — Medication 1 TABLET(S): at 17:21

## 2021-04-30 RX ADMIN — ENOXAPARIN SODIUM 50 MILLIGRAM(S): 100 INJECTION SUBCUTANEOUS at 06:02

## 2021-04-30 RX ADMIN — Medication 1 TABLET(S): at 12:23

## 2021-04-30 RX ADMIN — APIXABAN 10 MILLIGRAM(S): 2.5 TABLET, FILM COATED ORAL at 17:20

## 2021-04-30 NOTE — PROGRESS NOTE ADULT - SUBJECTIVE AND OBJECTIVE BOX
Patient is a 80y old  Female who presents with a chief complaint of     81 yo female PMH dementia, HTN, asthma controlled, seasonal allergies presents to ED c/o SOB. Onset this AM. Hx obtained from daughter Yanelis Fraire as patient has hx of dementia with significant mental decline for past 1-2 months. Daughter states on Monday AM patient went to neighbor's house stating she was SOB. Neighbor called the ambulance and patient was transferred to [facility] and diagnosed with Covid, daughter does not know how patient got covid as patient is largely home bound with no visitors. Patient was d/c the same day (4/26) with albuterol inhaler and Losartan as pt was hypertensive. This AM, patient had similar episode where she went to [facility] and c/o SOB, so neighbor called ambulance and patient was transferred to [place]. Patient seen and examined at bedside, is a poor historian, A&Ox1 (person). Patient states she was SOB this AM but does not know why she is here. C/o chest tightness but denies cough, wheezing, CP, palpitations, fevers, chills, nausea, abd pain. When asked if patient was in hospital on Monday, she replied "I suppose so." Per daughter, patient with significant mental decline since March. Patient received the Covid vaccine 3/26 and since then has had a progressive decline in mental status. Prior to March patient was A&Ox3 and independent, however since then patient has had increased confusion, found wandering to neighbor's houses. Patient does not have a PCP, has seen Dr. Clements once for pre-op clearance for cataract surgery 1 year ago. Daughter states they have been trying to make an appointment with a neurologist for her cognitive decline but have been unable to do so. Of note, patient has a history of hypotension and has never been on anti-hypertensive medications until Monday. Daughter states she was very surprised that her mother was found to be hypertensive and prescribed Losartan. Patient was given one dose yesterday.     In ED:  Vitals: T 98, HR 63, /76, RR 18, SpO2 100% on RA  Labs significant for: WBC 3.77, D-dimer 672, Cl 112, anion gap 2, AST 12, ALT 11  CTA: + acute bilateral pulmonary emboli, no evidence of R heart strain  CT Head: Small vessel ischemic changes in both hemispheres with volume loss and involutional change. No acute infarct or hemorrhagic lesion noted. Right frontal meningioma measuring approximately 1.1 cm.  EKG: NSR, rate 62, T wave abnormality   Given: 1L NS bolus x1, Albuterol inhaler x1, Lovenox 50mg x1  COVID 19-PCR -NEG    INTERVAL HPI:   4/29/21: Pt admitted overnight for acute b/l PE. Pt seen and examined. Pt A&Ox1, unsure of why she is here. Pleasant, on RA and denies SOB. Has no acute complaints this AM. On lovenox 50 mg daily for PE. CXR no acute pulm disease, US duplex no dvt b/l. F/u TTE.   4/30/21: No acute overnight events. Per RN, pt did not sleep much throughout the night. Confused, tries to get out of bed but is redirectable and pleasant. Pt seen and examined, pt outside by nursing station as she keeps trying to climb out of bed. Pt confused, A&Ox1, but pleasant. She has no acute complaints. She is tolerating room air. Pt switched to eliquis 10 mg bid. TTE with no heart strain. PT recommending BENJAMIN.       OVERNIGHT EVENTS: none     Home Medications:  losartan 50 mg oral tablet: 1 tab(s) orally once a day (28 Apr 2021 19:49)      MEDICATIONS  (STANDING):  apixaban 10 milliGRAM(s) Oral every 12 hours  loratadine 10 milliGRAM(s) Oral daily  losartan 50 milliGRAM(s) Oral daily  melatonin 3 milliGRAM(s) Oral at bedtime  pantoprazole    Tablet 40 milliGRAM(s) Oral before breakfast  potassium phosphate / sodium phosphate Tablet (K-PHOS No. 2) 1 Tablet(s) Oral four times a day    MEDICATIONS  (PRN):  ALBUTerol    90 MICROgram(s) HFA Inhaler 1 Puff(s) Inhalation every 4 hours PRN Shortness of Breath and/or Wheezing      Allergies    No Known Allergies    Intolerances        REVIEW OF SYSTEMS: "I feel fine"  CONSTITUTIONAL: No fever, No chills, No fatigue, No myalgia, No Body ache, No Weakness  EYES: No eye pain,  No visual disturbances, No discharge, NO Redness  ENMT:  No ear pain, No nose bleed, No vertigo; No sinus or throat pain, No Congestion  NECK: No pain, No stiffness  RESPIRATORY: No cough, wheezing, No  hemoptysis, No shortness of breath  CARDIOVASCULAR: No chest pain, palpitations  GASTROINTESTINAL: No abdominal or epigastric pain. No nausea, No vomiting; No diarrhea or constipation. [  ] BM  GENITOURINARY: No dysuria, No frequency, No urgency, No hematuria, or incontinence  NEUROLOGICAL: No headaches, No dizziness, No numbness, No tingling, No tremors, No weakness  EXT: No Swelling, No Pain, No Edema  SKIN:  [ x ] No itching, burning, rashes, or lesions   MUSCULOSKELETAL: No joint pain or swelling; No muscle pain, No back pain, No extremity pain  PSYCHIATRIC: No depression, anxiety, mood swings or difficulty sleeping at night  PAIN SCALE: [x  ] None  [  ] Other-  ROS Unable to obtain due to - [  ] Dementia  [  ] Lethargy  [  ] Sedated [  ] non verbal  REST OF REVIEW Of SYSTEM - [ x ] Normal     Vital Signs Last 24 Hrs  T(C): 36.4 (30 Apr 2021 11:47), Max: 36.4 (29 Apr 2021 19:24)  T(F): 97.6 (30 Apr 2021 11:47), Max: 97.6 (29 Apr 2021 19:24)  HR: 65 (30 Apr 2021 11:47) (60 - 73)  BP: 122/86 (30 Apr 2021 11:47) (109/58 - 144/71)  BP(mean): --  RR: 18 (30 Apr 2021 11:47) (18 - 19)  SpO2: 98% (30 Apr 2021 11:47) (95% - 98%)  Finger Stick        04-29 @ 07:01  -  04-30 @ 07:00  --------------------------------------------------------  IN: 780 mL / OUT: 301 mL / NET: 479 mL        PHYSICAL EXAM:  GENERAL:  [ x ] NAD , [ x ] well appearing, [  ] Agitated, [  ] Drowsy,  [  ] Lethargy, [ x ] confused   HEAD:  [ x ] Normal, [  ] Other  EYES:  [x  ] EOMI, [ x ] PERRLA, [x  ] conjunctiva and sclera clear normal, [  ] Other,  [  ] Pallor,[  ] Discharge  ENMT:  [ x ] Normal, [x  ] Moist mucous membranes, [  ] Good dentition, [ x ] No Thrush  NECK:  [ x ] Supple, [ x ] No JVD, [ x ] Normal thyroid, [  ] Lymphadenopathy [  ] Other  CHEST/LUNG:  [ x ] Clear to auscultation bilaterally, [ x ] Breath Sounds Decrease,  [x  ] No rales, [ x ] No rhonchi  [ x ]  No wheezing  HEART:  [ x ] Regular rate and rhythm, [  ] tachycardia, [  ] Bradycardia,  [  ] irregular  [ x ] No murmurs, No rubs, No gallops, [  ] PPM in place (Mfr:  )  ABDOMEN:  [x  ] Soft, [ x ] Nontender, [ x ] Nondistended, [ x ] No mass, [ x ] Bowel sounds present, [  ] obese  NERVOUS SYSTEM:  [ x ] Alert & Oriented X1, [ x ] Nonfocal  [ x ] Confusion  [  ] Encephalopathic [  ] Sedated [  ] Unable to assess, [  ] Other-  EXTREMITIES: [ x ] 2+ Peripheral Pulses, No clubbing, No cyanosis,  [  ] edema B/L lower EXT. [  ] PVD stasis skin changes B/L Lower EXT  LYMPH: No lymphadenopathy noted  SKIN:  [ x ] No rashes or lesions, [  ] Pressure Ulcers, [  ] ecchymosis, [  ] Skin Tears, [  ] Other    DIET: Diet, Soft (04-28-21 @ 20:31) [Active]    LABS:                        13.0   4.37  )-----------( 218      ( 30 Apr 2021 06:50 )             39.7     30 Apr 2021 06:50    142    |  110    |  13     ----------------------------<  79     4.0     |  28     |  0.67     Ca    9.3        30 Apr 2021 06:50  Phos  2.4       30 Apr 2021 06:50  Mg     2.3       30 Apr 2021 06:50      PT/INR - ( 29 Apr 2021 07:25 )   PT: 13.3 sec;   INR: 1.14 ratio         PTT - ( 29 Apr 2021 07:25 )  PTT:36.8 sec              CARDIAC MARKERS ( 28 Apr 2021 15:07 )  <.015 ng/mL / x     / x     / x     / x                 Anemia Panel:  Vitamin B12, Serum: 427 pg/mL (04-29-21 @ 08:57)  Folate, Serum: 18.4 ng/mL (04-29-21 @ 08:57)      Thyroid Panel:  T4, Serum: 5.7 ug/dL (04-29-21 @ 08:57)  Thyroid Stimulating Hormone, Serum: 1.50 uIU/mL (04-29-21 @ 07:25)            Serum Pro-Brain Natriuretic Peptide: 87 pg/mL (04-28-21 @ 15:07)      RADIOLOGY & ADDITIONAL TESTS:  < from: TTE Echo Complete w/o Contrast w/ Doppler (04.29.21 @ 10:09) >  IMPRESSION:  Normal left ventricular internal dimensions and systolic function, estimated LVEF of 60%.  Grossly normal RV size and systolic function.  Sclerotic trileaflet aortic valve, trace AI.  Mild MR and TR.  No significant pericardial effusion.      < end of copied text >        HEALTH ISSUES - PROBLEM Dx:  Pulmonary embolism, bilateral    Dementia    HTN (hypertension)    Seasonal allergies    Asthma    Need for prophylactic measure            Consultant(s) Notes Reviewed:  [ x ] YES     Care Discussed with [X] Consultants  [x  ] Patient  [ x ] Family  [  ]   [  ] Social Service  [ x ] RN, [  ] Physical Therapy  DVT PPX: [  ] Lovenox, [  ] S C Heparin, [  ] Coumadin, [  ] Xarelto, [x  ] Eliquis, [  ] Pradaxa, [  ] IV Heparin drip, [  ] SCD [  ] Contraindication 2 to GI Bleed,[  ] Ambulation  Advanced directive: [ x ] None, [  ] DNR/DNI Patient is a 80y old  Female who presents with a chief complaint of     79 yo female PMH dementia, HTN, asthma controlled, seasonal allergies presents to ED c/o SOB. Onset this AM. Hx obtained from daughter Yanelis Fraire as patient has hx of dementia with significant mental decline for past 1-2 months. Daughter states on Monday AM patient went to neighbor's house stating she was SOB. Neighbor called the ambulance and patient was transferred to [facility] and diagnosed with Covid, daughter does not know how patient got covid as patient is largely home bound with no visitors. Patient was d/c the same day (4/26) with albuterol inhaler and Losartan as pt was hypertensive. This AM, patient had similar episode where she went to [facility] and c/o SOB, so neighbor called ambulance and patient was transferred to [place]. Patient seen and examined at bedside, is a poor historian, A&Ox1 (person). Patient states she was SOB this AM but does not know why she is here. C/o chest tightness but denies cough, wheezing, CP, palpitations, fevers, chills, nausea, abd pain. When asked if patient was in hospital on Monday, she replied "I suppose so." Per daughter, patient with significant mental decline since March. Patient received the Covid vaccine 3/26 and since then has had a progressive decline in mental status. Prior to March patient was A&Ox3 and independent, however since then patient has had increased confusion, found wandering to neighbor's houses. Patient does not have a PCP, has seen Dr. Clements once for pre-op clearance for cataract surgery 1 year ago. Daughter states they have been trying to make an appointment with a neurologist for her cognitive decline but have been unable to do so. Of note, patient has a history of hypotension and has never been on anti-hypertensive medications until Monday. Daughter states she was very surprised that her mother was found to be hypertensive and prescribed Losartan. Patient was given one dose yesterday.     In ED:  Vitals: T 98, HR 63, /76, RR 18, SpO2 100% on RA  Labs significant for: WBC 3.77, D-dimer 672, Cl 112, anion gap 2, AST 12, ALT 11  CTA: + acute bilateral pulmonary emboli, no evidence of R heart strain  CT Head: Small vessel ischemic changes in both hemispheres with volume loss and involutional change. No acute infarct or hemorrhagic lesion noted. Right frontal meningioma measuring approximately 1.1 cm.  EKG: NSR, rate 62, T wave abnormality   Given: 1L NS bolus x1, Albuterol inhaler x1, Lovenox 50mg x1  COVID 19-PCR -NEG    INTERVAL HPI:   4/29/21: Pt admitted overnight for acute b/l PE. Pt seen and examined. Pt A&Ox1, unsure of why she is here. Pleasant, on RA and denies SOB. Has no acute complaints this AM. On lovenox 50 mg daily for PE. CXR no acute pulm disease, US duplex no dvt b/l. F/u TTE.   4/30/21: No acute overnight events. Per RN, pt did not sleep much throughout the night. Confused, tries to get out of bed but is redirectable and pleasant. Pt seen and examined, pt outside by nursing station as she keeps trying to climb out of bed. Pt confused, A&Ox1, but pleasant. She has no acute complaints. She is tolerating room air. Pt switched to eliquis 10 mg bid. TTE with no heart strain. PT recommending BENJAMIN.       OVERNIGHT EVENTS: none     Home Medications:  losartan 50 mg oral tablet: 1 tab(s) orally once a day (28 Apr 2021 19:49)      MEDICATIONS  (STANDING):  apixaban 10 milliGRAM(s) Oral every 12 hours  loratadine 10 milliGRAM(s) Oral daily  losartan 50 milliGRAM(s) Oral daily  melatonin 3 milliGRAM(s) Oral at bedtime  pantoprazole    Tablet 40 milliGRAM(s) Oral before breakfast  potassium phosphate / sodium phosphate Tablet (K-PHOS No. 2) 1 Tablet(s) Oral four times a day    MEDICATIONS  (PRN):  ALBUTerol    90 MICROgram(s) HFA Inhaler 1 Puff(s) Inhalation every 4 hours PRN Shortness of Breath and/or Wheezing      Allergies    No Known Allergies    Intolerances        REVIEW OF SYSTEMS: "I feel fine"  CONSTITUTIONAL: No fever, No chills, No fatigue, No myalgia, No Body ache, No Weakness  EYES: No eye pain,  No visual disturbances, No discharge, NO Redness  ENMT:  No ear pain, No nose bleed, No vertigo; No sinus or throat pain, No Congestion  NECK: No pain, No stiffness  RESPIRATORY: No cough, wheezing, No  hemoptysis, No shortness of breath  CARDIOVASCULAR: No chest pain, palpitations  GASTROINTESTINAL: No abdominal or epigastric pain. No nausea, No vomiting; No diarrhea or constipation. [  ] BM  GENITOURINARY: No dysuria, No frequency, No urgency, No hematuria, or incontinence  NEUROLOGICAL: No headaches, No dizziness, No numbness, No tingling, No tremors, No weakness  EXT: No Swelling, No Pain, No Edema  SKIN:  [ x ] No itching, burning, rashes, or lesions   MUSCULOSKELETAL: No joint pain or swelling; No muscle pain, No back pain, No extremity pain  PSYCHIATRIC: No depression, anxiety, mood swings or difficulty sleeping at night  PAIN SCALE: [x  ] None  [  ] Other-  ROS Unable to obtain due to - [  ] Dementia  [  ] Lethargy  [  ] Sedated [  ] non verbal  REST OF REVIEW Of SYSTEM - [ x ] Normal     Vital Signs Last 24 Hrs  T(C): 36.4 (30 Apr 2021 11:47), Max: 36.4 (29 Apr 2021 19:24)  T(F): 97.6 (30 Apr 2021 11:47), Max: 97.6 (29 Apr 2021 19:24)  HR: 65 (30 Apr 2021 11:47) (60 - 73)  BP: 122/86 (30 Apr 2021 11:47) (109/58 - 144/71)  BP(mean): --  RR: 18 (30 Apr 2021 11:47) (18 - 19)  SpO2: 98% (30 Apr 2021 11:47) (95% - 98%)  Finger Stick        04-29 @ 07:01  -  04-30 @ 07:00  --------------------------------------------------------  IN: 780 mL / OUT: 301 mL / NET: 479 mL        PHYSICAL EXAM:  GENERAL:  [ x ] NAD , [ x ] well appearing, [  ] Agitated, [  ] Drowsy,  [  ] Lethargy, [ x ] confused   HEAD:  [ x ] Normal, [  ] Other  EYES:  [x  ] EOMI, [ x ] PERRLA, [x  ] conjunctiva and sclera clear normal, [  ] Other,  [  ] Pallor,[  ] Discharge  ENMT:  [ x ] Normal, [x  ] Moist mucous membranes, [  ] Good dentition, [ x ] No Thrush  NECK:  [ x ] Supple, [ x ] No JVD, [ x ] Normal thyroid, [  ] Lymphadenopathy [  ] Other  CHEST/LUNG:  [ x ] Clear to auscultation bilaterally, [ x ] Breath Sounds Decrease,  [x  ] No rales, [ x ] No rhonchi  [ x ]  No wheezing  HEART:  [ x ] Regular rate and rhythm, [  ] tachycardia, [  ] Bradycardia,  [  ] irregular  [ x ] No murmurs, No rubs, No gallops, [  ] PPM in place (Mfr:  )  ABDOMEN:  [x  ] Soft, [ x ] Nontender, [ x ] Nondistended, [ x ] No mass, [ x ] Bowel sounds present, [  ] obese  NERVOUS SYSTEM:  [ x ] Alert & Oriented X1, [ x ] Nonfocal  [ x ] Confusion  [  ] Encephalopathic [  ] Sedated [  ] Unable to assess, [ x ] Other-Dementia  EXTREMITIES: [ x ] 2+ Peripheral Pulses, No clubbing, No cyanosis,  [  ] edema B/L lower EXT. [  ] PVD stasis skin changes B/L Lower EXT  LYMPH: No lymphadenopathy noted  SKIN:  [ x ] No rashes or lesions, [  ] Pressure Ulcers, [  ] ecchymosis, [  ] Skin Tears, [  ] Other    DIET: Diet, Soft (04-28-21 @ 20:31) [Active]    LABS:                        13.0   4.37  )-----------( 218      ( 30 Apr 2021 06:50 )             39.7     30 Apr 2021 06:50    142    |  110    |  13     ----------------------------<  79     4.0     |  28     |  0.67     Ca    9.3        30 Apr 2021 06:50  Phos  2.4       30 Apr 2021 06:50  Mg     2.3       30 Apr 2021 06:50      PT/INR - ( 29 Apr 2021 07:25 )   PT: 13.3 sec;   INR: 1.14 ratio         PTT - ( 29 Apr 2021 07:25 )  PTT:36.8 sec              CARDIAC MARKERS ( 28 Apr 2021 15:07 )  <.015 ng/mL / x     / x     / x     / x                 Anemia Panel:  Vitamin B12, Serum: 427 pg/mL (04-29-21 @ 08:57)  Folate, Serum: 18.4 ng/mL (04-29-21 @ 08:57)      Thyroid Panel:  T4, Serum: 5.7 ug/dL (04-29-21 @ 08:57)  Thyroid Stimulating Hormone, Serum: 1.50 uIU/mL (04-29-21 @ 07:25)    Serum Pro-Brain Natriuretic Peptide: 87 pg/mL (04-28-21 @ 15:07)      RADIOLOGY & ADDITIONAL TESTS:  < from: TTE Echo Complete w/o Contrast w/ Doppler (04.29.21 @ 10:09) >  IMPRESSION:  Normal left ventricular internal dimensions and systolic function, estimated LVEF of 60%.  Grossly normal RV size and systolic function.  Sclerotic trileaflet aortic valve, trace AI.  Mild MR and TR.  No significant pericardial effusion.      < end of copied text >        HEALTH ISSUES - PROBLEM Dx:  Pulmonary embolism, bilateral    Dementia    HTN (hypertension)    Seasonal allergies    Asthma    Need for prophylactic measure      Consultant(s) Notes Reviewed:  [ x ] YES     Care Discussed with [X] Consultants  [x  ] Patient  [ x ] Family- dtr  [  ]   [  ] Social Service  [ x ] RN, [  ] Physical Therapy  DVT PPX: [  ] Lovenox, [  ] S C Heparin, [  ] Coumadin, [  ] Xarelto, [x  ] Eliquis, [  ] Pradaxa, [  ] IV Heparin drip, [  ] SCD [  ] Contraindication 2 to GI Bleed,[  ] Ambulation  Advanced directive: [ x ] None, [  ] DNR/DNI

## 2021-04-30 NOTE — PHYSICAL THERAPY INITIAL EVALUATION ADULT - ADDITIONAL COMMENTS
pt lives in a house w/ stairs.  Per EMR, pt's family visiting daily. pt lives in a house w/ 13 steps.  Per EMR, pt's family visiting daily.

## 2021-04-30 NOTE — DISCHARGE NOTE PROVIDER - CARE PROVIDER_API CALL
LACEY COURTNEY  Internal Medicine  [address] [place] [address]  Phone: [phone]  Fax: [phone]  Follow Up Time:    LACEY COURTNEY  Internal Medicine  [address] [place] [address]  Phone: [phone]  Fax: [phone]  Follow Up Time:     Luis Bah  [facility]  [address], [address]  [place] [address]  Phone: [phone]  Fax: [phone]  Follow Up Time:     Kateryna Corea  NEUROLOGY  [address] [place] [address]  Phone: [phone]  Fax: [phone]  Follow Up Time:

## 2021-04-30 NOTE — PHYSICAL THERAPY INITIAL EVALUATION ADULT - PERTINENT HX OF CURRENT PROBLEM, REHAB EVAL
79 yo F presents to ED c/o SOB. Pt has dementia with significant mental decline for past 1-2 months. Pt went to neighbor's house and c/o SOB, so neighbor called ambulance, pt transferred to [place]. Also c/o chest tightness. Pt received Covid vaccine  3/26. Pt found wandering to neighbor's houses. CTA: + acute bilateral PE, no evidence of R heart strain CT Head: No acute infarct or hemorrhagic lesion noted. Right frontal meningioma measuring approximately 1.1 cm.COVID 19-PCR -NEG. US -DVT BLE

## 2021-04-30 NOTE — GOALS OF CARE CONVERSATION - ADVANCED CARE PLANNING - CONVERSATION DETAILS
Writer spoke  with son/surrogate Margarito Bennett. Reviewed patient's medical and social history as well as events leading to patient's hospitalization. Writer discussed patient's current diagnosis ( Worsening Dementia,PE,HTN),  medical condition and management, . prognosis, and life expectancy. Inquired about patient's wishes regarding extent of medical care to be provided including escalation of medical care into the ICU and use of vasopressor support. In addition, the writer inquired about thoughts regarding cardiopulmnary resuscitation, artificial nutrition and hydration including use of feeding tubes and IVF, antibiotics, and further investigative studies such as blood draws and radiology. Margarito   showed good  insight into medical condition. All questions answered. Writer recommended MOLST form.Margarito consented to DNR/DNI  status. MOLST form filled out and placed in chart. ) Psychosocial support provided.
Writer contacted pt daughterYanelis on phone.  pt with confusion. Reviewed patient's medical and social history as well as events leading to patient's hospitalization. Writer discussed patient's current diagnosis ( silviano pulmonary emboli, SOB, worsening dementia vs infection. HTN, on meds),   Patients medical condition and management,  Inquired about patient's wishes regarding extent of medical care to be provided including escalation of medical care into the ICU and use of vasopressor support. In addition, the writer inquired about thoughts regarding cardiopulmonary resuscitation, artificial nutrition and hydration including use of feeding tubes and IVF, antibiotics,  Daughter showed fair  insight into pt  medical condition. All questions answered.  contact # for physicians given to daughter, Writer inquired of pt hcp, unsure if exists, there is a POA, deferred to pt son, Gloria regarding HCPMonique Hilario, spoke of events leading to hospitalization: pt able to  live alone, daily the family brings and places groceries, was A&O x3, independent.  Writer recommended discussing AD , resuscitation wishes with her brother, make medical decisions as surrogates.  Psychosocial support provided. PC RN left message for Gloria regarding if any hcp with POA, await return call.  Goal for pt, to treat, and will need assist of CM regarding plan for pt, home or long term care.  PC RN contact # given, will follow.  At present pt is a full code.

## 2021-04-30 NOTE — PROGRESS NOTE ADULT - SUBJECTIVE AND OBJECTIVE BOX
Neurology Follow up note    ANIRUDH RICHARDSON80yFemale    HPI:  79 yo female PMH dementia, HTN, asthma controlled, seasonal allergies presents to ED c/o SOB. Onset this AM. Hx obtained from daughter Yanelis Fraire as patient has hx of dementia with significant mental decline for past 1-2 months. Daughter states on Monday AM patient went to neighbors house stating she was SOB. Neighbor called the ambulance and patient was transferred to [facility] and diagnosed with Covid,+ Rxed  with Regen, was observation  daughter does not know how patient got covid as patient is largely home bound with no visitors. Patient was d/c the same day (4/26) with albuterol inhaler and Losartan as pt was hypertensive. This AM, patient had similar episode where she went to [facility] and c/o SOB, so neighbor called ambulance and patient was transferred to [place]. Patient seen and examined at bedside, is a poor historian, A&Ox1 (person). Patient states she was SOB this AM but does not know why she is here. C/o chest tightness but denies cough, wheezing, CP, palpitations, fevers, chills, nausea, abd pain. When asked if patient was in hospital on Monday, she replied "I suppose so." Per daughter, patient with significant mental decline since March. Patient received the Pfizer  Covid vaccine  3/26   and since then has had a progressive decline in mental status 2nd dose Vaccine was given 4/16 , Prior to March patient was A&Ox3 and independent, however since then patient has had increased confusion, found wandering to [facility]. Patient does not have a PCP, has seen Dr. Clements once for pre-op clearance for cataract surgery 1 year ago. Daughter states they have been trying to make an appointment with a neurologist for her cognitive decline but have been unable to do so. Of note, patient has a history of hypotension and has never been on anti-hypertensive medications until Monday. Daughter states she was very surprised that her mother was found to be hypertensive and prescribed Losartan. Patient was given one dose yesterday.     In ED:  Vitals: T 98, HR 63, /76, RR 18, SpO2 100% on RA  Labs significant for: WBC 3.77, D-dimer 672, Cl 112, anion gap 2, AST 12, ALT 11  CTA: + acute bilateral pulmonary emboli, no evidence of R heart strain  CT Head: Small vessel ischemic changes in both hemispheres with volume loss and involutional change. No acute infarct or hemorrhagic lesion noted. Right frontal meningioma measuring approximately 1.1 cm.  EKG: NSR, rate 62, T wave abnormality   Given: 1L NS bolus x1, Albuterol inhaler x1, Lovenox 50mg x1  COVID 19-PCR -NEG       (28 Apr 2021 19:49)      Interval History - confused    Patient is seen, chart was reviewed and case was discussed with the treatment team.  Pt is not in any distress.   Lying on bed comfortably.     Vital Signs Last 24 Hrs  T(C): 36.4 (30 Apr 2021 04:46), Max: 36.4 (29 Apr 2021 12:46)  T(F): 97.6 (30 Apr 2021 04:46), Max: 97.6 (29 Apr 2021 12:46)  HR: 73 (30 Apr 2021 10:21) (60 - 75)  BP: 109/58 (30 Apr 2021 10:21) (109/58 - 158/81)  BP(mean): --  RR: 18 (30 Apr 2021 04:46) (18 - 19)  SpO2: 95% (30 Apr 2021 10:21) (95% - 97%)        REVIEW OF SYSTEMS:    Constitutional: No fever, w  Eyes: No eye pain, visual disturbances, or discharge  ENT:  No difficulty hearing, tinnitus, vertigo; No sinus or throat pain  Neck: No pain or stiffness  Respiratory: No , wheezing, chills or hemoptysis  Cardiovascular: No chest pain, palpitations, s  Gastrointestinal: No abdominal or epigastric pain. No nausea, vomiting or hematemesis;   Genitourinary: No dysuria, frequency, hematuria or incontinence  Neurological: No headaches,   Psychiatric: No d, mood swings or difficulty sleeping  Musculoskeletal: No joint pain or swelling; No muscle, back or extremity pain  Skin: No itching, burning, rashes or lesions   Lymph Nodes: No enlarged glands  Endocrine: No heat or cold intolerance;  Allergy and Immunologic: No hives or eczema    On Neurological Examination:    Mental Status - Pt is alert, awake, oriented X1  . Follows commands well and able to answer questions appropriately    Speech -  Normal.    Cranial Nerves - Pupils 3 mm equal and reactive to light, extraocular eye movements intact. Pt has no visual field deficit.  Pt has no facial asymmetry. Facial sensation is intact.Tongue - is in midline.    Muscle tone - is normal     Motor Exam - 4+/5 all over,   No drift. No shaking or tremors.    Sensory Exam . Pt withdraws all extremities equally on stimulation. No asymmetry seen. No complaints of tingling, numbness.        coordination:    Finger to nose: normal    Heel to shin: normal    Deep tendon Reflexes - 2 plus all over.        Romberg - Negative.    Neck Supple -  Yes.     MEDICATIONS    ALBUTerol    90 MICROgram(s) HFA Inhaler 1 Puff(s) Inhalation every 4 hours PRN  apixaban 10 milliGRAM(s) Oral every 12 hours  loratadine 10 milliGRAM(s) Oral daily  losartan 50 milliGRAM(s) Oral daily  melatonin 3 milliGRAM(s) Oral at bedtime  pantoprazole    Tablet 40 milliGRAM(s) Oral before breakfast  potassium phosphate / sodium phosphate Tablet (K-PHOS No. 2) 1 Tablet(s) Oral four times a day      Allergies    No Known Allergies    Intolerances        LABS:  CBC Full  -  ( 30 Apr 2021 06:50 )  WBC Count : 4.37 K/uL  RBC Count : 4.18 M/uL  Hemoglobin : 13.0 g/dL  Hematocrit : 39.7 %  Platelet Count - Automated : 218 K/uL  Mean Cell Volume : 95.0 fl  Mean Cell Hemoglobin : 31.1 pg  Mean Cell Hemoglobin Concentration : 32.7 gm/dL  Auto Neutrophil # : x  Auto Lymphocyte # : x  Auto Monocyte # : x  Auto      04-30    142  |  110<H>  |  13  ----------------------------<  79  4.0   |  28  |  0.67    Ca    9.3      30 Apr 2021 06:50  Phos  2.4     04-30  Mg     2.3     04-30    TPro  6.2  /  Alb  3.4  /  TBili  1.2  /  DBili  x   /  AST  11<L>  /  ALT  10<L>  /  AlkPhos  62  04-29    Hemoglobin A1C:     Vitamin B12     RADIOLOGY    ASSESSMENT AND PLAN:      seen for ams-  metabolic encephalopathy  dementiaPE    dementia work up in progress  on   Physical therapy evaluation.  OOB to chair/ambulation with assistance only.  Advanced care planning was discussed with family.  Pain is accessed and addressed.

## 2021-04-30 NOTE — DISCHARGE NOTE PROVIDER - NSDCCPCAREPLAN_GEN_ALL_CORE_FT
PRINCIPAL DISCHARGE DIAGNOSIS  Diagnosis: Pulmonary embolism, bilateral  Assessment and Plan of Treatment: You presented with shortness of breath and were found on medical imaging to have bilateral blood clots in the lungs. You were started on blood thinners.  - START eliquis 10 mg two times a day      SECONDARY DISCHARGE DIAGNOSES  Diagnosis: Dementia  Assessment and Plan of Treatment: - Follow up with your primary care physician for further management    Diagnosis: Asthma  Assessment and Plan of Treatment: - Continue home albuterol    Diagnosis: HTN (hypertension)  Assessment and Plan of Treatment: - Continue losartan 100 mg daily     PRINCIPAL DISCHARGE DIAGNOSIS  Diagnosis: Pulmonary embolism, bilateral  Assessment and Plan of Treatment: You presented with shortness of breath and were found on medical imaging to have bilateral blood clots in the lungs. You were started on blood thinners.  - START eliquis 10 mg two times a day until 5/7/21 at 6AM, THEN start eliquis 5 mg two times a day  - Follow up with your primary care physician within 1 week of discharge.      SECONDARY DISCHARGE DIAGNOSES  Diagnosis: HTN (hypertension)  Assessment and Plan of Treatment: - Continue losartan 100 mg daily    Diagnosis: Asthma  Assessment and Plan of Treatment: - Continue home albuterol    Diagnosis: Dementia  Assessment and Plan of Treatment: - Follow up with your primary care physician for further management     PRINCIPAL DISCHARGE DIAGNOSIS  Diagnosis: Pulmonary embolism, bilateral  Assessment and Plan of Treatment: You presented with shortness of breath and were found on medical imaging to have bilateral blood clots in the lungs. You were started on blood thinners.  - START eliquis 10 mg tonight and tomorrow morning. THEN tomorrow evening START eliquis 5 mg two times per day  - Follow up with your primary care physician and hematology within 1 week of discharge.  - You will need to take eliquis for 3-6 months for your blood clot. It is very important to follow up with an outpatient provider for continuation of eliquis and monitoring.      SECONDARY DISCHARGE DIAGNOSES  Diagnosis: HTN (hypertension)  Assessment and Plan of Treatment: Chronic, stable  - Continue losartan 100 mg daily    Diagnosis: Asthma  Assessment and Plan of Treatment: Chronic, stable  - Continue home albuterol as needed    Diagnosis: Dementia  Assessment and Plan of Treatment: Follow up with your primary care physician and neurology for further management and workup.     PRINCIPAL DISCHARGE DIAGNOSIS  Diagnosis: Pulmonary embolism, bilateral  Assessment and Plan of Treatment: You presented with shortness of breath and were found on medical imaging to have bilateral blood clots in the lungs. You were started on blood thinners.  - START eliquis 5 mg two times per day  - Follow up with your primary care physician and hematology within 1 week of discharge.  - You will need to take eliquis for 3-6 months for your blood clot. It is very important to follow up with an outpatient provider for continuation of eliquis and monitoring.      SECONDARY DISCHARGE DIAGNOSES  Diagnosis: HTN (hypertension)  Assessment and Plan of Treatment: Chronic, stable  - Continue losartan 100 mg daily    Diagnosis: Asthma  Assessment and Plan of Treatment: Chronic, stable  - Continue home albuterol as needed    Diagnosis: Dementia  Assessment and Plan of Treatment: Follow up with your primary care physician and neurology for further management and workup.     PRINCIPAL DISCHARGE DIAGNOSIS  Diagnosis: Pulmonary embolism, bilateral  Assessment and Plan of Treatment: You presented with shortness of breath and were found on CT Angiogram Chest imaging to have bilateral blood clots in the lungs Likely 2/2 your recent  COVID infection. You were started on blood thinners.  -CONTINUE  Eliquis 5 mg 1 tab  two times per day, You will need to take this medication for 3-6 months as per Hematology recommendation , Please renew this medication with hematologist every month.  - Follow up with your primary care physician and hematology within 2 week of discharge.  -DO NOT Fall or Cut your self as you are on Blood thinner  - You will need to take eliquis for 3-6 months for your blood clot. It is very important to follow up with an outpatient provider for continuation of eliquis and monitoring.      SECONDARY DISCHARGE DIAGNOSES  Diagnosis: HTN (hypertension)  Assessment and Plan of Treatment: Chronic, stable  - Continue losartan 100 mg daily    Diagnosis: Asthma  Assessment and Plan of Treatment: Chronic, stable  - Continue home albuterol as needed    Diagnosis: Dementia  Assessment and Plan of Treatment: Follow up with your primary care physician and neurology for further management and workup.  -You can follow up with Neurologist DR Corea in 3-4 weeks as out pt    Diagnosis: COVID-19 virus detected  Assessment and Plan of Treatment: You were recently Diagnosed & Treated for COVID 19 Viral Infection 4/26/21 at [facility]   -You Have beed Voccinted with 2 doses of COVID 19 Vaccination .  -You DID Not require any further treatment while at hospital as No symptoms .      Diagnosis: Goals of care, counseling/discussion  Assessment and Plan of Treatment: You were seen by Palliative care Team   You have MOLST form with DNR/DNI     PRINCIPAL DISCHARGE DIAGNOSIS  Diagnosis: Pulmonary embolism, bilateral  Assessment and Plan of Treatment: You presented with shortness of breath and were found on CT Angiogram Chest imaging to have bilateral blood clots in the lungs Likely 2/2 your recent  COVID infection. You were started on blood thinners.  -CONTINUE  Eliquis 5 mg 1 tab  two times per day, You will need to take this medication for 3-6 months as per Hematology recommendation , Please renew this medication with hematologist every month.  - Follow up with your primary care physician and hematology within 2 week of discharge.  -DO NOT Fall or Cut your self as you are on Blood thinner  - You will need to take eliquis for 3-6 months for your blood clot. It is very important to follow up with an outpatient provider for continuation of eliquis and monitoring.      SECONDARY DISCHARGE DIAGNOSES  Diagnosis: HTN (hypertension)  Assessment and Plan of Treatment: Chronic, stable  - Continue losartan 50 mg 1 tab  daily    Diagnosis: Asthma  Assessment and Plan of Treatment: Chronic, stable  - Continue home albuterol as needed    Diagnosis: Dementia  Assessment and Plan of Treatment: Follow up with your primary care physician and neurology for further management and workup.  -You can follow up with Neurologist DR Corea in 3-4 weeks as out pt    Diagnosis: COVID-19 virus detected  Assessment and Plan of Treatment: You were recently Diagnosed & Treated for COVID 19 Viral Infection 4/26/21 at [facility]   -You Have beed Voccinted with 2 doses of COVID 19 Vaccination .  -You DID Not require any further treatment while at hospital as No symptoms .      Diagnosis: Goals of care, counseling/discussion  Assessment and Plan of Treatment: You were seen by Palliative care Team   You have MOLST form with DNR/DNI

## 2021-04-30 NOTE — PROGRESS NOTE ADULT - NSICDXPROBLEMPLAN2_GEN_ALL_CORE_FT
-Per daughter, patient with increased confusion past 1-2 months  -CT head +Small vessel ischemic changes in both hemispheres with volume loss and involutional change. No acute infarct or hemorrhagic lesion noted. Right frontal meningioma measuring approximately 1.1 cm.  -A&Ox1 at baseline on admission  -Likely 2/2 worsening dementia  -COVID PCR neg, RVP neg  -Thyroid panel, B12, folate all wnl  -Fall precautions, aspiration precautions  -PT consulted, recommend BENJAMIN  -Neuro consulted Dr. Corea, recs appreciated   -Palliative Santhosh consulted for GO discussion  -SW/CM consult

## 2021-04-30 NOTE — DISCHARGE NOTE PROVIDER - HOSPITAL COURSE
FROM ADMISSION H+P:   HPI:  79 yo female PMH dementia, HTN, asthma controlled, seasonal allergies presents to ED c/o SOB. Onset this AM. Hx obtained from daughter Yanelis Fraire as patient has hx of dementia with significant mental decline for past 1-2 months. Daughter states on Monday AM patient went to neighbor's house stating she was SOB. Neighbor called the ambulance and patient was transferred to [facility] and diagnosed with Covid,+ Rxed  with Regen, was observation  daughter does not know how patient got covid as patient is largely home bound with no visitors. Patient was d/c the same day (4/26) with albuterol inhaler and Losartan as pt was hypertensive. This AM, patient had similar episode where she went to neighbor's house and c/o SOB, so neighbor called ambulance and patient was transferred to [place]. Patient seen and examined at bedside, is a poor historian, A&Ox1 (person). Patient states she was SOB this AM but does not know why she is here. C/o chest tightness but denies cough, wheezing, CP, palpitations, fevers, chills, nausea, abd pain. When asked if patient was in hospital on Monday, she replied "I suppose so." Per daughter, patient with significant mental decline since March. Patient received the Pfizer  Covid vaccine  3/26   and since then has had a progressive decline in mental status 2nd dose Vaccine was given 4/16 , Prior to March patient was A&Ox3 and independent, however since then patient has had increased confusion, found wandering to [facility]. Patient does not have a PCP, has seen Dr. Clements once for pre-op clearance for cataract surgery 1 year ago. Daughter states they have been trying to make an appointment with a neurologist for her cognitive decline but have been unable to do so. Of note, patient has a history of hypotension and has never been on anti-hypertensive medications until Monday. Daughter states she was very surprised that her mother was found to be hypertensive and prescribed Losartan. Patient was given one dose yesterday.     In ED:  Vitals: T 98, HR 63, /76, RR 18, SpO2 100% on RA  Labs significant for: WBC 3.77, D-dimer 672, Cl 112, anion gap 2, AST 12, ALT 11  CTA: + acute bilateral pulmonary emboli, no evidence of R heart strain  CT Head: Small vessel ischemic changes in both hemispheres with volume loss and involutional change. No acute infarct or hemorrhagic lesion noted. Right frontal meningioma measuring approximately 1.1 cm.  EKG: NSR, rate 62, T wave abnormality   Given: 1L NS bolus x1, Albuterol inhaler x1, Lovenox 50mg x1  COVID 19-PCR -NEG       (28 Apr 2021 19:49)      ---  HOSPITAL COURSE:         PT consulted and recommended _____.     Patient showed improvement throughout hospitalization. Patient was seen and examined on day of discharge. Patient was medically optimized for discharge _____, with close outpatient follow up.  ---  CONSULTANTS:     ---  TIME SPENT:  I, the attending physician, was physically present for the key portions of the evaluation and management (E/M) service provided. The total amount of time spent reviewing the hospital notes, laboratory values, imaging findings, assessing/counseling the patient, discussing with consultant physicians, social work, nursing staff was -- minutes    ---  Primary care provider was made aware of plan for discharge:      [  ] NO     [  ] YES   FROM ADMISSION H+P:   HPI:  81 yo female PMH dementia, HTN, asthma controlled, seasonal allergies presents to ED c/o SOB. Onset this AM. Hx obtained from daughter Yanelis Fraire as patient has hx of dementia with significant mental decline for past 1-2 months. Daughter states on Monday AM patient went to neighbor's house stating she was SOB. Neighbor called the ambulance and patient was transferred to [facility] and diagnosed with Covid,+ Rxed  with Regen, was observation  daughter does not know how patient got covid as patient is largely home bound with no visitors. Patient was d/c the same day (4/26) with albuterol inhaler and Losartan as pt was hypertensive. This AM, patient had similar episode where she went to neighbor's house and c/o SOB, so neighbor called ambulance and patient was transferred to [place]. Patient seen and examined at bedside, is a poor historian, A&Ox1 (person). Patient states she was SOB this AM but does not know why she is here. C/o chest tightness but denies cough, wheezing, CP, palpitations, fevers, chills, nausea, abd pain. When asked if patient was in hospital on Monday, she replied "I suppose so." Per daughter, patient with significant mental decline since March. Patient received the Pfizer  Covid vaccine  3/26   and since then has had a progressive decline in mental status 2nd dose Vaccine was given 4/16 , Prior to March patient was A&Ox3 and independent, however since then patient has had increased confusion, found wandering to [facility]. Patient does not have a PCP, has seen Dr. Clements once for pre-op clearance for cataract surgery 1 year ago. Daughter states they have been trying to make an appointment with a neurologist for her cognitive decline but have been unable to do so. Of note, patient has a history of hypotension and has never been on anti-hypertensive medications until Monday. Daughter states she was very surprised that her mother was found to be hypertensive and prescribed Losartan. Patient was given one dose yesterday.     In ED:  Vitals: T 98, HR 63, /76, RR 18, SpO2 100% on RA  Labs significant for: WBC 3.77, D-dimer 672, Cl 112, anion gap 2, AST 12, ALT 11  CTA: + acute bilateral pulmonary emboli, no evidence of R heart strain  CT Head: Small vessel ischemic changes in both hemispheres with volume loss and involutional change. No acute infarct or hemorrhagic lesion noted. Right frontal meningioma measuring approximately 1.1 cm.  EKG: NSR, rate 62, T wave abnormality   Given: 1L NS bolus x1, Albuterol inhaler x1, Lovenox 50mg x1  COVID 19-PCR -NEG       (28 Apr 2021 19:49)      ---  HOSPITAL COURSE: The patient was admitted for bilateral pulmonary embolism on CT scan. Patient was started on lovenox 50 mg BID and transitioned to Eliquis 10 mg BID. Patient was breathing comfortably on room air and did not require oxygen therapy. Although patient COVID-19 PCR negative x2 in ED, patient with positive result 3 days prior to admission at [facility] in which she received antibody infusion per chart review. ID was consulted, patient first symptomatic 10 days prior to admission per family, likely outside of window to be contagious however PE likely secondary to previous COVID-19 infection. Neurology consulted for altered mental status, stated likely secondary to progressing dementia. Psychiatry consulted for decision making capacity, patient deemed to have no decision making capacity.         PT consulted and recommended _____.     Patient showed improvement throughout hospitalization. Patient was seen and examined on day of discharge. Patient was medically optimized for discharge _____, with close outpatient follow up.  ---  CONSULTANTS:     ---  TIME SPENT:  I, the attending physician, was physically present for the key portions of the evaluation and management (E/M) service provided. The total amount of time spent reviewing the hospital notes, laboratory values, imaging findings, assessing/counseling the patient, discussing with consultant physicians, social work, nursing staff was -- minutes    ---  Primary care provider was made aware of plan for discharge:      [  ] NO     [  ] YES   FROM ADMISSION H+P:   HPI:  79 yo female PMH dementia, HTN, asthma controlled, seasonal allergies presents to ED c/o SOB. Onset this AM. Hx obtained from daughter Yanelis Fraire as patient has hx of dementia with significant mental decline for past 1-2 months. Daughter states on Monday AM patient went to neighbor's house stating she was SOB. Neighbor called the ambulance and patient was transferred to [facility] and diagnosed with Covid,+ Rxed  with Regen, was observation  daughter does not know how patient got covid as patient is largely home bound with no visitors. Patient was d/c the same day (4/26) with albuterol inhaler and Losartan as pt was hypertensive. This AM, patient had similar episode where she went to neighbor's house and c/o SOB, so neighbor called ambulance and patient was transferred to [place]. Patient seen and examined at bedside, is a poor historian, A&Ox1 (person). Patient states she was SOB this AM but does not know why she is here. C/o chest tightness but denies cough, wheezing, CP, palpitations, fevers, chills, nausea, abd pain. When asked if patient was in hospital on Monday, she replied "I suppose so." Per daughter, patient with significant mental decline since March. Patient received the Pfizer  Covid vaccine  3/26   and since then has had a progressive decline in mental status 2nd dose Vaccine was given 4/16 , Prior to March patient was A&Ox3 and independent, however since then patient has had increased confusion, found wandering to [facility]. Patient does not have a PCP, has seen Dr. Clements once for pre-op clearance for cataract surgery 1 year ago. Daughter states they have been trying to make an appointment with a neurologist for her cognitive decline but have been unable to do so. Of note, patient has a history of hypotension and has never been on anti-hypertensive medications until Monday. Daughter states she was very surprised that her mother was found to be hypertensive and prescribed Losartan. Patient was given one dose yesterday.     In ED:  Vitals: T 98, HR 63, /76, RR 18, SpO2 100% on RA  Labs significant for: WBC 3.77, D-dimer 672, Cl 112, anion gap 2, AST 12, ALT 11  CTA: + acute bilateral pulmonary emboli, no evidence of R heart strain  CT Head: Small vessel ischemic changes in both hemispheres with volume loss and involutional change. No acute infarct or hemorrhagic lesion noted. Right frontal meningioma measuring approximately 1.1 cm.  EKG: NSR, rate 62, T wave abnormality   Given: 1L NS bolus x1, Albuterol inhaler x1, Lovenox 50mg x1  COVID 19-PCR -NEG       (28 Apr 2021 19:49)      ---  HOSPITAL COURSE: The patient was admitted for bilateral pulmonary embolism on CT scan. Patient was started on lovenox 50 mg BID and transitioned to Eliquis 10 mg BID. Patient was breathing comfortably on room air and did not require oxygen therapy. TTE showed no heart strain. Although patient COVID-19 PCR negative x2 in ED, patient with positive result 3 days prior to admission at [facility] in which she received antibody infusion per chart review. ID was consulted, patient first symptomatic 10 days prior to admission per family, likely outside of window to be contagious however PE likely secondary to previous COVID-19 infection. Neurology consulted for altered mental status, stated likely secondary to progressing dementia. Psychiatry consulted for decision making capacity, patient deemed to have no decision making capacity. Palliative care consulted for goals of care discussion with family, patient made DNR/DNI.    PT consulted and recommended [facility].     Patient showed improvement throughout hospitalization. Patient was seen and examined on day of discharge. Patient was medically optimized for discharge for [facility], with close outpatient follow up.  ---  CONSULTANTS:   Pulmonology - Dr. Spangler  Heme/Onc - Dr. Bah  ID - Dr. Yen  Palliative Care - Dr. Lizandro Justin  Psychiatry - Dr. Cevallos  Neurology - Dr. Corea     ---  TIME SPENT:  I, the attending physician, was physically present for the key portions of the evaluation and management (E/M) service provided. The total amount of time spent reviewing the hospital notes, laboratory values, imaging findings, assessing/counseling the patient, discussing with consultant physicians, social work, nursing staff was -- minutes    ---  Primary care provider was made aware of plan for discharge:      [  ] NO     [  ] YES   FROM ADMISSION H+P:   HPI:  81 yo female PMH dementia, HTN, asthma controlled, seasonal allergies presents to ED c/o SOB. Onset this AM. Hx obtained from daughter Yanelis Fraire as patient has hx of dementia with significant mental decline for past 1-2 months. Daughter states on Monday AM patient went to neighbor's house stating she was SOB. Neighbor called the ambulance and patient was transferred to [facility] and diagnosed with Covid,+ Rxed  with Regen, was observation  daughter does not know how patient got covid as patient is largely home bound with no visitors. Patient was d/c the same day (4/26) with albuterol inhaler and Losartan as pt was hypertensive. This AM, patient had similar episode where she went to neighbor's house and c/o SOB, so neighbor called ambulance and patient was transferred to [place]. Patient seen and examined at bedside, is a poor historian, A&Ox1 (person). Patient states she was SOB this AM but does not know why she is here. C/o chest tightness but denies cough, wheezing, CP, palpitations, fevers, chills, nausea, abd pain. When asked if patient was in hospital on Monday, she replied "I suppose so." Per daughter, patient with significant mental decline since March. Patient received the Pfizer  Covid vaccine  3/26   and since then has had a progressive decline in mental status 2nd dose Vaccine was given 4/16 , Prior to March patient was A&Ox3 and independent, however since then patient has had increased confusion, found wandering to [facility]. Patient does not have a PCP, has seen Dr. Clements once for pre-op clearance for cataract surgery 1 year ago. Daughter states they have been trying to make an appointment with a neurologist for her cognitive decline but have been unable to do so. Of note, patient has a history of hypotension and has never been on anti-hypertensive medications until Monday. Daughter states she was very surprised that her mother was found to be hypertensive and prescribed Losartan. Patient was given one dose yesterday.     In ED:  Vitals: T 98, HR 63, /76, RR 18, SpO2 100% on RA  Labs significant for: WBC 3.77, D-dimer 672, Cl 112, anion gap 2, AST 12, ALT 11  CTA: + acute bilateral pulmonary emboli, no evidence of R heart strain  CT Head: Small vessel ischemic changes in both hemispheres with volume loss and involutional change. No acute infarct or hemorrhagic lesion noted. Right frontal meningioma measuring approximately 1.1 cm.  EKG: NSR, rate 62, T wave abnormality   Given: 1L NS bolus x1, Albuterol inhaler x1, Lovenox 50mg x1  COVID 19-PCR -NEG       (28 Apr 2021 19:49)      ---  HOSPITAL COURSE: The patient was admitted for bilateral pulmonary embolism on CT scan. Patient was started on lovenox 50 mg BID and transitioned to Eliquis 10 mg BID. Patient was breathing comfortably on room air and did not require oxygen therapy. TTE showed no heart strain. Although patient COVID-19 PCR negative x2 in ED, patient with positive result 3 days prior to admission at [facility] in which she received antibody infusion per chart review. ID was consulted, patient first symptomatic 10 days prior to admission per family, likely outside of window to be contagious however PE likely secondary to previous COVID-19 infection. Neurology consulted for altered mental status, stated likely secondary to progressing dementia. Psychiatry consulted for decision making capacity, patient deemed to have no decision making capacity. Palliative care consulted for goals of care, patient made DNR/DNI with discussion with family. PT consulted and recommended [facility]. COVID-19 PCR prior to discharge to [facility] positive, per ID patient did not need to isolate as patient was day 14 at time of PCR and was no longer contagious .     Patient showed improvement throughout hospitalization. Patient was seen and examined on day of discharge. Patient was medically optimized for discharge to [facility], with close outpatient follow up.  ---  CONSULTANTS:   Pulmonology - Dr. Spangler  ID - Dr. Yen  Heme/onc - Dr. Bah  Neurology - Dr. Corea   Palliative Care - Dr. Lizandro Justin     ---  TIME SPENT:  I, the attending physician, was physically present for the key portions of the evaluation and management (E/M) service provided. The total amount of time spent reviewing the hospital notes, laboratory values, imaging findings, assessing/counseling the patient, discussing with consultant physicians, social work, nursing staff was -- minutes    ---  Primary care provider was made aware of plan for discharge:      [  ] NO     [  ] YES   FROM ADMISSION H+P:   HPI:  81 yo female PMH dementia, HTN, asthma controlled, seasonal allergies presents to ED c/o SOB. Onset this AM. Hx obtained from daughter Yanelis Fraire as patient has hx of dementia with significant mental decline for past 1-2 months. Daughter states on Monday AM patient went to neighbor's house stating she was SOB. Neighbor called the ambulance and patient was transferred to [facility] and diagnosed with Covid,+ Rxed  with Regen, was observation  daughter does not know how patient got covid as patient is largely home bound with no visitors. Patient was d/c the same day (4/26) with albuterol inhaler and Losartan as pt was hypertensive. This AM, patient had similar episode where she went to neighbor's house and c/o SOB, so neighbor called ambulance and patient was transferred to [place]. Patient seen and examined at bedside, is a poor historian, A&Ox1 (person). Patient states she was SOB this AM but does not know why she is here. C/o chest tightness but denies cough, wheezing, CP, palpitations, fevers, chills, nausea, abd pain. When asked if patient was in hospital on Monday, she replied "I suppose so." Per daughter, patient with significant mental decline since March. Patient received the Pfizer  Covid vaccine  3/26   and since then has had a progressive decline in mental status 2nd dose Vaccine was given 4/16 , Prior to March patient was A&Ox3 and independent, however since then patient has had increased confusion, found wandering to [facility]. Patient does not have a PCP, has seen Dr. Clements once for pre-op clearance for cataract surgery 1 year ago. Daughter states they have been trying to make an appointment with a neurologist for her cognitive decline but have been unable to do so. Of note, patient has a history of hypotension and has never been on anti-hypertensive medications until Monday. Daughter states she was very surprised that her mother was found to be hypertensive and prescribed Losartan. Patient was given one dose yesterday.     In ED:  Vitals: T 98, HR 63, /76, RR 18, SpO2 100% on RA  Labs significant for: WBC 3.77, D-dimer 672, Cl 112, anion gap 2, AST 12, ALT 11  CTA: + acute bilateral pulmonary emboli, no evidence of R heart strain  CT Head: Small vessel ischemic changes in both hemispheres with volume loss and involutional change. No acute infarct or hemorrhagic lesion noted. Right frontal meningioma measuring approximately 1.1 cm.  EKG: NSR, rate 62, T wave abnormality   Given: 1L NS bolus x1, Albuterol inhaler x1, Lovenox 50mg x1  COVID 19-PCR -NEG       (28 Apr 2021 19:49)      ---  HOSPITAL COURSE: The patient was admitted for bilateral pulmonary embolism on CT scan. Patient was started on lovenox 50 mg BID and transitioned to Eliquis 10 mg BID. Patient was breathing comfortably on room air and did not require oxygen therapy. TTE showed no heart strain. Although patient COVID-19 PCR negative x2 in ED, patient with positive result 3 days prior to admission at [facility] in which she received antibody infusion per chart review. ID was consulted, patient first symptomatic 10 days prior to admission per family, likely outside of window to be contagious however PE likely secondary to previous COVID-19 infection. Neurology consulted for altered mental status, stated likely secondary to progressing dementia. Psychiatry consulted for decision making capacity, patient deemed to have no decision making capacity. Palliative care consulted for goals of care, patient made DNR/DNI with discussion with family. PT consulted and recommended [facility]. COVID-19 PCR prior to discharge to [facility] positive, per ID patient did not need to isolate as patient was day 14 at time of PCR and was no longer contagious. Repeat COVID-19 PCR on _____ was negative.     Patient showed improvement throughout hospitalization. Patient was seen and examined on day of discharge. Patient was medically optimized for discharge to [facility], with close outpatient follow up.  ---  CONSULTANTS:   Pulmonology - Dr. Spangler  ID - Dr. Yen  Heme/onc - Dr. Bah  Neurology - Dr. Corea   Palliative Care - Dr. Lizandro Justin     ---  TIME SPENT:  I, the attending physician, was physically present for the key portions of the evaluation and management (E/M) service provided. The total amount of time spent reviewing the hospital notes, laboratory values, imaging findings, assessing/counseling the patient, discussing with consultant physicians, social work, nursing staff was -- minutes    ---  Primary care provider was made aware of plan for discharge:      [  ] NO     [  ] YES   FROM ADMISSION H+P:   HPI:  81 yo female PMH dementia, HTN, asthma controlled, seasonal allergies presents to ED c/o SOB. Onset this AM. Hx obtained from daughter Yanelis Fraire as patient has hx of dementia with significant mental decline for past 1-2 months. Daughter states on Monday AM patient went to neighbor's house stating she was SOB. Neighbor called the ambulance and patient was transferred to [facility] and diagnosed with Covid,+ Rxed  with Regen, was observation  daughter does not know how patient got covid as patient is largely home bound with no visitors. Patient was d/c the same day (4/26) with albuterol inhaler and Losartan as pt was hypertensive. This AM, patient had similar episode where she went to neighbor's house and c/o SOB, so neighbor called ambulance and patient was transferred to [place]. Patient seen and examined at bedside, is a poor historian, A&Ox1 (person). Patient states she was SOB this AM but does not know why she is here. C/o chest tightness but denies cough, wheezing, CP, palpitations, fevers, chills, nausea, abd pain. When asked if patient was in hospital on Monday, she replied "I suppose so." Per daughter, patient with significant mental decline since March. Patient received the Pfizer  Covid vaccine  3/26   and since then has had a progressive decline in mental status 2nd dose Vaccine was given 4/16 , Prior to March patient was A&Ox3 and independent, however since then patient has had increased confusion, found wandering to [facility]. Patient does not have a PCP, has seen Dr. Clements once for pre-op clearance for cataract surgery 1 year ago. Daughter states they have been trying to make an appointment with a neurologist for her cognitive decline but have been unable to do so. Of note, patient has a history of hypotension and has never been on anti-hypertensive medications until Monday. Daughter states she was very surprised that her mother was found to be hypertensive and prescribed Losartan. Patient was given one dose yesterday.     In ED:  Vitals: T 98, HR 63, /76, RR 18, SpO2 100% on RA  Labs significant for: WBC 3.77, D-dimer 672, Cl 112, anion gap 2, AST 12, ALT 11  CTA: + acute bilateral pulmonary emboli, no evidence of R heart strain  CT Head: Small vessel ischemic changes in both hemispheres with volume loss and involutional change. No acute infarct or hemorrhagic lesion noted. Right frontal meningioma measuring approximately 1.1 cm.  EKG: NSR, rate 62, T wave abnormality   Given: 1L NS bolus x1, Albuterol inhaler x1, Lovenox 50mg x1  COVID 19-PCR -NEG       (28 Apr 2021 19:49)      ---  HOSPITAL COURSE: The patient was admitted for bilateral pulmonary embolism on CT scan. Patient was started on lovenox 50 mg BID and transitioned to Eliquis 10 mg BID. Patient was breathing comfortably on room air and did not require oxygen therapy. TTE showed no heart strain. Although patient COVID-19 PCR negative x2 in ED, patient with positive result 3 days prior to admission at [facility] in which she received antibody infusion per chart review. ID was consulted, patient first symptomatic 10 days prior to admission per family, patient outside of window to be contagious however PE likely secondary to previous COVID-19 infection. Neurology consulted for altered mental status, stated likely secondary to progressing dementia. Psychiatry consulted for decision making capacity, patient deemed to have no decision making capacity. Palliative care consulted for goals of care, patient made DNR/DNI with discussion with family. PT consulted and initially recommended BENJAMIN, however on repeat examination recommended home with 24 hour assist. COVID-19 PCR prior to discharge positive, per ID patient did not need to isolate as patient was day 14 at time of PCR and was no longer contagious.    Patient showed improvement throughout hospitalization. Patient was seen and examined on day of discharge. Patient was medically optimized for discharge home, with close outpatient follow up.    ---  CONSULTANTS:   Pulmonology - Dr. Spangler  ID - Dr. Yen  Heme/onc - Dr. Bah  Neurology - Dr. Corea   Palliative Care - Dr. Lizandro Justin     ---  TIME SPENT:  I, the attending physician, was physically present for the key portions of the evaluation and management (E/M) service provided. The total amount of time spent reviewing the hospital notes, laboratory values, imaging findings, assessing/counseling the patient, discussing with consultant physicians, social work, nursing staff was -- minutes    ---  Primary care provider was made aware of plan for discharge:      [  ] NO     [  ] YES   FROM ADMISSION H+P:   HPI:  79 yo female PMH dementia, HTN, asthma controlled, seasonal allergies presents to ED c/o SOB. Onset this AM. Hx obtained from daughter Yanelis Fraire as patient has hx of dementia with significant mental decline for past 1-2 months. Daughter states on Monday AM patient went to neighbor's house stating she was SOB. Neighbor called the ambulance and patient was transferred to [facility] and diagnosed with Covid,+ Rxed  with Regen, was observation  daughter does not know how patient got covid as patient is largely home bound with no visitors. Patient was d/c the same day (4/26) with albuterol inhaler and Losartan as pt was hypertensive. This AM, patient had similar episode where she went to neighbor's house and c/o SOB, so neighbor called ambulance and patient was transferred to [place]. Patient seen and examined at bedside, is a poor historian, A&Ox1 (person). Patient states she was SOB this AM but does not know why she is here. C/o chest tightness but denies cough, wheezing, CP, palpitations, fevers, chills, nausea, abd pain. When asked if patient was in hospital on Monday, she replied "I suppose so." Per daughter, patient with significant mental decline since March. Patient received the Pfizer  Covid vaccine  3/26   and since then has had a progressive decline in mental status 2nd dose Vaccine was given 4/16 , Prior to March patient was A&Ox3 and independent, however since then patient has had increased confusion, found wandering to [facility] [place]. Patient does not have a PCP, has seen Dr. Clements once for pre-op clearance for cataract surgery 1 year ago. Daughter states they have been trying to make an appointment with a neurologist for her cognitive decline but have been unable to do so. Of note, patient has a history of hypotension and has never been on anti-hypertensive medications until Monday. Daughter states she was very surprised that her mother was found to be hypertensive and prescribed Losartan. Patient was given one dose yesterday.     In ED:  Vitals: T 98, HR 63, /76, RR 18, SpO2 100% on RA  Labs significant for: WBC 3.77, D-dimer 672, Cl 112, anion gap 2, AST 12, ALT 11  CTA: + acute bilateral pulmonary emboli, no evidence of R heart strain  CT Head: Small vessel ischemic changes in both hemispheres with volume loss and involutional change. No acute infarct or hemorrhagic lesion noted. Right frontal meningioma measuring approximately 1.1 cm.  EKG: NSR, rate 62, T wave abnormality   Given: 1L NS bolus x1, Albuterol inhaler x1, Lovenox 50mg x1  COVID 19-PCR -NEG       (28 Apr 2021 19:49)      ---  HOSPITAL COURSE: The patient was admitted for bilateral pulmonary embolism on CT scan. Patient was started on lovenox 50 mg BID and transitioned to Eliquis 10 mg BID. Patient completed loading dose and was transitioned to eliquis 5 mg BID. Patient was breathing comfortably on room air and did not require oxygen therapy. TTE showed no heart strain. Although patient COVID-19 PCR negative x2 in ED, patient with positive result 3 days prior to admission at [facility] in which she received antibody infusion per chart review. ID was consulted, patient first symptomatic 10 days prior to admission per family, patient outside of window to be contagious however PE likely secondary to previous COVID-19 infection. Neurology consulted for altered mental status, stated likely secondary to progressing dementia. Psychiatry consulted for decision making capacity, patient deemed to have no decision making capacity. Palliative care consulted for goals of care, patient made DNR/DNI with discussion with family. PT consulted and initially recommended BENJAMIN, however on repeat examination recommended home with 24 hour assist. COVID-19 PCR prior to discharge positive, per ID patient did not need to isolate as patient was day 14 at time of PCR and was no longer contagious.    Patient showed improvement throughout hospitalization. Patient was seen and examined on day of discharge. Patient was medically optimized for discharge home, with close outpatient follow up.    ---  CONSULTANTS:   Pulmonology - Dr. Spangler  ID - Dr. Yen  Heme/onc - Dr. Bah  Neurology - Dr. Corea   Palliative Care - Dr. Lizandro Justin     ---  TIME SPENT:  I, the attending physician, was physically present for the key portions of the evaluation and management (E/M) service provided. The total amount of time spent reviewing the hospital notes, laboratory values, imaging findings, assessing/counseling the patient, discussing with consultant physicians, social work, nursing staff was -- minutes    ---  Primary care provider was made aware of plan for discharge:      [  ] NO     [  ] YES   FROM ADMISSION H+P:   HPI:  79 yo female PMH dementia, HTN, asthma controlled, seasonal allergies presents to ED c/o SOB. Onset this AM. Hx obtained from daughter Yanelis Fraire as patient has hx of dementia with significant mental decline for past 1-2 months. Daughter states on Monday AM patient went to neighbor's house stating she was SOB. Neighbor called the ambulance and patient was transferred to [facility] and diagnosed with Covid,+ Rxed  with Regen, was observation  daughter does not know how patient got covid as patient is largely home bound with no visitors. Patient was d/c the same day (4/26) with albuterol inhaler and Losartan as pt was hypertensive. This AM, patient had similar episode where she went to neighbor's house and c/o SOB, so neighbor called ambulance and patient was transferred to [place]. Patient seen and examined at bedside, is a poor historian, A&Ox1 (person). Patient states she was SOB this AM but does not know why she is here. C/o chest tightness but denies cough, wheezing, CP, palpitations, fevers, chills, nausea, abd pain. When asked if patient was in hospital on Monday, she replied "I suppose so." Per daughter, patient with significant mental decline since March. Patient received the Pfizer  Covid vaccine  3/26   and since then has had a progressive decline in mental status 2nd dose Vaccine was given 4/16 , Prior to March patient was A&Ox3 and independent, however since then patient has had increased confusion, found wandering to [facility] [place] [facility]". Patient does not have a PCP, has seen Dr. Clements once for pre-op clearance for cataract surgery 1 year ago. Daughter states they have been trying to make an appointment with a neurologist for her cognitive decline but have been unable to do so. Of note, patient has a history of hypotension and has never been on anti-hypertensive medications until Monday. Daughter states she was very surprised that her mother was found to be hypertensive and prescribed Losartan. Patient was given one dose yesterday.     In ED:  Vitals: T 98, HR 63, /76, RR 18, SpO2 100% on RA  Labs significant for: WBC 3.77, D-dimer 672, Cl 112, anion gap 2, AST 12, ALT 11  CTA: + acute bilateral pulmonary emboli, no evidence of R heart strain  CT Head: Small vessel ischemic changes in both hemispheres with volume loss and involutional change. No acute infarct or hemorrhagic lesion noted. Right frontal meningioma measuring approximately 1.1 cm.  EKG: NSR, rate 62, T wave abnormality   Given: 1L NS bolus x1, Albuterol inhaler x1, Lovenox 50mg x1  COVID 19-PCR -NEG       (28 Apr 2021 19:49)      ---  HOSPITAL COURSE: The patient was admitted for bilateral pulmonary embolism on CT scan. Patient was started on lovenox 50 mg BID and transitioned to Eliquis 10 mg BID. Patient completed loading dose and was transitioned to eliquis 5 mg BID. Patient was breathing comfortably on room air and did not require oxygen therapy. TTE showed no heart strain. Although patient COVID-19 PCR negative x2 in ED, patient with positive result 3 days prior to admission at [facility] in which she received antibody infusion per chart review. ID was consulted, patient first symptomatic 10 days prior to admission per family, patient outside of window to be contagious however PE likely secondary to previous COVID-19 infection. Neurology consulted for altered mental status, stated likely secondary to progressing dementia. Psychiatry consulted for decision making capacity, patient deemed to have no decision making capacity. Palliative care consulted for goals of care, patient made DNR/DNI with discussion with family. PT consulted and initially recommended BENJAMIN, however on repeat examination recommended home with 24 hour assist. COVID-19 PCR prior to discharge positive, per ID patient did not need to isolate as patient was day 14 at time of PCR and was no longer contagious.    Patient showed improvement throughout hospitalization. Patient was seen and examined on day of discharge. Patient was medically optimized for discharge home, with close outpatient follow up.PT eval done , Pt can go home with 24 hrs supervision with family or HHA for safety.  D/W Social service & case management.    ---  CONSULTANTS:   Pulmonology - Dr. Spangler  ID - Dr. Yen  Heme/onc - Dr. Bah  Neurology - Dr. Corea   Palliative Care - Dr. Lizandro Justin     ---  TIME SPENT:  I, the attending physician, was physically present for the key portions of the evaluation and management (E/M) service provided. The total amount of time spent reviewing the hospital notes, laboratory values, imaging findings, assessing/counseling the patient, discussing with consultant physicians, social work, nursing staff was 60  minutes

## 2021-04-30 NOTE — DISCHARGE NOTE PROVIDER - NSDCFUADDINST_GEN_ALL_CORE_FT
Follow up with PMD in 1 week   -Follow up with hematologist Dr santillan in 2-3 weeks to renew your Eliquis -Blood thinner prescription, as you need to take this medication for 3-6 months, You MUST follow up with Dr Santillan every month, Please DO NOT STOP this medication with consulting with your MD  - Be Compliant with the medication

## 2021-04-30 NOTE — PROGRESS NOTE ADULT - NSICDXPROBLEMPLAN1_GEN_ALL_CORE_FT
-CTA showing acute bilateral pulmonary emboli, no evidence of R heart strain   -EKG showing NSR, rate 62, t wave abnormality, no signs of R heart strain  -TTE shows no heart strain  -Continue to monitor on tele with cont pulse ox, pt on RA  -Transition from Lovenox 50mg BID to eliquis 10 mg BID  -B/l dopplers lower extremities neg for DVT   -CXR neg for active pulm disease   -Monitor for signs/symptoms of bleeding  -Pulm Dr. Spangler consulted  -Heme Dr. Bah d/w [facility] records reviewd , pt was COVID + 4/26, s/p monoclonal AB given , Pt has been Fully Vaccinated 2 inj completed. pt's initial SOB was 1 week prior to [facility]. Per ID, pt no longer contagious

## 2021-04-30 NOTE — DISCHARGE NOTE PROVIDER - NSDCMRMEDTOKEN_GEN_ALL_CORE_FT
losartan 50 mg oral tablet: 1 tab(s) orally once a day  Proventil HFA 90 mcg/inh inhalation aerosol: 1 puff(s) inhaled every 4 hours- for shortness of breath and/or wheezing   Eliquis Starter Pack for Treatment of DVT and PE 5 mg oral tablet: 1 packet(s) orally 2 times a day as directed   losartan 50 mg oral tablet: 1 tab(s) orally once a day  Proventil HFA 90 mcg/inh inhalation aerosol: 1 puff(s) inhaled every 4 hours- for shortness of breath and/or wheezing

## 2021-04-30 NOTE — PROGRESS NOTE ADULT - SUBJECTIVE AND OBJECTIVE BOX
Date/Time Patient Seen:  		  Referring MD:   Data Reviewed	       Patient is a 80y old  Female who presents with a chief complaint of PE (29 Apr 2021 15:50)      Subjective/HPI     PAST MEDICAL & SURGICAL HISTORY:  No pertinent past medical history    Uncomplicated asthma, unspecified asthma severity  past history - currently stable with no medications    HTN (hypertension)    No significant past surgical history    S/P cataract surgery    History of partial hysterectomy          Medication list         MEDICATIONS  (STANDING):  enoxaparin Injectable 50 milliGRAM(s) SubCutaneous every 12 hours  loratadine 10 milliGRAM(s) Oral daily  losartan 50 milliGRAM(s) Oral daily  pantoprazole    Tablet 40 milliGRAM(s) Oral before breakfast    MEDICATIONS  (PRN):  ALBUTerol    90 MICROgram(s) HFA Inhaler 1 Puff(s) Inhalation every 4 hours PRN Shortness of Breath and/or Wheezing         Vitals log        ICU Vital Signs Last 24 Hrs  T(C): 36.4 (30 Apr 2021 04:46), Max: 36.4 (29 Apr 2021 12:46)  T(F): 97.6 (30 Apr 2021 04:46), Max: 97.6 (29 Apr 2021 12:46)  HR: 60 (30 Apr 2021 04:46) (60 - 75)  BP: 144/71 (30 Apr 2021 04:46) (129/72 - 158/81)  BP(mean): --  ABP: --  ABP(mean): --  RR: 18 (30 Apr 2021 04:46) (18 - 19)  SpO2: 95% (30 Apr 2021 04:46) (95% - 97%)           Input and Output:  I&O's Detail    28 Apr 2021 07:01  -  29 Apr 2021 07:00  --------------------------------------------------------  IN:  Total IN: 0 mL    OUT:    Voided (mL): 500 mL  Total OUT: 500 mL    Total NET: -500 mL      29 Apr 2021 07:01  -  30 Apr 2021 06:09  --------------------------------------------------------  IN:    Oral Fluid: 780 mL  Total IN: 780 mL    OUT:    Voided (mL): 300 mL  Total OUT: 300 mL    Total NET: 480 mL          Lab Data                        12.3   3.89  )-----------( 188      ( 29 Apr 2021 07:25 )             37.3     04-29    142  |  111<H>  |  12  ----------------------------<  64<L>  3.4<L>   |  24  |  0.47<L>    Ca    8.3<L>      29 Apr 2021 07:25  Phos  2.6     04-29  Mg     2.2     04-29    TPro  6.2  /  Alb  3.4  /  TBili  1.2  /  DBili  x   /  AST  11<L>  /  ALT  10<L>  /  AlkPhos  62  04-29      CARDIAC MARKERS ( 28 Apr 2021 15:07 )  <.015 ng/mL / x     / x     / x     / x            Review of Systems	      Objective     Physical Examination    heart s1s2  lung dec BS  abd soft  head nc  on RA  verbal  chest symmetric      Pertinent Lab findings & Imaging      Marta:  NO   Adequate UO     I&O's Detail    28 Apr 2021 07:01  -  29 Apr 2021 07:00  --------------------------------------------------------  IN:  Total IN: 0 mL    OUT:    Voided (mL): 500 mL  Total OUT: 500 mL    Total NET: -500 mL      29 Apr 2021 07:01  -  30 Apr 2021 06:09  --------------------------------------------------------  IN:    Oral Fluid: 780 mL  Total IN: 780 mL    OUT:    Voided (mL): 300 mL  Total OUT: 300 mL    Total NET: 480 mL               Discussed with:     Cultures:	        Radiology

## 2021-04-30 NOTE — DISCHARGE NOTE PROVIDER - NSDCACTIVITY_GEN_ALL_CORE
No heavy lifting/straining Do not drive or operate machinery/Do not make important decisions/No heavy lifting/straining Bathing allowed/Do not drive or operate machinery/Showering allowed/Do not make important decisions/Walking - Indoors allowed/No heavy lifting/straining

## 2021-04-30 NOTE — PROGRESS NOTE ADULT - ATTENDING COMMENTS
79 yo female PMH dementia, HTN, asthma controlled, seasonal allergies presents to ED c/o SOB, onset this AM. Patient discharged from [facility] on Monday 4/16 and diagnosed with Covid at that time. Patient admitted for acute bilat pulmonary emboli found on CTA.  pt seen, examined, case & care plan d/w, pt 's Dtr , residents at detail.  D/W Dr Bah, DR Yen at detail.  D/W  at detail about pt's safety at home.  NO Need for isolation as d/w DR Yen.  PO diet  PT Eval. 81 yo female PMH dementia, HTN, asthma controlled, seasonal allergies presents to ED c/o SOB, onset this AM. Patient discharged from [facility] on Monday 4/16 and diagnosed with Covid at that time. Patient admitted for acute bilat pulmonary emboli found on CTA.  pt seen, examined, case & care plan d/w, pt 's Dtr , residents at detail.  D/W Dr Mart H/O   D/W dtr today , PT----> BENJAMIN d/c planning  NO Need for isolation as d/w DR Yen.  PO diet  D/W Social service ----> Awaiting Auth

## 2021-04-30 NOTE — DISCHARGE NOTE PROVIDER - PROVIDER TOKENS
PROVIDER:[TOKEN:[[id number]]] PROVIDER:[TOKEN:[[id number]]],PROVIDER:[TOKEN:[[id number]]],PROVIDER:[TOKEN:[[id number]]]

## 2021-04-30 NOTE — PROGRESS NOTE ADULT - SUBJECTIVE AND OBJECTIVE BOX
[facility] DIVISION of INFECTIOUS DISEASE  Sarmad Yen MD PhD, Cielo Paniagua MD, Elvie Wallace MD, Gianni Mandujano MD  and providing coverage with Naomi Lira MD and Darrell Mart MD  Providing Infectious Disease Consultations at [facility], [facility], [facility]'s      Office# [phone] to schedule follow up appointments  Answering Service for urgent calls or New Consults [phone]  Cell# to text for urgent issues Sarmad Yen [phone]     Infectious diseases progress note:    ANIRUDH RICHARDSON is a 80y y. o. Female patient    COVID Patient    Allergies    No Known Allergies    Intolerances        ANTIBIOTICS/RELEVANT:  antimicrobials    immunologic:    OTHER:  ALBUTerol    90 MICROgram(s) HFA Inhaler 1 Puff(s) Inhalation every 4 hours PRN  apixaban 10 milliGRAM(s) Oral every 12 hours  loratadine 10 milliGRAM(s) Oral daily  losartan 50 milliGRAM(s) Oral daily  melatonin 3 milliGRAM(s) Oral at bedtime  pantoprazole    Tablet 40 milliGRAM(s) Oral before breakfast  potassium phosphate / sodium phosphate Tablet (K-PHOS No. 2) 1 Tablet(s) Oral four times a day      Objective:  Vital Signs Last 24 Hrs  T(C): 36.4 (30 Apr 2021 11:47), Max: 36.4 (29 Apr 2021 19:24)  T(F): 97.6 (30 Apr 2021 11:47), Max: 97.6 (29 Apr 2021 19:24)  HR: 65 (30 Apr 2021 11:47) (60 - 73)  BP: 122/86 (30 Apr 2021 11:47) (109/58 - 144/71)  BP(mean): --  RR: 18 (30 Apr 2021 11:47) (18 - 19)  SpO2: 98% (30 Apr 2021 11:47) (95% - 98%)    T(C): 36.4 (04-30-21 @ 11:47), Max: 36.9 (04-28-21 @ 21:38)  T(C): 36.4 (04-30-21 @ 11:47), Max: 36.9 (04-28-21 @ 21:38)  T(C): 36.4 (04-30-21 @ 11:47), Max: 36.9 (04-28-21 @ 21:38)    PHYSICAL EXAM:  HEENT: NC atraumatic  Neck: supple  Respiratory: no accessory muscle use, breathing comfortably  Cardiovascular: distant  Gastrointestinal: normal appearing, nondistended  Extremities: no clubbing, no cyanosis,        LABS:                          13.0   4.37  )-----------( 218      ( 30 Apr 2021 06:50 )             39.7       4.37 04-30 @ 06:50  3.89 04-29 @ 07:25  3.77 04-28 @ 15:07      04-30    142  |  110<H>  |  13  ----------------------------<  79  4.0   |  28  |  0.67    Ca    9.3      30 Apr 2021 06:50  Phos  2.4     04-30  Mg     2.3     04-30    TPro  6.2  /  Alb  3.4  /  TBili  1.2  /  DBili  x   /  AST  11<L>  /  ALT  10<L>  /  AlkPhos  62  04-29      Creatinine, Serum: 0.67 mg/dL (04-30-21 @ 06:50)  Creatinine, Serum: 0.47 mg/dL (04-29-21 @ 07:25)  Creatinine, Serum: 0.65 mg/dL (04-28-21 @ 15:07)      PT/INR - ( 29 Apr 2021 07:25 )   PT: 13.3 sec;   INR: 1.14 ratio         PTT - ( 29 Apr 2021 07:25 )  PTT:36.8 sec          COVID RISK SCORE  Auto Neutrophil #: 1.61 K/uL (04-29-21 @ 07:25)  Auto Lymphocyte #: 1.45 K/uL (04-29-21 @ 07:25)  Auto Neutrophil #: 1.56 K/uL (04-28-21 @ 15:07)  Auto Lymphocyte #: 1.39 K/uL (04-28-21 @ 15:07)      Auto Eosinophil #: 0.35 K/uL (04-29-21 @ 07:25)  Auto Eosinophil #: 0.34 K/uL (04-28-21 @ 15:07)          Troponin I, Serum: <.015 ng/mL (04-28-21 @ 15:07)                INR: 1.14 ratio (04-29-21 @ 07:25)  Activated Partial Thromboplastin Time: 36.8 sec (04-29-21 @ 07:25)    D-Dimer Assay, Quantitative: 672 ng/mL DDU (04-28-21 @ 15:07)        MICROBIOLOGY:              RADIOLOGY & ADDITIONAL STUDIES:

## 2021-05-01 LAB
ANION GAP SERPL CALC-SCNC: 3 MMOL/L — LOW (ref 5–17)
BUN SERPL-MCNC: 10 MG/DL — SIGNIFICANT CHANGE UP (ref 7–23)
CALCIUM SERPL-MCNC: 8.8 MG/DL — SIGNIFICANT CHANGE UP (ref 8.5–10.1)
CHLORIDE SERPL-SCNC: 111 MMOL/L — HIGH (ref 96–108)
CO2 SERPL-SCNC: 30 MMOL/L — SIGNIFICANT CHANGE UP (ref 22–31)
CREAT SERPL-MCNC: 0.67 MG/DL — SIGNIFICANT CHANGE UP (ref 0.5–1.3)
GLUCOSE SERPL-MCNC: 86 MG/DL — SIGNIFICANT CHANGE UP (ref 70–99)
HCT VFR BLD CALC: 40.1 % — SIGNIFICANT CHANGE UP (ref 34.5–45)
HGB BLD-MCNC: 13.6 G/DL — SIGNIFICANT CHANGE UP (ref 11.5–15.5)
MAGNESIUM SERPL-MCNC: 2.3 MG/DL — SIGNIFICANT CHANGE UP (ref 1.6–2.6)
MCHC RBC-ENTMCNC: 31.8 PG — SIGNIFICANT CHANGE UP (ref 27–34)
MCHC RBC-ENTMCNC: 33.9 GM/DL — SIGNIFICANT CHANGE UP (ref 32–36)
MCV RBC AUTO: 93.7 FL — SIGNIFICANT CHANGE UP (ref 80–100)
NRBC # BLD: 0 /100 WBCS — SIGNIFICANT CHANGE UP (ref 0–0)
PHOSPHATE SERPL-MCNC: 4.1 MG/DL — SIGNIFICANT CHANGE UP (ref 2.5–4.5)
PLATELET # BLD AUTO: 210 K/UL — SIGNIFICANT CHANGE UP (ref 150–400)
POTASSIUM SERPL-MCNC: 4 MMOL/L — SIGNIFICANT CHANGE UP (ref 3.5–5.3)
POTASSIUM SERPL-SCNC: 4 MMOL/L — SIGNIFICANT CHANGE UP (ref 3.5–5.3)
RBC # BLD: 4.28 M/UL — SIGNIFICANT CHANGE UP (ref 3.8–5.2)
RBC # FLD: 13.3 % — SIGNIFICANT CHANGE UP (ref 10.3–14.5)
SODIUM SERPL-SCNC: 144 MMOL/L — SIGNIFICANT CHANGE UP (ref 135–145)
WBC # BLD: 4.74 K/UL — SIGNIFICANT CHANGE UP (ref 3.8–10.5)
WBC # FLD AUTO: 4.74 K/UL — SIGNIFICANT CHANGE UP (ref 3.8–10.5)

## 2021-05-01 RX ADMIN — Medication 1 TABLET(S): at 03:33

## 2021-05-01 RX ADMIN — APIXABAN 10 MILLIGRAM(S): 2.5 TABLET, FILM COATED ORAL at 17:56

## 2021-05-01 RX ADMIN — Medication 3 MILLIGRAM(S): at 21:47

## 2021-05-01 RX ADMIN — Medication 1 TABLET(S): at 21:47

## 2021-05-01 RX ADMIN — Medication 1 TABLET(S): at 09:57

## 2021-05-01 RX ADMIN — Medication 1 TABLET(S): at 12:18

## 2021-05-01 RX ADMIN — PANTOPRAZOLE SODIUM 40 MILLIGRAM(S): 20 TABLET, DELAYED RELEASE ORAL at 06:30

## 2021-05-01 RX ADMIN — LORATADINE 10 MILLIGRAM(S): 10 TABLET ORAL at 12:18

## 2021-05-01 RX ADMIN — LOSARTAN POTASSIUM 50 MILLIGRAM(S): 100 TABLET, FILM COATED ORAL at 05:53

## 2021-05-01 RX ADMIN — APIXABAN 10 MILLIGRAM(S): 2.5 TABLET, FILM COATED ORAL at 05:53

## 2021-05-01 RX ADMIN — Medication 1 TABLET(S): at 17:56

## 2021-05-01 NOTE — PROGRESS NOTE ADULT - NSICDXPROBLEMPLAN1_GEN_ALL_CORE_FT
Hpi Title: Evaluation of Skin Lesions How Severe Are Your Spot(S)?: moderate Have Your Spot(S) Been Treated In The Past?: has not been treated -CTA showing acute bilateral pulmonary emboli, no evidence of R heart strain   -EKG showing NSR, rate 62, t wave abnormality, no signs of R heart strain  -TTE shows no heart strain  -Continue to monitor on tele with cont pulse ox, pt on RA  -Transition from Lovenox 50mg BID to eliquis 10 mg BID  -B/l dopplers lower extremities neg for DVT   -CXR neg for active pulm disease   -Monitor for signs/symptoms of bleeding  -Pulm Dr. Spangler consulted  -Heme Dr. Bah d/w [facility] records reviewd , pt was COVID + 4/26, s/p monoclonal AB given , Pt has been Fully Vaccinated 2 inj completed. pt's initial SOB was 1 week prior to [facility]. Per ID, pt no longer contagious

## 2021-05-01 NOTE — PROGRESS NOTE ADULT - SUBJECTIVE AND OBJECTIVE BOX
Interval History:  up in chair appears comfortable  Chart reviewed and events noted;   Overnight events:    MEDICATIONS  (STANDING):  apixaban 10 milliGRAM(s) Oral every 12 hours  loratadine 10 milliGRAM(s) Oral daily  losartan 50 milliGRAM(s) Oral daily  melatonin 3 milliGRAM(s) Oral at bedtime  pantoprazole    Tablet 40 milliGRAM(s) Oral before breakfast  potassium phosphate / sodium phosphate Tablet (K-PHOS No. 2) 1 Tablet(s) Oral four times a day    MEDICATIONS  (PRN):  ALBUTerol    90 MICROgram(s) HFA Inhaler 1 Puff(s) Inhalation every 4 hours PRN Shortness of Breath and/or Wheezing      Vital Signs Last 24 Hrs  T(C): 36.4 (01 May 2021 08:09), Max: 36.5 (30 Apr 2021 19:01)  T(F): 97.6 (01 May 2021 08:09), Max: 97.7 (30 Apr 2021 19:01)  HR: 62 (01 May 2021 08:09) (57 - 74)  BP: 136/81 (01 May 2021 08:09) (128/77 - 157/79)  BP(mean): --  RR: 16 (01 May 2021 08:09) (16 - 18)  SpO2: 96% (01 May 2021 08:09) (96% - 98%)    PHYSICAL EXAM  General: adult in NAD  HEENT: clear oropharynx, anicteric sclera, pink conjunctivae  Neck: supple  CV: normal S1S2 with no murmur rubs or gallops  Lungs: clear to auscultation, no wheezes, no rhales  Abdomen: soft non-tender non-distended, no hepato/splenomegaly  Ext: no clubbing cyanosis or edema  Skin: no rashes and no petichiae  Neuro: alert and not oriented      LABS:  CBC Full  -  ( 01 May 2021 08:23 )  WBC Count : 4.74 K/uL  RBC Count : 4.28 M/uL  Hemoglobin : 13.6 g/dL  Hematocrit : 40.1 %  Platelet Count - Automated : 210 K/uL  Mean Cell Volume : 93.7 fl  Mean Cell Hemoglobin : 31.8 pg  Mean Cell Hemoglobin Concentration : 33.9 gm/dL  Auto Neutrophil # : x  Auto Lymphocyte # : x  Auto Monocyte # : x  Auto Eosinophil # : x  Auto Basophil # : x  Auto Neutrophil % : x  Auto Lymphocyte % : x  Auto Monocyte % : x  Auto Eosinophil % : x  Auto Basophil % : x    05-01    144  |  111<H>  |  10  ----------------------------<  86  4.0   |  30  |  0.67    Ca    8.8      01 May 2021 08:23  Phos  4.1     05-01  Mg     2.3     05-01          fe studies      WBC trend  4.74 K/uL (05-01-21 @ 08:23)  4.37 K/uL (04-30-21 @ 06:50)  3.89 K/uL (04-29-21 @ 07:25)      Hgb trend  13.6 g/dL (05-01-21 @ 08:23)  13.0 g/dL (04-30-21 @ 06:50)  12.3 g/dL (04-29-21 @ 07:25)      plt trend  210 K/uL (05-01-21 @ 08:23)  218 K/uL (04-30-21 @ 06:50)  188 K/uL (04-29-21 @ 07:25)        RADIOLOGY & ADDITIONAL STUDIES:

## 2021-05-01 NOTE — PROGRESS NOTE ADULT - ATTENDING COMMENTS
79 yo female PMH dementia, HTN, asthma controlled, seasonal allergies presents to ED c/o SOB, onset this AM. Patient discharged from [facility] on Monday 4/16 and diagnosed with Covid at that time. Patient admitted for acute bilat pulmonary emboli found on CTA.  pt seen, examined, case & care plan d/w, pt 's Dtr, residents at detail.  D/W Dr Bah follow up  D/W dtr today , PT----> BENJAMIN d/c planning  NO Need for isolation as d/w DR Yen.  PO diet  D/W Social service ----> Awaiting Auth.   Total care time is 45 minutes.

## 2021-05-01 NOTE — PROGRESS NOTE ADULT - SUBJECTIVE AND OBJECTIVE BOX
Neurology Follow up note    ANIRUDH RICHARDSON80yFemale    HPI:  79 yo female PMH dementia, HTN, asthma controlled, seasonal allergies presents to ED c/o SOB. Onset this AM. Hx obtained from daughter Yanelis Fraire as patient has hx of dementia with significant mental decline for past 1-2 months. Daughter states on Monday AM patient went to neighbors house stating she was SOB. Neighbor called the ambulance and patient was transferred to [facility] and diagnosed with Covid,+ Rxed  with Regen, was observation  daughter does not know how patient got covid as patient is largely home bound with no visitors. Patient was d/c the same day (4/26) with albuterol inhaler and Losartan as pt was hypertensive. This AM, patient had similar episode where she went to [facility] and c/o SOB, so neighbor called ambulance and patient was transferred to [facility]. Patient seen and examined at bedside, is a poor historian, A&Ox1 (person). Patient states she was SOB this AM but does not know why she is here. C/o chest tightness but denies cough, wheezing, CP, palpitations, fevers, chills, nausea, abd pain. When asked if patient was in hospital on Monday, she replied "I suppose so." Per daughter, patient with significant mental decline since March. Patient received the Pfizer  Covid vaccine  3/26   and since then has had a progressive decline in mental status 2nd dose Vaccine was given 4/16 , Prior to March patient was A&Ox3 and independent, however since then patient has had increased confusion, found wandering to [facility]. Patient does not have a PCP, has seen Dr. Clements once for pre-op clearance for cataract surgery 1 year ago. Daughter states they have been trying to make an appointment with a neurologist for her cognitive decline but have been unable to do so. Of note, patient has a history of hypotension and has never been on anti-hypertensive medications until Monday. Daughter states she was very surprised that her mother was found to be hypertensive and prescribed Losartan. Patient was given one dose yesterday.     In ED:  Vitals: T 98, HR 63, /76, RR 18, SpO2 100% on RA  Labs significant for: WBC 3.77, D-dimer 672, Cl 112, anion gap 2, AST 12, ALT 11  CTA: + acute bilateral pulmonary emboli, no evidence of R heart strain  CT Head: Small vessel ischemic changes in both hemispheres with volume loss and involutional change. No acute infarct or hemorrhagic lesion noted. Right frontal meningioma measuring approximately 1.1 cm.  EKG: NSR, rate 62, T wave abnormality   Given: 1L NS bolus x1, Albuterol inhaler x1, Lovenox 50mg x1  COVID 19-PCR -NEG       (28 Apr 2021 19:49)      Interval History - no new complaints    Patient is seen, chart was reviewed and case was discussed with the treatment team.  Pt is not in any distress.   Lying on bed comfortably.     Vital Signs Last 24 Hrs  T(C): 36.4 (01 May 2021 08:09), Max: 36.5 (30 Apr 2021 19:01)  T(F): 97.6 (01 May 2021 08:09), Max: 97.7 (30 Apr 2021 19:01)  HR: 62 (01 May 2021 08:09) (57 - 74)  BP: 136/81 (01 May 2021 08:09) (128/77 - 157/79)  BP(mean): --  RR: 16 (01 May 2021 08:09) (16 - 18)  SpO2: 96% (01 May 2021 08:09) (96% - 98%)        REVIEW OF SYSTEMS:    Constitutional: No fever,   Eyes: No eye pain, visual disturbances, or discharge  ENT:  No difficulty hearing, tinnitus, vertigo; No sinus or throat pain  Neck: No pain or stiffness  Respiratory: No , wheezing, chills or hemoptysis  Cardiovascular: No chest pain, palpitations, s  Gastrointestinal: No abdominal or epigastric pain. No nausea, vomiting or hematemesis;   Genitourinary: No dysuria, frequency, hematuria or incontinence  Neurological: No headaches,   Psychiatric: No d, mood swings or difficulty sleeping  Musculoskeletal: No joint pain or swelling; No muscle, back or extremity pain  Skin: No itching, burning, rashes or lesions   Lymph Nodes: No enlarged glands  Endocrine: No heat or cold intolerance;  Allergy and Immunologic: No hives or eczema    On Neurological Examination:    Mental Status - Pt is alert, awake, oriented X1  . Follows commands well and able to answer questions appropriately    Speech -  Normal.    Cranial Nerves - Pupils 3 mm equal and reactive to light, extraocular eye movements intact. Pt has no visual field deficit.  Pt has no facial asymmetry. Facial sensation is intact.Tongue - is in midline.    Muscle tone - is normal     Motor Exam - 4+/5 all over,   No drift. No shaking or tremors.    Sensory Exam . Pt withdraws all extremities equally on stimulation. No asymmetry seen. No complaints of tingling, numbness.        coordination:    Finger to nose: normal    Heel to shin: normal    Deep tendon Reflexes - 2 plus all over.        Romberg - Negative.    Neck Supple -  Yes.     MEDICATIONS    ALBUTerol    90 MICROgram(s) HFA Inhaler 1 Puff(s) Inhalation every 4 hours PRN  apixaban 10 milliGRAM(s) Oral every 12 hours  loratadine 10 milliGRAM(s) Oral daily  losartan 50 milliGRAM(s) Oral daily  melatonin 3 milliGRAM(s) Oral at bedtime  pantoprazole    Tablet 40 milliGRAM(s) Oral before breakfast  potassium phosphate / sodium phosphate Tablet (K-PHOS No. 2) 1 Tablet(s) Oral four times a day      Allergies    No Known Allergies    Intolerances                            13.6   4.74  )-----------( 210      ( 01 May 2021 08:23 )             40.1         05-01    144  |  111<H>  |  10  ----------------------------<  86  4.0   |  30  |  0.67    Ca    8.8      01 May 2021 08:23  Phos  4.1     05-01  Mg     2.3     05-01        Hemoglobin A1C:     Vitamin B12     RADIOLOGY    ASSESSMENT AND PLAN:      seen for ams-  metabolic encephalopathy  dementia  PE    dementia work up in progress  on   Physical therapy evaluation.  OOB to chair/ambulation with assistance only.  Advanced care planning was discussed with family.  Pain is accessed and addressed.

## 2021-05-01 NOTE — PROGRESS NOTE ADULT - SUBJECTIVE AND OBJECTIVE BOX
Date/Time Patient Seen:  		  Referring MD:   Data Reviewed	       Patient is a 80y old  Female who presents with a chief complaint of SOB (30 Apr 2021 16:33)      Subjective/HPI     PAST MEDICAL & SURGICAL HISTORY:  No pertinent past medical history    Uncomplicated asthma, unspecified asthma severity  past history - currently stable with no medications    HTN (hypertension)    No significant past surgical history    S/P cataract surgery    History of partial hysterectomy          Medication list         MEDICATIONS  (STANDING):  apixaban 10 milliGRAM(s) Oral every 12 hours  loratadine 10 milliGRAM(s) Oral daily  losartan 50 milliGRAM(s) Oral daily  melatonin 3 milliGRAM(s) Oral at bedtime  pantoprazole    Tablet 40 milliGRAM(s) Oral before breakfast  potassium phosphate / sodium phosphate Tablet (K-PHOS No. 2) 1 Tablet(s) Oral four times a day    MEDICATIONS  (PRN):  ALBUTerol    90 MICROgram(s) HFA Inhaler 1 Puff(s) Inhalation every 4 hours PRN Shortness of Breath and/or Wheezing         Vitals log        ICU Vital Signs Last 24 Hrs  T(C): 36.3 (01 May 2021 05:20), Max: 36.5 (30 Apr 2021 19:01)  T(F): 97.3 (01 May 2021 05:20), Max: 97.7 (30 Apr 2021 19:01)  HR: 57 (01 May 2021 05:20) (57 - 74)  BP: 128/77 (01 May 2021 05:20) (109/58 - 157/79)  BP(mean): --  ABP: --  ABP(mean): --  RR: 18 (01 May 2021 05:20) (17 - 18)  SpO2: 98% (01 May 2021 05:20) (95% - 98%)           Input and Output:  I&O's Detail    29 Apr 2021 07:01  -  30 Apr 2021 07:00  --------------------------------------------------------  IN:    Oral Fluid: 780 mL  Total IN: 780 mL    OUT:    Voided (mL): 301 mL  Total OUT: 301 mL    Total NET: 479 mL          Lab Data                        13.0   4.37  )-----------( 218      ( 30 Apr 2021 06:50 )             39.7     04-30    142  |  110<H>  |  13  ----------------------------<  79  4.0   |  28  |  0.67    Ca    9.3      30 Apr 2021 06:50  Phos  2.4     04-30  Mg     2.3     04-30    TPro  6.2  /  Alb  3.4  /  TBili  1.2  /  DBili  x   /  AST  11<L>  /  ALT  10<L>  /  AlkPhos  62  04-29            Review of Systems	      Objective     Physical Examination    heart s1s2  lung dec BS  abd soft  head nc      Pertinent Lab findings & Imaging      Marta:  NO   Adequate UO     I&O's Detail    29 Apr 2021 07:01  -  30 Apr 2021 07:00  --------------------------------------------------------  IN:    Oral Fluid: 780 mL  Total IN: 780 mL    OUT:    Voided (mL): 301 mL  Total OUT: 301 mL    Total NET: 479 mL               Discussed with:     Cultures:	        Radiology

## 2021-05-01 NOTE — PROGRESS NOTE ADULT - NSICDXPROBLEMPLAN2_GEN_ALL_CORE_FT
-Per daughter, patient with increased confusion past 1-2 months  -CT head +Small vessel ischemic changes in both hemispheres with volume loss and involutional change. No acute infarct or hemorrhagic lesion noted. Right frontal meningioma measuring approximately 1.1 cm.  -A&Ox1 at baseline on admission  -Likely 2/2 worsening dementia  -COVID PCR neg, RVP neg  -Thyroid panel, B12, folate all wnl  -Fall precautions, aspiration precautions  -PT consulted, recommend BENJAMIN  -Neuro consulted Dr. Corea, recs appreciated   -Palliative Santhosh consulted for GOC discussion, DNR/DNI  -SW/CM consult

## 2021-05-01 NOTE — PROGRESS NOTE ADULT - SUBJECTIVE AND OBJECTIVE BOX
Patient is a 80y old  Female who presents with a chief complaint of SOB (30 Apr 2021 16:33)      HPI:  81 yo female PMH dementia, HTN, asthma controlled, seasonal allergies presents to ED c/o SOB. Onset this AM. Hx obtained from daughter Yanelis Fraire as patient has hx of dementia with significant mental decline for past 1-2 months. Daughter states on Monday AM patient went to [facility] stating she was SOB. Neighbor called the ambulance and patient was transferred to [facility] and diagnosed with Covid,+ Rxed  with Regen, was observation  daughter does not know how patient got covid as patient is largely home bound with no visitors. Patient was d/c the same day (4/26) with albuterol inhaler and Losartan as pt was hypertensive. This AM, patient had similar episode where she went to [facility] and c/o SOB, so neighbor called ambulance and patient was transferred to [place]. Patient seen and examined at bedside, is a poor historian, A&Ox1 (person). Patient states she was SOB this AM but does not know why she is here. C/o chest tightness but denies cough, wheezing, CP, palpitations, fevers, chills, nausea, abd pain. When asked if patient was in hospital on Monday, she replied "I suppose so." Per daughter, patient with significant mental decline since March. Patient received the Pfizer  Covid vaccine  3/26   and since then has had a progressive decline in mental status 2nd dose Vaccine was given 4/16 , Prior to March patient was A&Ox3 and independent, however since then patient has had increased confusion, found wandering to [facility]. Patient does not have a PCP, has seen Dr. Clements once for pre-op clearance for cataract surgery 1 year ago. Daughter states they have been trying to make an appointment with a neurologist for her cognitive decline but have been unable to do so. Of note, patient has a history of hypotension and has never been on anti-hypertensive medications until Monday. Daughter states she was very surprised that her mother was found to be hypertensive and prescribed Losartan. Patient was given one dose yesterday.     In ED:  Vitals: T 98, HR 63, /76, RR 18, SpO2 100% on RA  Labs significant for: WBC 3.77, D-dimer 672, Cl 112, anion gap 2, AST 12, ALT 11  CTA: + acute bilateral pulmonary emboli, no evidence of R heart strain  CT Head: Small vessel ischemic changes in both hemispheres with volume loss and involutional change. No acute infarct or hemorrhagic lesion noted. Right frontal meningioma measuring approximately 1.1 cm.  EKG: NSR, rate 62, T wave abnormality   Given: 1L NS bolus x1, Albuterol inhaler x1, Lovenox 50mg x1  COVID 19-PCR -NEG       (28 Apr 2021 19:49)      INTERVAL HPI:  4/29/21: Pt admitted overnight for acute b/l PE. Pt seen and examined. Pt A&Ox1, unsure of why she is here. Pleasant, on RA and denies SOB. Has no acute complaints this AM. On lovenox 50 mg daily for PE. CXR no acute pulm disease, US duplex no dvt b/l. F/u TTE.   4/30/21: No acute overnight events. Per RN, pt did not sleep much throughout the night. Confused, tries to get out of bed but is redirectable and pleasant. Pt seen and examined, pt outside by nursing station as she keeps trying to climb out of bed. Pt confused, A&Ox1, but pleasant. She has no acute complaints. She is tolerating room air. Pt switched to eliquis 10 mg bid. TTE with no heart strain. PT recommending BENJAMIN.   5/1/21: No acute overnight events. Patient is very confused, but redirectable and pleasant. Mildly somnolent but responds to verbal and physical stimuli. A&Ox0-1 but pleasant. Tolerating room air. On Eliquis 10 mg BID. PT recommending BENJAMIN.    OVERNIGHT EVENTS: NONE    Home Medications:  losartan 50 mg oral tablet: 1 tab(s) orally once a day (28 Apr 2021 19:49)      MEDICATIONS  (STANDING):  apixaban 10 milliGRAM(s) Oral every 12 hours  loratadine 10 milliGRAM(s) Oral daily  losartan 50 milliGRAM(s) Oral daily  melatonin 3 milliGRAM(s) Oral at bedtime  pantoprazole    Tablet 40 milliGRAM(s) Oral before breakfast  potassium phosphate / sodium phosphate Tablet (K-PHOS No. 2) 1 Tablet(s) Oral four times a day    MEDICATIONS  (PRN):  ALBUTerol    90 MICROgram(s) HFA Inhaler 1 Puff(s) Inhalation every 4 hours PRN Shortness of Breath and/or Wheezing      No Known Allergies      Social History:  Tobacco: former smoker  EtOH: denies  Recreational drug use: denies  Lives with: alone at home  Ambulates: independent  ADLs: significant decline past 2 months, now family goes to house everyday to help  Vaccinations: Covid 2/2 completed 4/14 (28 Apr 2021 19:49)      REVIEW OF SYSTEMS: "I feel fine"  CONSTITUTIONAL: No fever, No chills, No fatigue, No myalgia, No Body ache, No Weakness  EYES: No eye pain,  No visual disturbances, No discharge, NO Redness  ENMT:  No ear pain, No nose bleed, No vertigo; No sinus or throat pain, No Congestion  NECK: No pain, No stiffness  RESPIRATORY: No cough, wheezing, No  hemoptysis, No shortness of breath  CARDIOVASCULAR: No chest pain, palpitations  GASTROINTESTINAL: No abdominal or epigastric pain. No nausea, No vomiting; No diarrhea or constipation. [  ] BM  GENITOURINARY: No dysuria, No frequency, No urgency, No hematuria, or incontinence  NEUROLOGICAL: No headaches, No dizziness, No numbness, No tingling, No tremors, No weakness  EXT: No Swelling, No Pain, No Edema  SKIN:  [ x ] No itching, burning, rashes, or lesions   MUSCULOSKELETAL: No joint pain or swelling; No muscle pain, No back pain, No extremity pain  PSYCHIATRIC: No depression, anxiety, mood swings or difficulty sleeping at night  PAIN SCALE: [x  ] None  [  ] Other-  ROS unreliable due to - [ x ] Dementia  [  ] Lethargy  [  ] Sedated [  ] non verbal  REST OF REVIEW Of SYSTEM - [ x ] Normal     Vital Signs Last 24 Hrs  T(C): 36.4 (01 May 2021 08:09), Max: 36.5 (30 Apr 2021 19:01)  T(F): 97.6 (01 May 2021 08:09), Max: 97.7 (30 Apr 2021 19:01)  HR: 62 (01 May 2021 08:09) (57 - 74)  BP: 136/81 (01 May 2021 08:09) (109/58 - 157/79)  BP(mean): --  RR: 16 (01 May 2021 08:09) (16 - 18)  SpO2: 96% (01 May 2021 08:09) (95% - 98%)    CAPILLARY BLOOD GLUCOSE          I&O's Summary    PHYSICAL EXAM:  GENERAL:  [ x ] NAD , [ x ] well appearing, [  ] Agitated, [  ] Drowsy,  [  ] Lethargy, [ x ] confused   HEAD:  [ x ] Normal, [  ] Other  EYES:  [x  ] EOMI, [ x ] PERRLA, [x  ] conjunctiva and sclera clear normal, [  ] Other,  [  ] Pallor,[  ] Discharge  ENMT:  [ x ] Normal, [x  ] Moist mucous membranes, [  ] Good dentition, [ x ] No Thrush  NECK:  [ x ] Supple, [ x ] No JVD, [ x ] Normal thyroid, [  ] Lymphadenopathy [  ] Other  CHEST/LUNG:  [ x ] Clear to auscultation bilaterally, [ x ] Breath Sounds Decrease,  [x  ] No rales, [ x ] No rhonchi  [ x ]  No wheezing  HEART:  [ x ] Regular rate and rhythm, [  ] tachycardia, [  ] Bradycardia,  [  ] irregular  [ x ] No murmurs, No rubs, No gallops, [  ] PPM in place (Mfr:  )  ABDOMEN:  [x  ] Soft, [ x ] Nontender, [ x ] Nondistended, [ x ] No mass, [ x ] Bowel sounds present, [  ] obese  NERVOUS SYSTEM:  [ x ] Alert & Oriented X1, [ x ] Nonfocal  [ x ] Confusion  [  ] Encephalopathic [  ] Sedated [  ] Unable to assess, [ x ] Other-Dementia  EXTREMITIES: [ x ] 2+ Peripheral Pulses, No clubbing, No cyanosis,  [  ] edema B/L lower EXT. [  ] PVD stasis skin changes B/L Lower EXT  LYMPH: No lymphadenopathy noted  SKIN:  [ x ] No rashes or lesions, [  ] Pressure Ulcers, [  ] ecchymosis, [  ] Skin Tears, [  ] Other    DIET: Diet, Soft (04-28-21 @ 20:31)      LABS:                        13.6   4.74  )-----------( 210      ( 01 May 2021 08:23 )             40.1     01 May 2021 08:23    144    |  111    |  10     ----------------------------<  86     4.0     |  30     |  0.67     Ca    8.8        01 May 2021 08:23  Phos  4.1       01 May 2021 08:23  Mg     2.3       01 May 2021 08:23              CARDIAC MARKERS ( 28 Apr 2021 15:07 )  <.015 ng/mL / x     / x     / x     / x             Anemia Panel:  Vitamin B12, Serum: 427 pg/mL (04-29-21 @ 08:57)  Folate, Serum: 18.4 ng/mL (04-29-21 @ 08:57)      Thyroid Panel:  T4, Serum: 5.7 ug/dL (04-29-21 @ 08:57)  Thyroid Stimulating Hormone, Serum: 1.50 uIU/mL (04-29-21 @ 07:25)            Serum Pro-Brain Natriuretic Peptide: 87 pg/mL (04-28-21 @ 15:07)      RADIOLOGY & ADDITIONAL TESTS:      HEALTH ISSUES - PROBLEM Dx:  Pulmonary embolism, bilateral    Dementia    HTN (hypertension)    Seasonal allergies    Asthma    Need for prophylactic measure          Consultant(s) Notes Reviewed:  [  ] YES     Care Discussed with [ x ] Consultants, [  ] Patient, [  ] Family, [  ] HCP, [  ] , [  ] Social Service, [  ] RN, [  ] Physical Therapy, [  ] Palliative Care Team  DVT PPX: [  ] Lovenox, [  ] SC Heparin, [  ] Coumadin, [  ] Xarelto, [  ] Eliquis, [  ] Pradaxa, [  ] IV Heparin drip, [  ] SCD, [  ] Ambulation, [  ] Contraindicated 2/2 GI Bleed, [  ] Contraindicated 2/2  Bleed, [  ] Contraindicated 2/2 Brain Bleed  Advanced Directive: [  ] None, [  ] DNR/DNI

## 2021-05-02 LAB
ANION GAP SERPL CALC-SCNC: 9 MMOL/L — SIGNIFICANT CHANGE UP (ref 5–17)
BUN SERPL-MCNC: 20 MG/DL — SIGNIFICANT CHANGE UP (ref 7–23)
CALCIUM SERPL-MCNC: 8.2 MG/DL — LOW (ref 8.5–10.1)
CHLORIDE SERPL-SCNC: 109 MMOL/L — HIGH (ref 96–108)
CO2 SERPL-SCNC: 25 MMOL/L — SIGNIFICANT CHANGE UP (ref 22–31)
CREAT SERPL-MCNC: 0.88 MG/DL — SIGNIFICANT CHANGE UP (ref 0.5–1.3)
GLUCOSE SERPL-MCNC: 84 MG/DL — SIGNIFICANT CHANGE UP (ref 70–99)
HCT VFR BLD CALC: 36.8 % — SIGNIFICANT CHANGE UP (ref 34.5–45)
HGB BLD-MCNC: 12.4 G/DL — SIGNIFICANT CHANGE UP (ref 11.5–15.5)
MAGNESIUM SERPL-MCNC: 2.3 MG/DL — SIGNIFICANT CHANGE UP (ref 1.6–2.6)
MCHC RBC-ENTMCNC: 32 PG — SIGNIFICANT CHANGE UP (ref 27–34)
MCHC RBC-ENTMCNC: 33.7 GM/DL — SIGNIFICANT CHANGE UP (ref 32–36)
MCV RBC AUTO: 95.1 FL — SIGNIFICANT CHANGE UP (ref 80–100)
NRBC # BLD: 0 /100 WBCS — SIGNIFICANT CHANGE UP (ref 0–0)
PHOSPHATE SERPL-MCNC: 4.6 MG/DL — HIGH (ref 2.5–4.5)
PLATELET # BLD AUTO: 187 K/UL — SIGNIFICANT CHANGE UP (ref 150–400)
POTASSIUM SERPL-MCNC: 3.6 MMOL/L — SIGNIFICANT CHANGE UP (ref 3.5–5.3)
POTASSIUM SERPL-SCNC: 3.6 MMOL/L — SIGNIFICANT CHANGE UP (ref 3.5–5.3)
RBC # BLD: 3.87 M/UL — SIGNIFICANT CHANGE UP (ref 3.8–5.2)
RBC # FLD: 13.4 % — SIGNIFICANT CHANGE UP (ref 10.3–14.5)
SODIUM SERPL-SCNC: 143 MMOL/L — SIGNIFICANT CHANGE UP (ref 135–145)
WBC # BLD: 5.71 K/UL — SIGNIFICANT CHANGE UP (ref 3.8–10.5)
WBC # FLD AUTO: 5.71 K/UL — SIGNIFICANT CHANGE UP (ref 3.8–10.5)

## 2021-05-02 RX ADMIN — APIXABAN 10 MILLIGRAM(S): 2.5 TABLET, FILM COATED ORAL at 17:43

## 2021-05-02 RX ADMIN — Medication 1 TABLET(S): at 21:42

## 2021-05-02 RX ADMIN — LOSARTAN POTASSIUM 50 MILLIGRAM(S): 100 TABLET, FILM COATED ORAL at 05:56

## 2021-05-02 RX ADMIN — PANTOPRAZOLE SODIUM 40 MILLIGRAM(S): 20 TABLET, DELAYED RELEASE ORAL at 05:53

## 2021-05-02 RX ADMIN — Medication 1 TABLET(S): at 17:43

## 2021-05-02 RX ADMIN — LORATADINE 10 MILLIGRAM(S): 10 TABLET ORAL at 11:32

## 2021-05-02 RX ADMIN — Medication 1 TABLET(S): at 11:33

## 2021-05-02 RX ADMIN — Medication 3 MILLIGRAM(S): at 21:42

## 2021-05-02 RX ADMIN — APIXABAN 10 MILLIGRAM(S): 2.5 TABLET, FILM COATED ORAL at 05:52

## 2021-05-02 RX ADMIN — Medication 1 TABLET(S): at 05:53

## 2021-05-02 NOTE — PROGRESS NOTE ADULT - SUBJECTIVE AND OBJECTIVE BOX
Neurology Follow up note    ANIRUDH RICHARDSON80yFemale    HPI:  79 yo female PMH dementia, HTN, asthma controlled, seasonal allergies presents to ED c/o SOB. Onset this AM. Hx obtained from daughter Yanelis Fraire as patient has hx of dementia with significant mental decline for past 1-2 months. Daughter states on Monday AM patient went to neighbors house stating she was SOB. Neighbor called the ambulance and patient was transferred to [facility] and diagnosed with Covid,+ Rxed  with Regen, was observation  daughter does not know how patient got covid as patient is largely home bound with no visitors. Patient was d/c the same day (4/26) with albuterol inhaler and Losartan as pt was hypertensive. This AM, patient had similar episode where she went to [facility] and c/o SOB, so neighbor called ambulance and patient was transferred to [place]. Patient seen and examined at bedside, is a poor historian, A&Ox1 (person). Patient states she was SOB this AM but does not know why she is here. C/o chest tightness but denies cough, wheezing, CP, palpitations, fevers, chills, nausea, abd pain. When asked if patient was in hospital on Monday, she replied "I suppose so." Per daughter, patient with significant mental decline since March. Patient received the Pfizer  Covid vaccine  3/26   and since then has had a progressive decline in mental status 2nd dose Vaccine was given 4/16 , Prior to March patient was A&Ox3 and independent, however since then patient has had increased confusion, found wandering to [facility]. Patient does not have a PCP, has seen Dr. Clements once for pre-op clearance for cataract surgery 1 year ago. Daughter states they have been trying to make an appointment with a neurologist for her cognitive decline but have been unable to do so. Of note, patient has a history of hypotension and has never been on anti-hypertensive medications until Monday. Daughter states she was very surprised that her mother was found to be hypertensive and prescribed Losartan. Patient was given one dose yesterday.     In ED:  Vitals: T 98, HR 63, /76, RR 18, SpO2 100% on RA  Labs significant for: WBC 3.77, D-dimer 672, Cl 112, anion gap 2, AST 12, ALT 11  CTA: + acute bilateral pulmonary emboli, no evidence of R heart strain  CT Head: Small vessel ischemic changes in both hemispheres with volume loss and involutional change. No acute infarct or hemorrhagic lesion noted. Right frontal meningioma measuring approximately 1.1 cm.  EKG: NSR, rate 62, T wave abnormality   Given: 1L NS bolus x1, Albuterol inhaler x1, Lovenox 50mg x1  COVID 19-PCR -NEG       (28 Apr 2021 19:49)      Interval History - no new events    Patient is seen, chart was reviewed and case was discussed with the treatment team.  Pt is not in any distress.   Lying on bed comfortably.     Vital Signs Last 24 Hrs  T(C): 36.4 (02 May 2021 04:40), Max: 36.7 (01 May 2021 19:26)  T(F): 97.6 (02 May 2021 04:40), Max: 98 (01 May 2021 19:26)  HR: 63 (02 May 2021 04:40) (63 - 69)  BP: 115/66 (02 May 2021 04:40) (109/78 - 115/66)  BP(mean): --  RR: 18 (02 May 2021 04:40) (17 - 18)  SpO2: 97% (02 May 2021 04:40) (97% - 98%)        REVIEW OF SYSTEMS:    Constitutional: No fever,   Eyes: No eye pain, visual disturbances, or discharge  ENT:  No difficulty hearing, tinnitus, vertigo; No sinus or throat pain  Neck: No pain or stiffness  Respiratory: No , wheezing, chills or hemoptysis  Cardiovascular: No chest pain, palpitations, s  Gastrointestinal: No abdominal or epigastric pain. No nausea, vomiting or hematemesis;   Genitourinary: No dysuria, frequency, hematuria or incontinence  Neurological: No headaches,   Psychiatric: No d, mood swings or difficulty sleeping  Musculoskeletal: No joint pain or swelling; No muscle, back or extremity pain  Skin: No itching, burning, rashes or lesions   Lymph Nodes: No enlarged glands  Endocrine: No heat or cold intolerance;  Allergy and Immunologic: No hives or eczema    On Neurological Examination:    Mental Status - Pt is alert, awake, oriented X1  . Follows commands well and able to answer questions appropriately    Speech -  Normal.    Cranial Nerves - Pupils 3 mm equal and reactive to light, extraocular eye movements intact. Pt has no visual field deficit.  Pt has no facial asymmetry. Facial sensation is intact.Tongue - is in midline.    Muscle tone - is normal     Motor Exam - 4+/5 all over,   No drift. No shaking or tremors.    Sensory Exam . Pt withdraws all extremities equally on stimulation. No asymmetry seen. No complaints of tingling, numbness.        coordination:    Finger to nose: normal    Heel to shin: normal    Deep tendon Reflexes - 2 plus all over.        Romberg - Negative.    Neck Supple -  Yes.     MEDICATIONS    ALBUTerol    90 MICROgram(s) HFA Inhaler 1 Puff(s) Inhalation every 4 hours PRN  apixaban 10 milliGRAM(s) Oral every 12 hours  loratadine 10 milliGRAM(s) Oral daily  losartan 50 milliGRAM(s) Oral daily  melatonin 3 milliGRAM(s) Oral at bedtime  pantoprazole    Tablet 40 milliGRAM(s) Oral before breakfast  potassium phosphate / sodium phosphate Tablet (K-PHOS No. 2) 1 Tablet(s) Oral four times a day      Allergies    No Known Allergies    Intolerances               05-02    143  |  109<H>  |  20  ----------------------------<  84  3.6   |  25  |  0.88    Ca    8.2<L>      02 May 2021 06:40  Phos  4.6     05-02  Mg     2.3     05-02            Hemoglobin A1C:     Vitamin B12     RADIOLOGY    ASSESSMENT AND PLAN:      seen for ams-  metabolic encephalopathy  dementia  PE    dementia work up is negative   on AC  Physical therapy evaluation.  OOB to chair/ambulation with assistance only.  Advanced care planning was discussed with family.  Pain is accessed and addressed.

## 2021-05-02 NOTE — PROGRESS NOTE ADULT - SUBJECTIVE AND OBJECTIVE BOX
Date/Time Patient Seen:  		  Referring MD:   Data Reviewed	       Patient is a 80y old  Female who presents with a chief complaint of SOB (30 Apr 2021 16:33)      Subjective/HPI     PAST MEDICAL & SURGICAL HISTORY:  No pertinent past medical history    Uncomplicated asthma, unspecified asthma severity  past history - currently stable with no medications    HTN (hypertension)    No significant past surgical history    S/P cataract surgery    History of partial hysterectomy          Medication list         MEDICATIONS  (STANDING):  apixaban 10 milliGRAM(s) Oral every 12 hours  loratadine 10 milliGRAM(s) Oral daily  losartan 50 milliGRAM(s) Oral daily  melatonin 3 milliGRAM(s) Oral at bedtime  pantoprazole    Tablet 40 milliGRAM(s) Oral before breakfast  potassium phosphate / sodium phosphate Tablet (K-PHOS No. 2) 1 Tablet(s) Oral four times a day    MEDICATIONS  (PRN):  ALBUTerol    90 MICROgram(s) HFA Inhaler 1 Puff(s) Inhalation every 4 hours PRN Shortness of Breath and/or Wheezing         Vitals log        ICU Vital Signs Last 24 Hrs  T(C): 36.4 (02 May 2021 04:40), Max: 36.7 (01 May 2021 19:26)  T(F): 97.6 (02 May 2021 04:40), Max: 98 (01 May 2021 19:26)  HR: 63 (02 May 2021 04:40) (62 - 69)  BP: 115/66 (02 May 2021 04:40) (109/78 - 136/81)  BP(mean): --  ABP: --  ABP(mean): --  RR: 18 (02 May 2021 04:40) (16 - 18)  SpO2: 97% (02 May 2021 04:40) (96% - 98%)           Input and Output:  I&O's Detail    01 May 2021 07:01  -  02 May 2021 06:06  --------------------------------------------------------  IN:    Oral Fluid: 240 mL  Total IN: 240 mL    OUT:  Total OUT: 0 mL    Total NET: 240 mL          Lab Data                        13.6   4.74  )-----------( 210      ( 01 May 2021 08:23 )             40.1     05-01    144  |  111<H>  |  10  ----------------------------<  86  4.0   |  30  |  0.67    Ca    8.8      01 May 2021 08:23  Phos  4.1     05-01  Mg     2.3     05-01              Review of Systems	      Objective     Physical Examination    heart s1s2  lung dc BS  abd soft  head nc  head at    Pertinent Lab findings & Imaging      Marta:  NO   Adequate UO     I&O's Detail    01 May 2021 07:01  -  02 May 2021 06:06  --------------------------------------------------------  IN:    Oral Fluid: 240 mL  Total IN: 240 mL    OUT:  Total OUT: 0 mL    Total NET: 240 mL               Discussed with:     Cultures:	        Radiology

## 2021-05-02 NOTE — PROGRESS NOTE ADULT - ATTENDING COMMENTS
81 yo female PMH dementia, HTN, asthma controlled, seasonal allergies presents to ED c/o SOB, onset this AM. Patient discharged from [facility] on Monday 4/16 and diagnosed with Covid at that time. Patient admitted for acute bilat pulmonary emboli found on CTA.  pt seen, examined, case & care plan d/w, pt 's Dtr, residents at detail.  D/W Dr Bah follow up  D/W dtr today , PT----> BENJAMIN d/c planning  NO Need for isolation as d/w DR Yen.  PO diet  D/W Social service ----> Awaiting Auth. BENJAMIN  Total care time is 40 minutes.

## 2021-05-02 NOTE — PROGRESS NOTE ADULT - SUBJECTIVE AND OBJECTIVE BOX
Patient is a 80y old  Female who presents with a chief complaint of SOB (30 Apr 2021 16:33)      HPI:  79 yo female PMH dementia, HTN, asthma controlled, seasonal allergies presents to ED c/o SOB. Onset this AM. Hx obtained from daughter Yanelis Fraire as patient has hx of dementia with significant mental decline for past 1-2 months. Daughter states on Monday AM patient went to [facility] stating she was SOB. Neighbor called the ambulance and patient was transferred to [facility] and diagnosed with Covid,+ Rxed  with Regen, was observation  daughter does not know how patient got covid as patient is largely home bound with no visitors. Patient was d/c the same day (4/26) with albuterol inhaler and Losartan as pt was hypertensive. This AM, patient had similar episode where she went to [facility] and c/o SOB, so neighbor called ambulance and patient was transferred to [place]. Patient seen and examined at bedside, is a poor historian, A&Ox1 (person). Patient states she was SOB this AM but does not know why she is here. C/o chest tightness but denies cough, wheezing, CP, palpitations, fevers, chills, nausea, abd pain. When asked if patient was in hospital on Monday, she replied "I suppose so." Per daughter, patient with significant mental decline since March. Patient received the Pfizer  Covid vaccine  3/26   and since then has had a progressive decline in mental status 2nd dose Vaccine was given 4/16 , Prior to March patient was A&Ox3 and independent, however since then patient has had increased confusion, found wandering to [facility]. Patient does not have a PCP, has seen Dr. Clements once for pre-op clearance for cataract surgery 1 year ago. Daughter states they have been trying to make an appointment with a neurologist for her cognitive decline but have been unable to do so. Of note, patient has a history of hypotension and has never been on anti-hypertensive medications until Monday. Daughter states she was very surprised that her mother was found to be hypertensive and prescribed Losartan. Patient was given one dose yesterday.     In ED:  Vitals: T 98, HR 63, /76, RR 18, SpO2 100% on RA  Labs significant for: WBC 3.77, D-dimer 672, Cl 112, anion gap 2, AST 12, ALT 11  CTA: + acute bilateral pulmonary emboli, no evidence of R heart strain  CT Head: Small vessel ischemic changes in both hemispheres with volume loss and involutional change. No acute infarct or hemorrhagic lesion noted. Right frontal meningioma measuring approximately 1.1 cm.  EKG: NSR, rate 62, T wave abnormality   Given: 1L NS bolus x1, Albuterol inhaler x1, Lovenox 50mg x1  COVID 19-PCR -NEG       (28 Apr 2021 19:49)      INTERVAL HPI:  4/29/21: Pt admitted overnight for acute b/l PE. Pt seen and examined. Pt A&Ox1, unsure of why she is here. Pleasant, on RA and denies SOB. Has no acute complaints this AM. On lovenox 50 mg daily for PE. CXR no acute pulm disease, US duplex no dvt b/l. F/u TTE.   4/30/21: No acute overnight events. Per RN, pt did not sleep much throughout the night. Confused, tries to get out of bed but is redirectable and pleasant. Pt seen and examined, pt outside by nursing station as she keeps trying to climb out of bed. Pt confused, A&Ox1, but pleasant. She has no acute complaints. She is tolerating room air. Pt switched to eliquis 10 mg bid. TTE with no heart strain. PT recommending BENJAMIN.   5/1/21: No acute overnight events. Patient is very confused, but redirectable and pleasant. Mildly somnolent but responds to verbal and physical stimuli. A&Ox0-1 but pleasant. Tolerating room air. On Eliquis 10 mg BID. PT recommending BENJAMIN.  5/2/21: No acute overnight events. Pt is confused but alert, very pleasant and re-directable. Tolerating room air, on eliquis 10 mg BID. PT recommending BENJAMIN, pending SW.      OVERNIGHT EVENTS: none     Home Medications:  losartan 50 mg oral tablet: 1 tab(s) orally once a day (28 Apr 2021 19:49)      MEDICATIONS  (STANDING):  apixaban 10 milliGRAM(s) Oral every 12 hours  loratadine 10 milliGRAM(s) Oral daily  losartan 50 milliGRAM(s) Oral daily  melatonin 3 milliGRAM(s) Oral at bedtime  pantoprazole    Tablet 40 milliGRAM(s) Oral before breakfast  potassium phosphate / sodium phosphate Tablet (K-PHOS No. 2) 1 Tablet(s) Oral four times a day    MEDICATIONS  (PRN):  ALBUTerol    90 MICROgram(s) HFA Inhaler 1 Puff(s) Inhalation every 4 hours PRN Shortness of Breath and/or Wheezing      Allergies    No Known Allergies    Intolerances        REVIEW OF SYSTEMS: "I feel good"  CONSTITUTIONAL: No fever, No chills, No fatigue, No myalgia, No Body ache, No Weakness  EYES: No eye pain,  No visual disturbances, No discharge, NO Redness  ENMT:  No ear pain, No nose bleed, No vertigo; No sinus or throat pain, No Congestion  NECK: No pain, No stiffness  RESPIRATORY: No cough, wheezing, No  hemoptysis, No shortness of breath  CARDIOVASCULAR: No chest pain, palpitations  GASTROINTESTINAL: No abdominal or epigastric pain. No nausea, No vomiting; No diarrhea or constipation. [  ] BM  GENITOURINARY: No dysuria, No frequency, No urgency, No hematuria, or incontinence  NEUROLOGICAL: No headaches, No dizziness, No numbness, No tingling, No tremors, No weakness  EXT: No Swelling, No Pain, No Edema  SKIN:  [ x ] No itching, burning, rashes, or lesions   MUSCULOSKELETAL: No joint pain or swelling; No muscle pain, No back pain, No extremity pain  PSYCHIATRIC: No depression, anxiety, mood swings or difficulty sleeping at night  PAIN SCALE: [x  ] None  [  ] Other-  ROS unreliable due to - [ x ] Dementia  [  ] Lethargy  [  ] Sedated [  ] non verbal  REST OF REVIEW Of SYSTEM - [ x ] Normal     Vital Signs Last 24 Hrs  T(C): 36.6 (02 May 2021 13:02), Max: 36.7 (01 May 2021 19:26)  T(F): 97.9 (02 May 2021 13:02), Max: 98 (01 May 2021 19:26)  HR: 66 (02 May 2021 13:02) (63 - 69)  BP: 102/61 (02 May 2021 13:02) (102/61 - 115/66)  BP(mean): --  RR: 17 (02 May 2021 13:02) (17 - 18)  SpO2: 97% (02 May 2021 13:02) (97% - 98%)  Finger Stick        05-01 @ 07:01  -  05-02 @ 07:00  --------------------------------------------------------  IN: 240 mL / OUT: 0 mL / NET: 240 mL        PHYSICAL EXAM:  GENERAL:  [ x ] NAD , [ x ] well appearing, [  ] Agitated, [  ] Drowsy,  [  ] Lethargy, [ x ] confused   HEAD:  [ x ] Normal, [  ] Other  EYES:  [x  ] EOMI, [ x ] PERRLA, [x  ] conjunctiva and sclera clear normal, [  ] Other,  [  ] Pallor,[  ] Discharge  ENMT:  [ x ] Normal, [x  ] Moist mucous membranes, [  ] Good dentition, [ x ] No Thrush  NECK:  [ x ] Supple, [ x ] No JVD, [ x ] Normal thyroid, [  ] Lymphadenopathy [  ] Other  CHEST/LUNG:  [ x ] Clear to auscultation bilaterally, [ x ] Breath Sounds Decrease,  [x  ] No rales, [ x ] No rhonchi  [ x ]  No wheezing  HEART:  [ x ] Regular rate and rhythm, [  ] tachycardia, [  ] Bradycardia,  [  ] irregular  [ x ] No murmurs, No rubs, No gallops, [  ] PPM in place (Mfr:  )  ABDOMEN:  [x  ] Soft, [ x ] Nontender, [ x ] Nondistended, [ x ] No mass, [ x ] Bowel sounds present, [  ] obese  NERVOUS SYSTEM:  [ x ] Alert & Oriented X1, [ x ] Nonfocal  [ x ] Confusion  [  ] Encephalopathic [  ] Sedated [  ] Unable to assess, [ x ] Other-Dementia  EXTREMITIES: [ x ] 2+ Peripheral Pulses, No clubbing, No cyanosis,  [  ] edema B/L lower EXT. [  ] PVD stasis skin changes B/L Lower EXT  LYMPH: No lymphadenopathy noted  SKIN:  [ x ] No rashes or lesions, [  ] Pressure Ulcers, [  ] ecchymosis, [  ] Skin Tears, [  ] Other    DIET: Diet, Soft (04-28-21 @ 20:31) [Active]      LABS:                        12.4   5.71  )-----------( 187      ( 02 May 2021 06:40 )             36.8     02 May 2021 06:40    143    |  109    |  20     ----------------------------<  84     3.6     |  25     |  0.88     Ca    8.2        02 May 2021 06:40  Phos  4.6       02 May 2021 06:40  Mg     2.3       02 May 2021 06:40                    CARDIAC MARKERS ( 28 Apr 2021 15:07 )  <.015 ng/mL / x     / x     / x     / x                 Anemia Panel:  Vitamin B12, Serum: 427 pg/mL (04-29-21 @ 08:57)  Folate, Serum: 18.4 ng/mL (04-29-21 @ 08:57)      Thyroid Panel:  T4, Serum: 5.7 ug/dL (04-29-21 @ 08:57)  Thyroid Stimulating Hormone, Serum: 1.50 uIU/mL (04-29-21 @ 07:25)            Serum Pro-Brain Natriuretic Peptide: 87 pg/mL (04-28-21 @ 15:07)      RADIOLOGY & ADDITIONAL TESTS: none in past 24 hr      HEALTH ISSUES - PROBLEM Dx:  Pulmonary embolism, bilateral    Seasonal allergies    Need for prophylactic measure    HTN (hypertension)    Asthma    Dementia            Consultant(s) Notes Reviewed:  [ x ] YES     Care Discussed with [ x ] Consultants, [  ] Patient, [ x ] Family, [  ] HCP, [  ] , [  ] Social Service, [  ] RN, [  ] Physical Therapy, [  ] Palliative Care Team  DVT PPX: [  ] Lovenox, [  ] SC Heparin, [  ] Coumadin, [  ] Xarelto, [x  ] Eliquis, [  ] Pradaxa, [  ] IV Heparin drip, [  ] SCD, [  ] Ambulation, [  ] Contraindicated 2/2 GI Bleed, [  ] Contraindicated 2/2  Bleed, [  ] Contraindicated 2/2 Brain Bleed  Advanced Directive: [  ] None, [x  ] DNR/DNI

## 2021-05-02 NOTE — PROGRESS NOTE ADULT - NSICDXPROBLEMPLAN1_GEN_ALL_CORE_FT
-CTA showing acute bilateral pulmonary emboli, no evidence of R heart strain   -EKG showing NSR, rate 62, t wave abnormality, no signs of R heart strain  -TTE shows no heart strain  -Continue to monitor on tele with cont pulse ox, pt on RA  -Continue with eliquis 10 mg BID  -B/l dopplers lower extremities neg for DVT   -CXR neg for active pulm disease   -Monitor for signs/symptoms of bleeding  -Pulm Dr. Spangler consulted  -Heme Dr. Bah d/w [facility] records reviewd , pt was COVID + 4/26, s/p monoclonal AB given , Pt has been Fully Vaccinated 2 inj completed. pt's initial SOB was 1 week prior to [facility]. Per ID, pt no longer contagious -CTA showing acute bilateral pulmonary emboli, no evidence of R heart strain   -EKG showing NSR, rate 62, t wave abnormality, no signs of R heart strain  -TTE shows no heart strain  -Continue to monitor on tele with cont pulse ox, pt on RA  -Continue with eliquis 10 mg BID, loading dose followed   -B/l dopplers lower extremities neg for DVT   -CXR neg for active pulm disease   -Monitor for signs/symptoms of bleeding  -Pulm Dr. Spangler consulted  -Heme Dr. Bah d/w [facility] records reviewd , pt was COVID + 4/26, s/p monoclonal AB given , Pt has been Fully Vaccinated 2 inj completed. pt's initial SOB was 1 week prior to [facility]. Per ID, pt no longer contagious

## 2021-05-03 LAB
ANION GAP SERPL CALC-SCNC: 4 MMOL/L — LOW (ref 5–17)
BUN SERPL-MCNC: 19 MG/DL — SIGNIFICANT CHANGE UP (ref 7–23)
CALCIUM SERPL-MCNC: 8.6 MG/DL — SIGNIFICANT CHANGE UP (ref 8.5–10.1)
CHLORIDE SERPL-SCNC: 112 MMOL/L — HIGH (ref 96–108)
CO2 SERPL-SCNC: 28 MMOL/L — SIGNIFICANT CHANGE UP (ref 22–31)
CREAT SERPL-MCNC: 0.74 MG/DL — SIGNIFICANT CHANGE UP (ref 0.5–1.3)
GLUCOSE SERPL-MCNC: 81 MG/DL — SIGNIFICANT CHANGE UP (ref 70–99)
HCT VFR BLD CALC: 36.2 % — SIGNIFICANT CHANGE UP (ref 34.5–45)
HGB BLD-MCNC: 11.9 G/DL — SIGNIFICANT CHANGE UP (ref 11.5–15.5)
MAGNESIUM SERPL-MCNC: 2.4 MG/DL — SIGNIFICANT CHANGE UP (ref 1.6–2.6)
MCHC RBC-ENTMCNC: 31.6 PG — SIGNIFICANT CHANGE UP (ref 27–34)
MCHC RBC-ENTMCNC: 32.9 GM/DL — SIGNIFICANT CHANGE UP (ref 32–36)
MCV RBC AUTO: 96 FL — SIGNIFICANT CHANGE UP (ref 80–100)
NRBC # BLD: 0 /100 WBCS — SIGNIFICANT CHANGE UP (ref 0–0)
PHOSPHATE SERPL-MCNC: 3.8 MG/DL — SIGNIFICANT CHANGE UP (ref 2.5–4.5)
PLATELET # BLD AUTO: 190 K/UL — SIGNIFICANT CHANGE UP (ref 150–400)
POTASSIUM SERPL-MCNC: 3.8 MMOL/L — SIGNIFICANT CHANGE UP (ref 3.5–5.3)
POTASSIUM SERPL-SCNC: 3.8 MMOL/L — SIGNIFICANT CHANGE UP (ref 3.5–5.3)
RBC # BLD: 3.77 M/UL — LOW (ref 3.8–5.2)
RBC # FLD: 13.3 % — SIGNIFICANT CHANGE UP (ref 10.3–14.5)
SARS-COV-2 RNA SPEC QL NAA+PROBE: DETECTED
SODIUM SERPL-SCNC: 144 MMOL/L — SIGNIFICANT CHANGE UP (ref 135–145)
WBC # BLD: 4.87 K/UL — SIGNIFICANT CHANGE UP (ref 3.8–10.5)
WBC # FLD AUTO: 4.87 K/UL — SIGNIFICANT CHANGE UP (ref 3.8–10.5)

## 2021-05-03 RX ADMIN — Medication 1 TABLET(S): at 17:46

## 2021-05-03 RX ADMIN — Medication 1 TABLET(S): at 06:33

## 2021-05-03 RX ADMIN — APIXABAN 10 MILLIGRAM(S): 2.5 TABLET, FILM COATED ORAL at 17:46

## 2021-05-03 RX ADMIN — Medication 1 TABLET(S): at 22:39

## 2021-05-03 RX ADMIN — APIXABAN 10 MILLIGRAM(S): 2.5 TABLET, FILM COATED ORAL at 06:33

## 2021-05-03 RX ADMIN — LOSARTAN POTASSIUM 50 MILLIGRAM(S): 100 TABLET, FILM COATED ORAL at 06:33

## 2021-05-03 RX ADMIN — Medication 1 TABLET(S): at 12:29

## 2021-05-03 RX ADMIN — LORATADINE 10 MILLIGRAM(S): 10 TABLET ORAL at 12:29

## 2021-05-03 RX ADMIN — PANTOPRAZOLE SODIUM 40 MILLIGRAM(S): 20 TABLET, DELAYED RELEASE ORAL at 06:33

## 2021-05-03 RX ADMIN — Medication 3 MILLIGRAM(S): at 22:39

## 2021-05-03 NOTE — PROGRESS NOTE ADULT - SUBJECTIVE AND OBJECTIVE BOX
Patient is a 80y old  Female who presents with a chief complaint of SOB (30 Apr 2021 16:33)      HPI:  81 yo female PMH dementia, HTN, asthma controlled, seasonal allergies presents to ED c/o SOB. Onset this AM. Hx obtained from daughter Yanelis Fraire as patient has hx of dementia with significant mental decline for past 1-2 months. Daughter states on Monday AM patient went to [facility] stating she was SOB. Neighbor called the ambulance and patient was transferred to [facility] and diagnosed with Covid,+ Rxed  with Regen, was observation  daughter does not know how patient got covid as patient is largely home bound with no visitors. Patient was d/c the same day (4/26) with albuterol inhaler and Losartan as pt was hypertensive. This AM, patient had similar episode where she went to [facility] and c/o SOB, so neighbor called ambulance and patient was transferred to [place]. Patient seen and examined at bedside, is a poor historian, A&Ox1 (person). Patient states she was SOB this AM but does not know why she is here. C/o chest tightness but denies cough, wheezing, CP, palpitations, fevers, chills, nausea, abd pain. When asked if patient was in hospital on Monday, she replied "I suppose so." Per daughter, patient with significant mental decline since March. Patient received the Pfizer  Covid vaccine  3/26   and since then has had a progressive decline in mental status 2nd dose Vaccine was given 4/16 , Prior to March patient was A&Ox3 and independent, however since then patient has had increased confusion, found wandering to [facility]. Patient does not have a PCP, has seen Dr. Clements once for pre-op clearance for cataract surgery 1 year ago. Daughter states they have been trying to make an appointment with a neurologist for her cognitive decline but have been unable to do so. Of note, patient has a history of hypotension and has never been on anti-hypertensive medications until Monday. Daughter states she was very surprised that her mother was found to be hypertensive and prescribed Losartan. Patient was given one dose yesterday.     In ED:  Vitals: T 98, HR 63, /76, RR 18, SpO2 100% on RA  Labs significant for: WBC 3.77, D-dimer 672, Cl 112, anion gap 2, AST 12, ALT 11  CTA: + acute bilateral pulmonary emboli, no evidence of R heart strain  CT Head: Small vessel ischemic changes in both hemispheres with volume loss and involutional change. No acute infarct or hemorrhagic lesion noted. Right frontal meningioma measuring approximately 1.1 cm.  EKG: NSR, rate 62, T wave abnormality   Given: 1L NS bolus x1, Albuterol inhaler x1, Lovenox 50mg x1  COVID 19-PCR -NEG       (28 Apr 2021 19:49)      INTERVAL HPI:  4/29/21: Pt admitted overnight for acute b/l PE. Pt seen and examined. Pt A&Ox1, unsure of why she is here. Pleasant, on RA and denies SOB. Has no acute complaints this AM. On lovenox 50 mg daily for PE. CXR no acute pulm disease, US duplex no dvt b/l. F/u TTE.   4/30/21: No acute overnight events. Per RN, pt did not sleep much throughout the night. Confused, tries to get out of bed but is redirectable and pleasant. Pt seen and examined, pt outside by nursing station as she keeps trying to climb out of bed. Pt confused, A&Ox1, but pleasant. She has no acute complaints. She is tolerating room air. Pt switched to eliquis 10 mg bid. TTE with no heart strain. PT recommending BENJAMIN.   5/1/21: No acute overnight events. Patient is very confused, but redirectable and pleasant. Mildly somnolent but responds to verbal and physical stimuli. A&Ox0-1 but pleasant. Tolerating room air. On Eliquis 10 mg BID. PT recommending BENJAMIN.  5/2/21: No acute overnight events. Pt is confused but alert, very pleasant and re-directable. Tolerating room air, on eliquis 10 mg BID. PT recommending BENJAMIN, pending SW.  5/3/21: No acute overnight events. Pt confused but alert, pleasant, sitting in bed comfortably eating breakfast. Tolerating room air, on eliquis 10 mg BID for PE. PT recommending BENJAMIN. Repeat COVID-19 PCR for dispo planning positive, however per ID pt day 14 of illness and does not need isolation precautions.       OVERNIGHT EVENTS: none     Home Medications:  losartan 50 mg oral tablet: 1 tab(s) orally once a day (28 Apr 2021 19:49)      MEDICATIONS  (STANDING):  apixaban 10 milliGRAM(s) Oral every 12 hours  loratadine 10 milliGRAM(s) Oral daily  losartan 50 milliGRAM(s) Oral daily  melatonin 3 milliGRAM(s) Oral at bedtime  pantoprazole    Tablet 40 milliGRAM(s) Oral before breakfast  potassium phosphate / sodium phosphate Tablet (K-PHOS No. 2) 1 Tablet(s) Oral four times a day    MEDICATIONS  (PRN):  ALBUTerol    90 MICROgram(s) HFA Inhaler 1 Puff(s) Inhalation every 4 hours PRN Shortness of Breath and/or Wheezing      Allergies    No Known Allergies    Intolerances        REVIEW OF SYSTEMS: "I feel fine this morning "  CONSTITUTIONAL: No fever, No chills, No fatigue, No myalgia, No Body ache, No Weakness  EYES: No eye pain,  No visual disturbances, No discharge, NO Redness  ENMT:  No ear pain, No nose bleed, No vertigo; No sinus or throat pain, No Congestion  NECK: No pain, No stiffness  RESPIRATORY: No cough, wheezing, No  hemoptysis, No shortness of breath  CARDIOVASCULAR: No chest pain, palpitations  GASTROINTESTINAL: No abdominal or epigastric pain. No nausea, No vomiting; No diarrhea or constipation. [  ] BM  GENITOURINARY: No dysuria, No frequency, No urgency, No hematuria, or incontinence  NEUROLOGICAL: No headaches, No dizziness, No numbness, No tingling, No tremors, No weakness  EXT: No Swelling, No Pain, No Edema  SKIN:  [ x ] No itching, burning, rashes, or lesions   MUSCULOSKELETAL: No joint pain or swelling; No muscle pain, No back pain, No extremity pain  PSYCHIATRIC: No depression, anxiety, mood swings or difficulty sleeping at night  PAIN SCALE: [x  ] None  [  ] Other-  ROS unreliable due to - [ x ] Dementia  [  ] Lethargy  [  ] Sedated [  ] non verbal  REST OF REVIEW Of SYSTEM - [ x ] Normal     Vital Signs Last 24 Hrs  T(C): 36.9 (03 May 2021 11:46), Max: 36.9 (03 May 2021 11:46)  T(F): 98.4 (03 May 2021 11:46), Max: 98.4 (03 May 2021 11:46)  HR: 71 (03 May 2021 11:46) (64 - 75)  BP: 93/53 (03 May 2021 11:46) (93/53 - 127/75)  BP(mean): --  RR: 16 (03 May 2021 11:46) (16 - 17)  SpO2: 97% (03 May 2021 11:46) (97% - 97%)  Finger Stick        05-02 @ 07:01  -  05-03 @ 07:00  --------------------------------------------------------  IN: 240 mL / OUT: 0 mL / NET: 240 mL        PHYSICAL EXAM:  GENERAL:  [ x ] NAD , [ x ] well appearing, [  ] Agitated, [  ] Drowsy,  [  ] Lethargy, [ x ] confused   HEAD:  [ x ] Normal, [  ] Other  EYES:  [x  ] EOMI, [ x ] PERRLA, [x  ] conjunctiva and sclera clear normal, [  ] Other,  [  ] Pallor,[  ] Discharge  ENMT:  [ x ] Normal, [x  ] Moist mucous membranes, [  ] Good dentition, [ x ] No Thrush  NECK:  [ x ] Supple, [ x ] No JVD, [ x ] Normal thyroid, [  ] Lymphadenopathy [  ] Other  CHEST/LUNG:  [ x ] Clear to auscultation bilaterally, [ x ] Breath Sounds Decrease,  [x  ] No rales, [ x ] No rhonchi  [ x ]  No wheezing  HEART:  [ x ] Regular rate and rhythm, [  ] tachycardia, [  ] Bradycardia,  [  ] irregular  [ x ] No murmurs, No rubs, No gallops, [  ] PPM in place (Mfr:  )  ABDOMEN:  [x  ] Soft, [ x ] Nontender, [ x ] Nondistended, [ x ] No mass, [ x ] Bowel sounds present, [  ] obese  NERVOUS SYSTEM:  [ x ] Alert & Oriented X1, [ x ] Nonfocal  [ x ] Confusion  [  ] Encephalopathic [  ] Sedated [  ] Unable to assess, [ x ] Other-Dementia  EXTREMITIES: [ x ] 2+ Peripheral Pulses, No clubbing, No cyanosis,  [  ] edema B/L lower EXT. [  ] PVD stasis skin changes B/L Lower EXT  LYMPH: No lymphadenopathy noted  SKIN:  [ x ] No rashes or lesions, [  ] Pressure Ulcers, [  ] ecchymosis, [  ] Skin Tears, [  ] Other    DIET: Diet, Soft (04-28-21 @ 20:31) [Active]      LABS:                        11.9   4.87  )-----------( 190      ( 03 May 2021 06:44 )             36.2     03 May 2021 06:44    144    |  112    |  19     ----------------------------<  81     3.8     |  28     |  0.74     Ca    8.6        03 May 2021 06:44  Phos  3.8       03 May 2021 06:44  Mg     2.4       03 May 2021 06:44                    CARDIAC MARKERS ( 28 Apr 2021 15:07 )  <.015 ng/mL / x     / x     / x     / x                 Anemia Panel:  Vitamin B12, Serum: 427 pg/mL (04-29-21 @ 08:57)  Folate, Serum: 18.4 ng/mL (04-29-21 @ 08:57)      Thyroid Panel:  T4, Serum: 5.7 ug/dL (04-29-21 @ 08:57)  Thyroid Stimulating Hormone, Serum: 1.50 uIU/mL (04-29-21 @ 07:25)            Serum Pro-Brain Natriuretic Peptide: 87 pg/mL (04-28-21 @ 15:07)      RADIOLOGY & ADDITIONAL TESTS: none in past 24 hr       HEALTH ISSUES - PROBLEM Dx:  Pulmonary embolism, bilateral    Dementia    HTN (hypertension)    Asthma    Seasonal allergies    Need for prophylactic measure            Consultant(s) Notes Reviewed:  [ x ] YES     Care Discussed with [ x ] Consultants, [  ] Patient, [ x ] Family, [  ] HCP, [  ] , [  ] Social Service, [  ] RN, [  ] Physical Therapy, [  ] Palliative Care Team  DVT PPX: [  ] Lovenox, [  ] SC Heparin, [  ] Coumadin, [  ] Xarelto, [x  ] Eliquis, [  ] Pradaxa, [  ] IV Heparin drip, [  ] SCD, [  ] Ambulation, [  ] Contraindicated 2/2 GI Bleed, [  ] Contraindicated 2/2  Bleed, [  ] Contraindicated 2/2 Brain Bleed  Advanced Directive: [  ] None, [x  ] DNR/DNI Patient is a 80y old  Female who presents with a chief complaint of SOB (30 Apr 2021 16:33)      HPI:  79 yo female PMH dementia, HTN, asthma controlled, seasonal allergies presents to ED c/o SOB. Onset this AM. Hx obtained from daughter Yanelis Fraire as patient has hx of dementia with significant mental decline for past 1-2 months. Daughter states on Monday AM patient went to [facility] stating she was SOB. Neighbor called the ambulance and patient was transferred to [facility] and diagnosed with Covid,+ Rxed  with Regen, was observation  daughter does not know how patient got covid as patient is largely home bound with no visitors. Patient was d/c the same day (4/26) with albuterol inhaler and Losartan as pt was hypertensive. This AM, patient had similar episode where she went to [facility] and c/o SOB, so neighbor called ambulance and patient was transferred to [place]. Patient seen and examined at bedside, is a poor historian, A&Ox1 (person). Patient states she was SOB this AM but does not know why she is here. C/o chest tightness but denies cough, wheezing, CP, palpitations, fevers, chills, nausea, abd pain. When asked if patient was in hospital on Monday, she replied "I suppose so." Per daughter, patient with significant mental decline since March. Patient received the Pfizer  Covid vaccine  3/26   and since then has had a progressive decline in mental status 2nd dose Vaccine was given 4/16 , Prior to March patient was A&Ox3 and independent, however since then patient has had increased confusion, found wandering to [facility] [place] [facility]". Patient does not have a PCP, has seen Dr. Clements once for pre-op clearance for cataract surgery 1 year ago. Daughter states they have been trying to make an appointment with a neurologist for her cognitive decline but have been unable to do so. Of note, patient has a history of hypotension and has never been on anti-hypertensive medications until Monday. Daughter states she was very surprised that her mother was found to be hypertensive and prescribed Losartan. Patient was given one dose yesterday.     In ED:  Vitals: T 98, HR 63, /76, RR 18, SpO2 100% on RA  Labs significant for: WBC 3.77, D-dimer 672, Cl 112, anion gap 2, AST 12, ALT 11  CTA: + acute bilateral pulmonary emboli, no evidence of R heart strain  CT Head: Small vessel ischemic changes in both hemispheres with volume loss and involutional change. No acute infarct or hemorrhagic lesion noted. Right frontal meningioma measuring approximately 1.1 cm.  EKG: NSR, rate 62, T wave abnormality   Given: 1L NS bolus x1, Albuterol inhaler x1, Lovenox 50mg x1  COVID 19-PCR -NEG       (28 Apr 2021 19:49)      INTERVAL HPI:  4/29/21: Pt admitted overnight for acute b/l PE. Pt seen and examined. Pt A&Ox1, unsure of why she is here. Pleasant, on RA and denies SOB. Has no acute complaints this AM. On lovenox 50 mg daily for PE. CXR no acute pulm disease, US duplex no dvt b/l. F/u TTE.   4/30/21: No acute overnight events. Per RN, pt did not sleep much throughout the night. Confused, tries to get out of bed but is redirectable and pleasant. Pt seen and examined, pt outside by nursing station as she keeps trying to climb out of bed. Pt confused, A&Ox1, but pleasant. She has no acute complaints. She is tolerating room air. Pt switched to eliquis 10 mg bid. TTE with no heart strain. PT recommending BENJAMIN.   5/1/21: No acute overnight events. Patient is very confused, but redirectable and pleasant. Mildly somnolent but responds to verbal and physical stimuli. A&Ox0-1 but pleasant. Tolerating room air. On Eliquis 10 mg BID. PT recommending BENJAMIN.  5/2/21: No acute overnight events. Pt is confused but alert, very pleasant and re-directable. Tolerating room air, on eliquis 10 mg BID. PT recommending BENJAMIN, pending SW.  5/3/21: No acute overnight events. Pt confused but alert, pleasant, sitting in bed comfortably eating breakfast. Tolerating room air, on eliquis 10 mg BID for PE. PT recommending BENJAMIN. Repeat COVID-19 PCR  positive, however per ID pt day 14 of illness and does not need isolation precautions.       OVERNIGHT EVENTS: none     Home Medications:  losartan 50 mg oral tablet: 1 tab(s) orally once a day (28 Apr 2021 19:49)      MEDICATIONS  (STANDING):  apixaban 10 milliGRAM(s) Oral every 12 hours  loratadine 10 milliGRAM(s) Oral daily  losartan 50 milliGRAM(s) Oral daily  melatonin 3 milliGRAM(s) Oral at bedtime  pantoprazole    Tablet 40 milliGRAM(s) Oral before breakfast  potassium phosphate / sodium phosphate Tablet (K-PHOS No. 2) 1 Tablet(s) Oral four times a day    MEDICATIONS  (PRN):  ALBUTerol    90 MICROgram(s) HFA Inhaler 1 Puff(s) Inhalation every 4 hours PRN Shortness of Breath and/or Wheezing      Allergies    No Known Allergies    Intolerances        REVIEW OF SYSTEMS: "I feel fine this morning "  CONSTITUTIONAL: No fever, No chills, No fatigue, No myalgia, No Body ache, No Weakness  EYES: No eye pain,  No visual disturbances, No discharge, NO Redness  ENMT:  No ear pain, No nose bleed, No vertigo; No sinus or throat pain, No Congestion  NECK: No pain, No stiffness  RESPIRATORY: No cough, wheezing, No  hemoptysis, No shortness of breath  CARDIOVASCULAR: No chest pain, palpitations  GASTROINTESTINAL: No abdominal or epigastric pain. No nausea, No vomiting; No diarrhea or constipation. [  ] BM  GENITOURINARY: No dysuria, No frequency, No urgency, No hematuria, or incontinence  NEUROLOGICAL: No headaches, No dizziness, No numbness, No tingling, No tremors, No weakness  EXT: No Swelling, No Pain, No Edema  SKIN:  [ x ] No itching, burning, rashes, or lesions   MUSCULOSKELETAL: No joint pain or swelling; No muscle pain, No back pain, No extremity pain  PSYCHIATRIC: No depression, anxiety, mood swings or difficulty sleeping at night  PAIN SCALE: [x  ] None  [  ] Other-  ROS unreliable due to - [ x ] Dementia  [  ] Lethargy  [  ] Sedated [  ] non verbal  REST OF REVIEW Of SYSTEM - [ x ] Normal     Vital Signs Last 24 Hrs  T(C): 36.9 (03 May 2021 11:46), Max: 36.9 (03 May 2021 11:46)  T(F): 98.4 (03 May 2021 11:46), Max: 98.4 (03 May 2021 11:46)  HR: 71 (03 May 2021 11:46) (64 - 75)  BP: 93/53 (03 May 2021 11:46) (93/53 - 127/75)  BP(mean): --  RR: 16 (03 May 2021 11:46) (16 - 17)  SpO2: 97% (03 May 2021 11:46) (97% - 97%)  Finger Stick        05-02 @ 07:01  -  05-03 @ 07:00  --------------------------------------------------------  IN: 240 mL / OUT: 0 mL / NET: 240 mL        PHYSICAL EXAM:  GENERAL:  [ x ] NAD , [ x ] well appearing, [  ] Agitated, [  ] Drowsy,  [  ] Lethargy, [ x ] confused   HEAD:  [ x ] Normal, [  ] Other  EYES:  [x  ] EOMI, [ x ] PERRLA, [x  ] conjunctiva and sclera clear normal, [  ] Other,  [  ] Pallor,[  ] Discharge  ENMT:  [ x ] Normal, [x  ] Moist mucous membranes, [  ] Good dentition, [ x ] No Thrush  NECK:  [ x ] Supple, [ x ] No JVD, [ x ] Normal thyroid, [  ] Lymphadenopathy [  ] Other  CHEST/LUNG:  [ x ] Clear to auscultation bilaterally, [ x ] Breath Sounds Decrease,  [x  ] No rales, [ x ] No rhonchi  [ x ]  No wheezing  HEART:  [ x ] Regular rate and rhythm, [  ] tachycardia, [  ] Bradycardia,  [  ] irregular  [ x ] No murmurs, No rubs, No gallops, [  ] PPM in place (Mfr:  )  ABDOMEN:  [x  ] Soft, [ x ] Nontender, [ x ] Nondistended, [ x ] No mass, [ x ] Bowel sounds present, [  ] obese  NERVOUS SYSTEM:  [ x ] Alert & Oriented X1, [ x ] Nonfocal  [ x ] Confusion  [  ] Encephalopathic [  ] Sedated [  ] Unable to assess, [ x ] Other-Dementia  EXTREMITIES: [ x ] 2+ Peripheral Pulses, No clubbing, No cyanosis,  [  ] edema B/L lower EXT. [  ] PVD stasis skin changes B/L Lower EXT  LYMPH: No lymphadenopathy noted  SKIN:  [ x ] No rashes or lesions, [  ] Pressure Ulcers, [  ] ecchymosis, [  ] Skin Tears, [  ] Other    DIET: Diet, Soft (04-28-21 @ 20:31) [Active]      LABS:                        11.9   4.87  )-----------( 190      ( 03 May 2021 06:44 )             36.2     03 May 2021 06:44    144    |  112    |  19     ----------------------------<  81     3.8     |  28     |  0.74     Ca    8.6        03 May 2021 06:44  Phos  3.8       03 May 2021 06:44  Mg     2.4       03 May 2021 06:44    CARDIAC MARKERS ( 28 Apr 2021 15:07 )  <.015 ng/mL / x     / x     / x     / x        Anemia Panel:  Vitamin B12, Serum: 427 pg/mL (04-29-21 @ 08:57)  Folate, Serum: 18.4 ng/mL (04-29-21 @ 08:57)      Thyroid Panel:  T4, Serum: 5.7 ug/dL (04-29-21 @ 08:57)  Thyroid Stimulating Hormone, Serum: 1.50 uIU/mL (04-29-21 @ 07:25)    Serum Pro-Brain Natriuretic Peptide: 87 pg/mL (04-28-21 @ 15:07)      RADIOLOGY & ADDITIONAL TESTS: none in past 24 hr       HEALTH ISSUES - PROBLEM Dx:  Pulmonary embolism, bilateral    Dementia    HTN (hypertension)    Asthma    Seasonal allergies    Need for prophylactic measure    Consultant(s) Notes Reviewed:  [ x ] YES     Care Discussed with [ x ] Consultants, [  ] Patient, [ x ] Family, [  ] HCP, [  ] , [ x ] Social Service, [ x ] RN, [  ] Physical Therapy, [  ] Palliative Care Team  DVT PPX: [  ] Lovenox, [  ] SC Heparin, [  ] Coumadin, [  ] Xarelto, [x  ] Eliquis, [  ] Pradaxa, [  ] IV Heparin drip, [  ] SCD, [  ] Ambulation, [  ] Contraindicated 2/2 GI Bleed, [  ] Contraindicated 2/2  Bleed, [  ] Contraindicated 2/2 Brain Bleed  Advanced Directive: [  ] None, [x  ] DNR/DNI

## 2021-05-03 NOTE — PROGRESS NOTE ADULT - ATTENDING COMMENTS
81 yo female PMH dementia, HTN, asthma controlled, seasonal allergies presents to ED c/o SOB, onset this AM. Patient discharged from [facility] on Monday 4/16 and diagnosed with Covid at that time. Patient admitted for acute bilat pulmonary emboli found on CTA.  pt seen, examined, case & care plan d/w, pt 's Dtr, residents at detail.  D/W Dr Bah follow up  D/W dtr today , PT----> BENJAMIN d/c planning, COVID PCR +   NO Need for isolation as d/w DR Yen.  PO diet  D/W Social service ----> Awaiting Auth. BENJAMIN  Total care time is 40 minutes.

## 2021-05-03 NOTE — PROGRESS NOTE ADULT - SUBJECTIVE AND OBJECTIVE BOX
Date/Time Patient Seen:  		  Referring MD:   Data Reviewed	       Patient is a 80y old  Female who presents with a chief complaint of SOB (30 Apr 2021 16:33)      Subjective/HPI     PAST MEDICAL & SURGICAL HISTORY:  No pertinent past medical history    Uncomplicated asthma, unspecified asthma severity  past history - currently stable with no medications    HTN (hypertension)    No significant past surgical history    S/P cataract surgery    History of partial hysterectomy          Medication list         MEDICATIONS  (STANDING):  apixaban 10 milliGRAM(s) Oral every 12 hours  loratadine 10 milliGRAM(s) Oral daily  losartan 50 milliGRAM(s) Oral daily  melatonin 3 milliGRAM(s) Oral at bedtime  pantoprazole    Tablet 40 milliGRAM(s) Oral before breakfast  potassium phosphate / sodium phosphate Tablet (K-PHOS No. 2) 1 Tablet(s) Oral four times a day    MEDICATIONS  (PRN):  ALBUTerol    90 MICROgram(s) HFA Inhaler 1 Puff(s) Inhalation every 4 hours PRN Shortness of Breath and/or Wheezing         Vitals log        ICU Vital Signs Last 24 Hrs  T(C): 36.5 (03 May 2021 05:00), Max: 36.6 (02 May 2021 13:02)  T(F): 97.7 (03 May 2021 05:00), Max: 97.9 (02 May 2021 13:02)  HR: 64 (03 May 2021 05:00) (64 - 75)  BP: 127/75 (03 May 2021 05:00) (102/61 - 127/75)  BP(mean): --  ABP: --  ABP(mean): --  RR: 17 (03 May 2021 05:00) (17 - 17)  SpO2: 97% (03 May 2021 05:00) (97% - 97%)           Input and Output:  I&O's Detail    01 May 2021 07:01  -  02 May 2021 07:00  --------------------------------------------------------  IN:    Oral Fluid: 240 mL  Total IN: 240 mL    OUT:  Total OUT: 0 mL    Total NET: 240 mL      02 May 2021 07:01  -  03 May 2021 06:20  --------------------------------------------------------  IN:    Oral Fluid: 120 mL  Total IN: 120 mL    OUT:  Total OUT: 0 mL    Total NET: 120 mL          Lab Data                        12.4   5.71  )-----------( 187      ( 02 May 2021 06:40 )             36.8     05-02    143  |  109<H>  |  20  ----------------------------<  84  3.6   |  25  |  0.88    Ca    8.2<L>      02 May 2021 06:40  Phos  4.6     05-02  Mg     2.3     05-02              Review of Systems	      Objective     Physical Examination    heart s1s2  lung dec BS  abd soft  head nc      Pertinent Lab findings & Imaging      Marta:  NO   Adequate UO     I&O's Detail    01 May 2021 07:01  -  02 May 2021 07:00  --------------------------------------------------------  IN:    Oral Fluid: 240 mL  Total IN: 240 mL    OUT:  Total OUT: 0 mL    Total NET: 240 mL      02 May 2021 07:01  -  03 May 2021 06:20  --------------------------------------------------------  IN:    Oral Fluid: 120 mL  Total IN: 120 mL    OUT:  Total OUT: 0 mL    Total NET: 120 mL               Discussed with:     Cultures:	        Radiology

## 2021-05-03 NOTE — PROGRESS NOTE ADULT - NSICDXPROBLEMPLAN2_GEN_ALL_CORE_FT
-Per daughter, patient with increased confusion past 1-2 months  -CT head +Small vessel ischemic changes in both hemispheres with volume loss and involutional change. No acute infarct or hemorrhagic lesion noted. Right frontal meningioma measuring approximately 1.1 cm.  -A&Ox1 at baseline on admission  -Likely 2/2 worsening dementia  -COVID PCR neg, RVP neg. Repeat COVID-19 PCR for dispo positive however per ID Dr. Yen pt is day 14 of infection and does not need isolation.   -Thyroid panel, B12, folate all wnl  -Fall precautions, aspiration precautions  -PT consulted, recommend BENJAMIN  -Neuro consulted Dr. Corea, recs appreciated   -Palliative Santhosh consulted for GOC discussion, DNR/DNI  -SW/CM consult -Per daughter, patient with increased confusion past 1-2 months  -CT head +Small vessel ischemic changes in both hemispheres with volume loss and involutional change. No acute infarct or hemorrhagic lesion noted. Right frontal meningioma measuring approximately 1.1 cm.  -A&Ox1 at baseline on admission  -Likely 2/2 worsening dementia  -COVID PCR neg, RVP neg. Repeat COVID-19 PCR for dispo positive however per ID Dr. Yen pt is day 14 of infection, had antibody infusion, and fully vaccinated and does not need isolation.   -Thyroid panel, B12, folate all wnl  -Fall precautions, aspiration precautions  -PT consulted, recommend BENJAMIN  -Neuro consulted Dr. Corea, recs appreciated   -Palliative Santhosh consulted for GOC discussion, DNR/DNI  -SW/CM consult -Per daughter, patient with increased confusion past 1-2 months  -CT head +Small vessel ischemic changes in both hemispheres with volume loss and involutional change. No acute infarct or hemorrhagic lesion noted. Right frontal meningioma measuring approximately 1.1 cm.  -Likely 2/2 worsening dementia  -COVID PCR neg, RVP neg. Repeat COVID-19 PCR for dispo positive however per ID Dr. Yen pt is day 14 of infection, had antibody infusion, and fully vaccinated and does not need isolation.   -Thyroid panel, B12, folate all wnl  -Fall precautions, aspiration precautions  -PT consulted, recommend BENJAMIN  -Neuro consulted Dr. Corea, recs appreciated   -Palliative Santhosh consulted for GOC discussion, DNR/DNI  -SW/CM consult

## 2021-05-03 NOTE — PROGRESS NOTE ADULT - NSICDXPROBLEMPLAN3_GEN_ALL_CORE_FT
-Patient recently prescribed Losartan 50mg upon d/c at [facility] on 4/26  -Per daughter, patient with history of hypotension  -Continue losartan with hold parameters, monitor routine hemodynamics
-Patient recently prescribed Losartan 50mg upon d/c at [facility] on 4/26  -Per daughter, patient with history of hypotension  -Continue losartan with hold parameters, monitor routine hemodynamics
-Patient recently prescribed Losartan 50mg upon d/c at [facility] on 4/26  -Per daughter, patient with history of hypotension  -Continue losartan with hold parameters, monitor routine hemodynamics
-Patient recently prescribed Losartan 50mg upon d/c at [facility] on 4/26  -Per daughter, patient with history of hypotension  -Continue losartan with hold parameters, monitor routine hemodynamics
-Patient recently prescribed Losartan 50mg upon d/c at [facility] on 4/26  -Per daughter, patient with history of hypotension  -Continue losartan with hold parameters, monitor routine hemodynamics

## 2021-05-03 NOTE — PROGRESS NOTE ADULT - SUBJECTIVE AND OBJECTIVE BOX
Neurology Follow up note    ANIRUDH RICHARDSON80yFemale    HPI:  81 yo female PMH dementia, HTN, asthma controlled, seasonal allergies presents to ED c/o SOB. Onset this AM. Hx obtained from daughter Yanelis Fraire as patient has hx of dementia with significant mental decline for past 1-2 months. Daughter states on Monday AM patient went to neighbors house stating she was SOB. Neighbor called the ambulance and patient was transferred to [facility] and diagnosed with Covid,+ Rxed  with Regen, was observation  daughter does not know how patient got covid as patient is largely home bound with no visitors. Patient was d/c the same day (4/26) with albuterol inhaler and Losartan as pt was hypertensive. This AM, patient had similar episode where she went to [facility] and c/o SOB, so neighbor called ambulance and patient was transferred to [place]. Patient seen and examined at bedside, is a poor historian, A&Ox1 (person). Patient states she was SOB this AM but does not know why she is here. C/o chest tightness but denies cough, wheezing, CP, palpitations, fevers, chills, nausea, abd pain. When asked if patient was in hospital on Monday, she replied "I suppose so." Per daughter, patient with significant mental decline since March. Patient received the Pfizer  Covid vaccine  3/26   and since then has had a progressive decline in mental status 2nd dose Vaccine was given 4/16 , Prior to March patient was A&Ox3 and independent, however since then patient has had increased confusion, found wandering to [facility]. Patient does not have a PCP, has seen Dr. Clements once for pre-op clearance for cataract surgery 1 year ago. Daughter states they have been trying to make an appointment with a neurologist for her cognitive decline but have been unable to do so. Of note, patient has a history of hypotension and has never been on anti-hypertensive medications until Monday. Daughter states she was very surprised that her mother was found to be hypertensive and prescribed Losartan. Patient was given one dose yesterday.     In ED:  Vitals: T 98, HR 63, /76, RR 18, SpO2 100% on RA  Labs significant for: WBC 3.77, D-dimer 672, Cl 112, anion gap 2, AST 12, ALT 11  CTA: + acute bilateral pulmonary emboli, no evidence of R heart strain  CT Head: Small vessel ischemic changes in both hemispheres with volume loss and involutional change. No acute infarct or hemorrhagic lesion noted. Right frontal meningioma measuring approximately 1.1 cm.  EKG: NSR, rate 62, T wave abnormality   Given: 1L NS bolus x1, Albuterol inhaler x1, Lovenox 50mg x1  COVID 19-PCR -NEG       (28 Apr 2021 19:49)      Interval History - no complaints    Patient is seen, chart was reviewed and case was discussed with the treatment team.  Pt is not in any distress.   Lying on bed comfortably.     Vital Signs Last 24 Hrs  T(C): 36.9 (03 May 2021 11:46), Max: 36.9 (03 May 2021 11:46)  T(F): 98.4 (03 May 2021 11:46), Max: 98.4 (03 May 2021 11:46)  HR: 71 (03 May 2021 11:46) (64 - 75)  BP: 93/53 (03 May 2021 11:46) (93/53 - 127/75)  BP(mean): --  RR: 16 (03 May 2021 11:46) (16 - 17)  SpO2: 97% (03 May 2021 11:46) (97% - 97%)        REVIEW OF SYSTEMS:    Constitutional: No fever,   Eyes: No eye pain, visual disturbances, or discharge  ENT:  No difficulty hearing, tinnitus, vertigo; No sinus or throat pain  Neck: No pain or stiffness  Respiratory: No , wheezing, chills or hemoptysis  Cardiovascular: No chest pain, palpitations, s  Gastrointestinal: No abdominal or epigastric pain. No nausea, vomiting or hematemesis;   Genitourinary: No dysuria, frequency, hematuria or incontinence  Neurological: No headaches,   Psychiatric: No d, mood swings or difficulty sleeping  Musculoskeletal: No joint pain or swelling; No muscle, back or extremity pain  Skin: No itching, burning, rashes or lesions   Lymph Nodes: No enlarged glands  Endocrine: No heat or cold intolerance;  Allergy and Immunologic: No hives or eczema    On Neurological Examination:    Mental Status - Pt is alert, awake, oriented X1  . Follows commands well and able to answer questions appropriately    Speech -  Normal.    Cranial Nerves - Pupils 3 mm equal and reactive to light, extraocular eye movements intact. Pt has no visual field deficit.  Pt has no facial asymmetry. Facial sensation is intact.Tongue - is in midline.    Muscle tone - is normal     Motor Exam - 4+/5 all over,   No drift. No shaking or tremors.    Sensory Exam . Pt withdraws all extremities equally on stimulation. No asymmetry seen. No complaints of tingling, numbness.        coordination:    Finger to nose: normal    Heel to shin: normal    Deep tendon Reflexes - 2 plus all over.        Romberg - Negative.    Neck Supple -  Yes.     MEDICATIONS    ALBUTerol    90 MICROgram(s) HFA Inhaler 1 Puff(s) Inhalation every 4 hours PRN  apixaban 10 milliGRAM(s) Oral every 12 hours  loratadine 10 milliGRAM(s) Oral daily  losartan 50 milliGRAM(s) Oral daily  melatonin 3 milliGRAM(s) Oral at bedtime  pantoprazole    Tablet 40 milliGRAM(s) Oral before breakfast  potassium phosphate / sodium phosphate Tablet (K-PHOS No. 2) 1 Tablet(s) Oral four times a day      Allergies    No Known Allergies    Intolerances             05-03    144  |  112<H>  |  19  ----------------------------<  81  3.8   |  28  |  0.74    Ca    8.6      03 May 2021 06:44  Phos  3.8     05-03  Mg     2.4     05-03                Hemoglobin A1C:     Vitamin B12     RADIOLOGY    ASSESSMENT AND PLAN:      seen for ams-  metabolic encephalopathy  dementia  PE    SLEEPING WELL ON MELOTONIN  on AC  Physical therapy evaluation.  OOB to chair/ambulation with assistance only.  Advanced care planning was discussed with family.  Pain is accessed and addressed.

## 2021-05-03 NOTE — PROGRESS NOTE ADULT - NSICDXPROBLEMPLAN5_GEN_ALL_CORE_FT
-Continue loratadine daily
-Chronic, controlled  -Patient intermittently on Albuterol inhaler at home  -Continue PRN
-Chronic, controlled  -Patient intermittently on Albuterol inhaler at home  -Continue PRN
-Continue loratadine daily
-Chronic, controlled  -Patient intermittently on Albuterol inhaler at home  -Continue PRN

## 2021-05-03 NOTE — PROGRESS NOTE ADULT - NSICDXPROBLEMPLAN6_GEN_ALL_CORE_FT
-DVT PPx: eliquis 10 mg bid   -Protonix daily  -Will need to confirm with family correct pharmacy for meds on DC
-DVT PPx: eliquis 10 mg bid   -Protonix daily
-DVT PPx: eliquis 10 mg bid   -Protonix daily  -Will need to confirm with family correct pharmacy for meds on DC
-DVT PPx: eliquis 10 mg bid   -Protonix daily
-DVT PPx: Lovenox 50mg BID for tx of PE  -Protonix daily  -Will need to confirm with family correct pharmacy for meds on DC

## 2021-05-03 NOTE — PROGRESS NOTE ADULT - NSICDXPROBLEMPLAN1_GEN_ALL_CORE_FT
-CTA showing acute bilateral pulmonary emboli, no evidence of R heart strain   -EKG showing NSR, rate 62, t wave abnormality, no signs of R heart strain  -TTE shows no heart strain  -Continue to monitor on tele with cont pulse ox, pt on RA  -Continue with eliquis 10 mg BID, loading dose followed   -B/l dopplers lower extremities neg for DVT   -CXR neg for active pulm disease   -Monitor for signs/symptoms of bleeding  -Pulm Dr. Spangler consulted  -Heme Dr. Bah d/w [facility] records reviewd , pt was COVID + 4/26, s/p monoclonal AB given , Pt has been Fully Vaccinated 2 inj completed. pt's initial SOB was 1 week prior to [facility]. Per ID, pt no longer contagious  -Repeat COVID-19 PCR for dispo positive, however per ID Dr. Yen pt is day 14 of COVID-19 and does not need isolation -CTA showing acute bilateral pulmonary emboli, no evidence of R heart strain   -EKG showing NSR, rate 62, t wave abnormality, no signs of R heart strain  -TTE shows no heart strain  -Continue to monitor on tele with cont pulse ox, pt on RA  -Continue with eliquis 10 mg BID, loading dose followed   -B/l dopplers lower extremities neg for DVT   -CXR neg for active pulm disease   -Monitor for signs/symptoms of bleeding  -Pulm Dr. Spangler consulted  -Heme Dr. Bah d/w [facility] records reviewd , pt was COVID + 4/26, s/p monoclonal AB given , Pt has been Fully Vaccinated 2 inj completed. pt's initial SOB was 1 week prior to [facility]. Per ID, pt no longer contagious  -Repeat COVID-19 PCR for dispo positive, however per ID Dr. Yen pt is day 14 of COVID-19, had antibody infusion, and fully vaccinated and does not need isolation -CTA showing acute bilateral pulmonary emboli, no evidence of R heart strain   -EKG showing NSR, rate 62, t wave abnormality, no signs of R heart strain  -TTE shows no heart strain  -Continue to monitor on tele with cont pulse ox, pt on RA  -Continue with eliquis 10 mg BID, loading dose followed   -B/l dopplers lower extremities neg for DVT   -CXR neg for active pulm disease   -Pulm Dr. Spangler -on case  -Heme Dr. Bah d/w [facility] records reviewd , pt was COVID + 4/26, s/p monoclonal AB given , Pt has been Fully Vaccinated 2 inj completed. pt's initial SOB was 1 week prior to [facility]. Per ID, pt no longer contagious  -Repeat COVID-19 PCR for dispo positive, however per ID Dr. Yen pt is day 14 of COVID-19, had antibody infusion, and fully vaccinated and does not need isolation

## 2021-05-03 NOTE — PROGRESS NOTE ADULT - SUBJECTIVE AND OBJECTIVE BOX
[facility] DIVISION of INFECTIOUS DISEASE  Sarmad Yen MD PhD, Cielo Paniagua MD, Elvie Wallace MD, Gianni Mandujano MD  and providing coverage with Naomi Lira MD and Darrell Mart MD  Providing Infectious Disease Consultations at [facility], [facility], [facility]'s      Office# [phone] to schedule follow up appointments  Answering Service for urgent calls or New Consults [phone]  Cell# to text for urgent issues Sarmad Yen [phone]     Infectious diseases progress note:    ANIRUDH RICHARDSON is a 80y y. o. Female patient    COVID Patient    Allergies    No Known Allergies    Intolerances        ANTIBIOTICS/RELEVANT:  antimicrobials    immunologic:    OTHER:  ALBUTerol    90 MICROgram(s) HFA Inhaler 1 Puff(s) Inhalation every 4 hours PRN  apixaban 10 milliGRAM(s) Oral every 12 hours  loratadine 10 milliGRAM(s) Oral daily  losartan 50 milliGRAM(s) Oral daily  melatonin 3 milliGRAM(s) Oral at bedtime  pantoprazole    Tablet 40 milliGRAM(s) Oral before breakfast  potassium phosphate / sodium phosphate Tablet (K-PHOS No. 2) 1 Tablet(s) Oral four times a day      Objective:  Vital Signs Last 24 Hrs  T(C): 36.5 (03 May 2021 05:00), Max: 36.6 (02 May 2021 13:02)  T(F): 97.7 (03 May 2021 05:00), Max: 97.9 (02 May 2021 13:02)  HR: 64 (03 May 2021 05:00) (64 - 75)  BP: 127/75 (03 May 2021 05:00) (102/61 - 127/75)  BP(mean): --  RR: 17 (03 May 2021 05:00) (17 - 17)  SpO2: 97% (03 May 2021 05:00) (97% - 97%)    T(C): 36.5 (05-03-21 @ 05:00), Max: 36.7 (05-01-21 @ 19:26)  T(C): 36.5 (05-03-21 @ 05:00), Max: 36.7 (05-01-21 @ 19:26)  T(C): 36.5 (05-03-21 @ 05:00), Max: 36.7 (05-01-21 @ 19:26)    PHYSICAL EXAM:  HEENT: NC atraumatic  Neck: supple  Respiratory: no accessory muscle use, breathing comfortably  Cardiovascular: distant  Gastrointestinal: normal appearing, nondistended  Extremities: no clubbing, no cyanosis,        LABS:                          11.9   4.87  )-----------( 190      ( 03 May 2021 06:44 )             36.2       4.87 05-03 @ 06:44  5.71 05-02 @ 06:40  4.74 05-01 @ 08:23  4.37 04-30 @ 06:50  3.89 04-29 @ 07:25  3.77 04-28 @ 15:07      05-03    144  |  112<H>  |  19  ----------------------------<  81  3.8   |  28  |  0.74    Ca    8.6      03 May 2021 06:44  Phos  3.8     05-03  Mg     2.4     05-03        Creatinine, Serum: 0.74 mg/dL (05-03-21 @ 06:44)  Creatinine, Serum: 0.88 mg/dL (05-02-21 @ 06:40)  Creatinine, Serum: 0.67 mg/dL (05-01-21 @ 08:23)  Creatinine, Serum: 0.67 mg/dL (04-30-21 @ 06:50)  Creatinine, Serum: 0.47 mg/dL (04-29-21 @ 07:25)  Creatinine, Serum: 0.65 mg/dL (04-28-21 @ 15:07)                COVID RISK SCORE  Auto Neutrophil #: 1.61 K/uL (04-29-21 @ 07:25)  Auto Lymphocyte #: 1.45 K/uL (04-29-21 @ 07:25)  Auto Neutrophil #: 1.56 K/uL (04-28-21 @ 15:07)  Auto Lymphocyte #: 1.39 K/uL (04-28-21 @ 15:07)      Auto Eosinophil #: 0.35 K/uL (04-29-21 @ 07:25)  Auto Eosinophil #: 0.34 K/uL (04-28-21 @ 15:07)          Troponin I, Serum: <.015 ng/mL (04-28-21 @ 15:07)                INR: 1.14 ratio (04-29-21 @ 07:25)  Activated Partial Thromboplastin Time: 36.8 sec (04-29-21 @ 07:25)    D-Dimer Assay, Quantitative: 672 ng/mL DDU (04-28-21 @ 15:07)        MICROBIOLOGY:    COVID-19 PCR . (05.02.21 @ 21:53)    COVID-19 PCR: Detected:         RADIOLOGY & ADDITIONAL STUDIES:

## 2021-05-03 NOTE — PROGRESS NOTE ADULT - NSICDXPROBLEMPLAN4_GEN_ALL_CORE_FT
-Chronic, controlled  -Patient intermittently on Albuterol inhaler at home  -Continue PRN
-Chronic, controlled  -Patient intermittently on Albuterol inhaler at home  -Continue PRN
-Start loratadine daily
-Continue loratadine daily
-Continue loratadine daily

## 2021-05-04 DIAGNOSIS — U07.1 COVID-19: ICD-10-CM

## 2021-05-04 LAB
ANION GAP SERPL CALC-SCNC: 5 MMOL/L — SIGNIFICANT CHANGE UP (ref 5–17)
BUN SERPL-MCNC: 14 MG/DL — SIGNIFICANT CHANGE UP (ref 7–23)
CALCIUM SERPL-MCNC: 8.7 MG/DL — SIGNIFICANT CHANGE UP (ref 8.5–10.1)
CHLORIDE SERPL-SCNC: 110 MMOL/L — HIGH (ref 96–108)
CO2 SERPL-SCNC: 28 MMOL/L — SIGNIFICANT CHANGE UP (ref 22–31)
CREAT SERPL-MCNC: 0.62 MG/DL — SIGNIFICANT CHANGE UP (ref 0.5–1.3)
GLUCOSE SERPL-MCNC: 81 MG/DL — SIGNIFICANT CHANGE UP (ref 70–99)
HCT VFR BLD CALC: 36.1 % — SIGNIFICANT CHANGE UP (ref 34.5–45)
HGB BLD-MCNC: 12 G/DL — SIGNIFICANT CHANGE UP (ref 11.5–15.5)
MAGNESIUM SERPL-MCNC: 2.4 MG/DL — SIGNIFICANT CHANGE UP (ref 1.6–2.6)
MCHC RBC-ENTMCNC: 31.9 PG — SIGNIFICANT CHANGE UP (ref 27–34)
MCHC RBC-ENTMCNC: 33.2 GM/DL — SIGNIFICANT CHANGE UP (ref 32–36)
MCV RBC AUTO: 96 FL — SIGNIFICANT CHANGE UP (ref 80–100)
NRBC # BLD: 0 /100 WBCS — SIGNIFICANT CHANGE UP (ref 0–0)
PHOSPHATE SERPL-MCNC: 4.1 MG/DL — SIGNIFICANT CHANGE UP (ref 2.5–4.5)
PLATELET # BLD AUTO: 183 K/UL — SIGNIFICANT CHANGE UP (ref 150–400)
POTASSIUM SERPL-MCNC: 3.7 MMOL/L — SIGNIFICANT CHANGE UP (ref 3.5–5.3)
POTASSIUM SERPL-SCNC: 3.7 MMOL/L — SIGNIFICANT CHANGE UP (ref 3.5–5.3)
RBC # BLD: 3.76 M/UL — LOW (ref 3.8–5.2)
RBC # FLD: 13.2 % — SIGNIFICANT CHANGE UP (ref 10.3–14.5)
SARS-COV-2 RNA SPEC QL NAA+PROBE: DETECTED
SODIUM SERPL-SCNC: 143 MMOL/L — SIGNIFICANT CHANGE UP (ref 135–145)
WBC # BLD: 4.58 K/UL — SIGNIFICANT CHANGE UP (ref 3.8–10.5)
WBC # FLD AUTO: 4.58 K/UL — SIGNIFICANT CHANGE UP (ref 3.8–10.5)

## 2021-05-04 RX ORDER — LANOLIN ALCOHOL/MO/W.PET/CERES
5 CREAM (GRAM) TOPICAL ONCE
Refills: 0 | Status: COMPLETED | OUTPATIENT
Start: 2021-05-04 | End: 2021-05-04

## 2021-05-04 RX ADMIN — Medication 1 TABLET(S): at 17:01

## 2021-05-04 RX ADMIN — Medication 1 TABLET(S): at 05:23

## 2021-05-04 RX ADMIN — PANTOPRAZOLE SODIUM 40 MILLIGRAM(S): 20 TABLET, DELAYED RELEASE ORAL at 05:23

## 2021-05-04 RX ADMIN — Medication 3 MILLIGRAM(S): at 20:46

## 2021-05-04 RX ADMIN — Medication 5 MILLIGRAM(S): at 23:15

## 2021-05-04 RX ADMIN — LORATADINE 10 MILLIGRAM(S): 10 TABLET ORAL at 11:40

## 2021-05-04 RX ADMIN — Medication 1 TABLET(S): at 11:40

## 2021-05-04 RX ADMIN — APIXABAN 10 MILLIGRAM(S): 2.5 TABLET, FILM COATED ORAL at 05:24

## 2021-05-04 RX ADMIN — LOSARTAN POTASSIUM 50 MILLIGRAM(S): 100 TABLET, FILM COATED ORAL at 05:24

## 2021-05-04 RX ADMIN — APIXABAN 10 MILLIGRAM(S): 2.5 TABLET, FILM COATED ORAL at 17:01

## 2021-05-04 NOTE — PROGRESS NOTE ADULT - SUBJECTIVE AND OBJECTIVE BOX
Neurology Follow up note    ANIRUDH RICHARDSON80yFemale    HPI:  79 yo female PMH dementia, HTN, asthma controlled, seasonal allergies presents to ED c/o SOB. Onset this AM. Hx obtained from daughter Yanelis Fraire as patient has hx of dementia with significant mental decline for past 1-2 months. Daughter states on Monday AM patient went to neighbors house stating she was SOB. Neighbor called the ambulance and patient was transferred to [facility] and diagnosed with Covid,+ Rxed  with Regen, was observation  daughter does not know how patient got covid as patient is largely home bound with no visitors. Patient was d/c the same day (4/26) with albuterol inhaler and Losartan as pt was hypertensive. This AM, patient had similar episode where she went to [facility] and c/o SOB, so neighbor called ambulance and patient was transferred to [place]. Patient seen and examined at bedside, is a poor historian, A&Ox1 (person). Patient states she was SOB this AM but does not know why she is here. C/o chest tightness but denies cough, wheezing, CP, palpitations, fevers, chills, nausea, abd pain. When asked if patient was in hospital on Monday, she replied "I suppose so." Per daughter, patient with significant mental decline since March. Patient received the Pfizer  Covid vaccine  3/26   and since then has had a progressive decline in mental status 2nd dose Vaccine was given 4/16 , Prior to March patient was A&Ox3 and independent, however since then patient has had increased confusion, found wandering to [facility]. Patient does not have a PCP, has seen Dr. Clements once for pre-op clearance for cataract surgery 1 year ago. Daughter states they have been trying to make an appointment with a neurologist for her cognitive decline but have been unable to do so. Of note, patient has a history of hypotension and has never been on anti-hypertensive medications until Monday. Daughter states she was very surprised that her mother was found to be hypertensive and prescribed Losartan. Patient was given one dose yesterday.     In ED:  Vitals: T 98, HR 63, /76, RR 18, SpO2 100% on RA  Labs significant for: WBC 3.77, D-dimer 672, Cl 112, anion gap 2, AST 12, ALT 11  CTA: + acute bilateral pulmonary emboli, no evidence of R heart strain  CT Head: Small vessel ischemic changes in both hemispheres with volume loss and involutional change. No acute infarct or hemorrhagic lesion noted. Right frontal meningioma measuring approximately 1.1 cm.  EKG: NSR, rate 62, T wave abnormality   Given: 1L NS bolus x1, Albuterol inhaler x1, Lovenox 50mg x1  COVID 19-PCR -NEG       (28 Apr 2021 19:49)      Interval History - no new events    Patient is seen, chart was reviewed and case was discussed with the treatment team.  Pt is not in any distress.   Lying on bed comfortably.     Vital Signs Last 24 Hrs  T(C): 36.3 (04 May 2021 12:56), Max: 36.6 (03 May 2021 19:35)  T(F): 97.3 (04 May 2021 12:56), Max: 97.9 (03 May 2021 19:35)  HR: 65 (04 May 2021 12:56) (65 - 72)  BP: 108/72 (04 May 2021 12:56) (108/72 - 129/76)  BP(mean): --  RR: 18 (04 May 2021 12:56) (16 - 18)  SpO2: 98% (04 May 2021 12:56) (95% - 98%))        REVIEW OF SYSTEMS:    Constitutional: No fever,   Eyes: No eye pain, visual disturbances, or discharge  ENT:  No difficulty hearing, tinnitus, vertigo; No sinus or throat pain  Neck: No pain or stiffness  Respiratory: No , wheezing, chills or hemoptysis  Cardiovascular: No chest pain, palpitations, s  Gastrointestinal: No abdominal or epigastric pain. No nausea, vomiting or hematemesis;   Genitourinary: No dysuria, frequency, hematuria or incontinence  Neurological: No headaches,   Psychiatric: No d, mood swings or difficulty sleeping  Musculoskeletal: No joint pain or swelling; No muscle, back or extremity pain  Skin: No itching, burning, rashes or lesions   Lymph Nodes: No enlarged glands  Endocrine: No heat or cold intolerance;  Allergy and Immunologic: No hives or eczema    On Neurological Examination:    Mental Status - Pt is alert, awake, oriented X1  . Follows commands well and able to answer questions appropriately    Speech -  Normal.    Cranial Nerves - Pupils 3 mm equal and reactive to light, extraocular eye movements intact. Pt has no visual field deficit.  Pt has no facial asymmetry. Facial sensation is intact.Tongue - is in midline.    Muscle tone - is normal     Motor Exam - 4+/5 all over,   No drift. No shaking or tremors.    Sensory Exam . Pt withdraws all extremities equally on stimulation. No asymmetry seen. No complaints of tingling, numbness.        coordination:    Finger to nose: normal    Heel to shin: normal    Deep tendon Reflexes - 2 plus all over.        Romberg - Negative.    Neck Supple -  Yes.     MEDICATIONS    ALBUTerol    90 MICROgram(s) HFA Inhaler 1 Puff(s) Inhalation every 4 hours PRN  apixaban 10 milliGRAM(s) Oral every 12 hours  loratadine 10 milliGRAM(s) Oral daily  losartan 50 milliGRAM(s) Oral daily  melatonin 3 milliGRAM(s) Oral at bedtime  pantoprazole    Tablet 40 milliGRAM(s) Oral before breakfast  potassium phosphate / sodium phosphate Tablet (K-PHOS No. 2) 1 Tablet(s) Oral four times a day      Allergies    No Known Allergies    Intolerances                                   12.0   4.58  )-----------( 183      ( 04 May 2021 08:01 )             36.1     05-04    143  |  110<H>  |  14  ----------------------------<  81  3.7   |  28  |  0.62    Ca    8.7      04 May 2021 08:01  Phos  4.1     05-04  Mg     2.4     05-04                  Hemoglobin A1C:     Vitamin B12     RADIOLOGY    ASSESSMENT AND PLAN:      seen for ams-  metabolic encephalopathy  dementia  PE    NO FURTHER NEURO W/U  on AC  Physical therapy evaluation.  OOB to chair/ambulation with assistance only.  Advanced care planning was discussed with family.  Pain is accessed and addressed.

## 2021-05-04 NOTE — PROGRESS NOTE ADULT - SUBJECTIVE AND OBJECTIVE BOX
Patient is a 80y old  Female who presents with a chief complaint of SOB (30 Apr 2021 16:33)      HPI:  81 yo female PMH dementia, HTN, asthma controlled, seasonal allergies presents to ED c/o SOB. Onset this AM. Hx obtained from daughter Yanelis Fraire as patient has hx of dementia with significant mental decline for past 1-2 months. Daughter states on Monday AM patient went to [facility] stating she was SOB. Neighbor called the ambulance and patient was transferred to [facility] and diagnosed with Covid,+ Rxed  with Regen, was observation  daughter does not know how patient got covid as patient is largely home bound with no visitors. Patient was d/c the same day (4/26) with albuterol inhaler and Losartan as pt was hypertensive. This AM, patient had similar episode where she went to [facility] and c/o SOB, so neighbor called ambulance and patient was transferred to [place]. Patient seen and examined at bedside, is a poor historian, A&Ox1 (person). Patient states she was SOB this AM but does not know why she is here. C/o chest tightness but denies cough, wheezing, CP, palpitations, fevers, chills, nausea, abd pain. When asked if patient was in hospital on Monday, she replied "I suppose so." Per daughter, patient with significant mental decline since March. Patient received the Pfizer  Covid vaccine  3/26   and since then has had a progressive decline in mental status 2nd dose Vaccine was given 4/16 , Prior to March patient was A&Ox3 and independent, however since then patient has had increased confusion, found wandering to [facility]. Patient does not have a PCP, has seen Dr. Clements once for pre-op clearance for cataract surgery 1 year ago. Daughter states they have been trying to make an appointment with a neurologist for her cognitive decline but have been unable to do so. Of note, patient has a history of hypotension and has never been on anti-hypertensive medications until Monday. Daughter states she was very surprised that her mother was found to be hypertensive and prescribed Losartan. Patient was given one dose yesterday.     In ED:  Vitals: T 98, HR 63, /76, RR 18, SpO2 100% on RA  Labs significant for: WBC 3.77, D-dimer 672, Cl 112, anion gap 2, AST 12, ALT 11  CTA: + acute bilateral pulmonary emboli, no evidence of R heart strain  CT Head: Small vessel ischemic changes in both hemispheres with volume loss and involutional change. No acute infarct or hemorrhagic lesion noted. Right frontal meningioma measuring approximately 1.1 cm.  EKG: NSR, rate 62, T wave abnormality   Given: 1L NS bolus x1, Albuterol inhaler x1, Lovenox 50mg x1  COVID 19-PCR -NEG       (28 Apr 2021 19:49)      INTERVAL HPI:  4/29/21: Pt admitted overnight for acute b/l PE. Pt seen and examined. Pt A&Ox1, unsure of why she is here. Pleasant, on RA and denies SOB. Has no acute complaints this AM. On lovenox 50 mg daily for PE. CXR no acute pulm disease, US duplex no dvt b/l. F/u TTE.   4/30/21: No acute overnight events. Per RN, pt did not sleep much throughout the night. Confused, tries to get out of bed but is redirectable and pleasant. Pt seen and examined, pt outside by nursing station as she keeps trying to climb out of bed. Pt confused, A&Ox1, but pleasant. She has no acute complaints. She is tolerating room air. Pt switched to eliquis 10 mg bid. TTE with no heart strain. PT recommending BENJAMIN.   5/1/21: No acute overnight events. Patient is very confused, but redirectable and pleasant. Mildly somnolent but responds to verbal and physical stimuli. A&Ox0-1 but pleasant. Tolerating room air. On Eliquis 10 mg BID. PT recommending BENJAMIN.  5/2/21: No acute overnight events. Pt is confused but alert, very pleasant and re-directable. Tolerating room air, on eliquis 10 mg BID. PT recommending BENJAMIN, pending SW.  5/3/21: No acute overnight events. Pt confused but alert, pleasant, sitting in bed comfortably eating breakfast. Tolerating room air, on eliquis 10 mg BID for PE. PT recommending BENJAMIN. Repeat COVID-19 PCR  positive, however per ID pt day 14 of illness and does not need isolation precautions.   5/4/21: No acute overnight events. Pt pleasant, confused, sitting up in bed in no acute distress. Tolerating room air, has no acute complaints. Discharge pending negative repeat COVID-19 PCR. F/u repeat COVID-19. Per ID no need for isolation precautions.       OVERNIGHT EVENTS: none     Home Medications:  losartan 50 mg oral tablet: 1 tab(s) orally once a day (28 Apr 2021 19:49)      MEDICATIONS  (STANDING):  apixaban 10 milliGRAM(s) Oral every 12 hours  loratadine 10 milliGRAM(s) Oral daily  losartan 50 milliGRAM(s) Oral daily  melatonin 3 milliGRAM(s) Oral at bedtime  pantoprazole    Tablet 40 milliGRAM(s) Oral before breakfast  potassium phosphate / sodium phosphate Tablet (K-PHOS No. 2) 1 Tablet(s) Oral four times a day    MEDICATIONS  (PRN):  ALBUTerol    90 MICROgram(s) HFA Inhaler 1 Puff(s) Inhalation every 4 hours PRN Shortness of Breath and/or Wheezing      Allergies    No Known Allergies    Intolerances        REVIEW OF SYSTEMS: "I feel great"  CONSTITUTIONAL: No fever, No chills, No fatigue, No myalgia, No Body ache, No Weakness  EYES: No eye pain,  No visual disturbances, No discharge, NO Redness  ENMT:  No ear pain, No nose bleed, No vertigo; No sinus or throat pain, No Congestion  NECK: No pain, No stiffness  RESPIRATORY: No cough, wheezing, No  hemoptysis, No shortness of breath  CARDIOVASCULAR: No chest pain, palpitations  GASTROINTESTINAL: No abdominal or epigastric pain. No nausea, No vomiting; No diarrhea or constipation. [  ] BM  GENITOURINARY: No dysuria, No frequency, No urgency, No hematuria, or incontinence  NEUROLOGICAL: No headaches, No dizziness, No numbness, No tingling, No tremors, No weakness  EXT: No Swelling, No Pain, No Edema  SKIN:  [ x ] No itching, burning, rashes, or lesions   MUSCULOSKELETAL: No joint pain or swelling; No muscle pain, No back pain, No extremity pain  PSYCHIATRIC: No depression, anxiety, mood swings or difficulty sleeping at night  PAIN SCALE: [x  ] None  [  ] Other-  ROS unreliable due to - [ x ] Dementia  [  ] Lethargy  [  ] Sedated [  ] non verbal  REST OF REVIEW Of SYSTEM - [ x ] Normal     Vital Signs Last 24 Hrs  T(C): 36.3 (04 May 2021 12:56), Max: 36.6 (03 May 2021 19:35)  T(F): 97.3 (04 May 2021 12:56), Max: 97.9 (03 May 2021 19:35)  HR: 65 (04 May 2021 12:56) (65 - 72)  BP: 108/72 (04 May 2021 12:56) (108/72 - 129/76)  BP(mean): --  RR: 18 (04 May 2021 12:56) (16 - 18)  SpO2: 98% (04 May 2021 12:56) (95% - 98%)  Finger Stick        05-03 @ 07:01  -  05-04 @ 07:00  --------------------------------------------------------  IN: 780 mL / OUT: 0 mL / NET: 780 mL        PHYSICAL EXAM:  GENERAL:  [ x ] NAD , [ x ] well appearing, [  ] Agitated, [  ] Drowsy,  [  ] Lethargy, [ x ] confused   HEAD:  [ x ] Normal, [  ] Other  EYES:  [x  ] EOMI, [ x ] PERRLA, [x  ] conjunctiva and sclera clear normal, [  ] Other,  [  ] Pallor,[  ] Discharge  ENMT:  [ x ] Normal, [x  ] Moist mucous membranes, [  ] Good dentition, [ x ] No Thrush  NECK:  [ x ] Supple, [ x ] No JVD, [ x ] Normal thyroid, [  ] Lymphadenopathy [  ] Other  CHEST/LUNG:  [ x ] Clear to auscultation bilaterally, [ x ] Breath Sounds Decrease,  [x  ] No rales, [ x ] No rhonchi  [ x ]  No wheezing  HEART:  [ x ] Regular rate and rhythm, [  ] tachycardia, [  ] Bradycardia,  [  ] irregular  [ x ] No murmurs, No rubs, No gallops, [  ] PPM in place (Mfr:  )  ABDOMEN:  [x  ] Soft, [ x ] Nontender, [ x ] Nondistended, [ x ] No mass, [ x ] Bowel sounds present, [  ] obese  NERVOUS SYSTEM:  [ x ] Alert & Oriented X1, [ x ] Nonfocal  [ x ] Confusion  [  ] Encephalopathic [  ] Sedated [  ] Unable to assess, [ x ] Other-Dementia  EXTREMITIES: [ x ] 2+ Peripheral Pulses, No clubbing, No cyanosis,  [  ] edema B/L lower EXT. [  ] PVD stasis skin changes B/L Lower EXT  LYMPH: No lymphadenopathy noted  SKIN:  [ x ] No rashes or lesions, [  ] Pressure Ulcers, [  ] ecchymosis, [  ] Skin Tears, [  ] Other    DIET: Diet, Soft (04-28-21 @ 20:31) [Active]      LABS:                        12.0   4.58  )-----------( 183      ( 04 May 2021 08:01 )             36.1     04 May 2021 08:01    143    |  110    |  14     ----------------------------<  81     3.7     |  28     |  0.62     Ca    8.7        04 May 2021 08:01  Phos  4.1       04 May 2021 08:01  Mg     2.4       04 May 2021 08:01                    CARDIAC MARKERS ( 28 Apr 2021 15:07 )  <.015 ng/mL / x     / x     / x     / x                 Anemia Panel:  Vitamin B12, Serum: 427 pg/mL (04-29-21 @ 08:57)  Folate, Serum: 18.4 ng/mL (04-29-21 @ 08:57)      Thyroid Panel:  T4, Serum: 5.7 ug/dL (04-29-21 @ 08:57)  Thyroid Stimulating Hormone, Serum: 1.50 uIU/mL (04-29-21 @ 07:25)            Serum Pro-Brain Natriuretic Peptide: 87 pg/mL (04-28-21 @ 15:07)      RADIOLOGY & ADDITIONAL TESTS:      HEALTH ISSUES - PROBLEM Dx:  Pulmonary embolism, bilateral    Dementia    HTN (hypertension)    Asthma    Seasonal allergies    Need for prophylactic measure            Consultant(s) Notes Reviewed:  [ x ] YES     Care Discussed with [ x ] Consultants, [  ] Patient, [ x ] Family, [  ] HCP, [  ] , [ x ] Social Service, [ x ] RN, [  ] Physical Therapy, [  ] Palliative Care Team  DVT PPX: [  ] Lovenox, [  ] SC Heparin, [  ] Coumadin, [  ] Xarelto, [x  ] Eliquis, [  ] Pradaxa, [  ] IV Heparin drip, [  ] SCD, [  ] Ambulation, [  ] Contraindicated 2/2 GI Bleed, [  ] Contraindicated 2/2  Bleed, [  ] Contraindicated 2/2 Brain Bleed  Advanced Directive: [  ] None, [x  ] DNR/DNI Patient is a 80y old  Female who presents with a chief complaint of SOB (30 Apr 2021 16:33)      HPI:  79 yo female PMH dementia, HTN, asthma controlled, seasonal allergies presents to ED c/o SOB. Onset this AM. Hx obtained from daughter Yanelis Fraire as patient has hx of dementia with significant mental decline for past 1-2 months. Daughter states on Monday AM patient went to [facility] stating she was SOB. Neighbor called the ambulance and patient was transferred to [facility] and diagnosed with Covid,+ Rxed  with Regen, was observation  daughter does not know how patient got covid as patient is largely home bound with no visitors. Patient was d/c the same day (4/26) with albuterol inhaler and Losartan as pt was hypertensive. This AM, patient had similar episode where she went to [facility] and c/o SOB, so neighbor called ambulance and patient was transferred to [place]. Patient seen and examined at bedside, is a poor historian, A&Ox1 (person). Patient states she was SOB this AM but does not know why she is here. C/o chest tightness but denies cough, wheezing, CP, palpitations, fevers, chills, nausea, abd pain. When asked if patient was in hospital on Monday, she replied "I suppose so." Per daughter, patient with significant mental decline since March. Patient received the Pfizer  Covid vaccine  3/26   and since then has had a progressive decline in mental status 2nd dose Vaccine was given 4/16 , Prior to March patient was A&Ox3 and independent, however since then patient has had increased confusion, found wandering to [facility]. Patient does not have a PCP, has seen Dr. Clements once for pre-op clearance for cataract surgery 1 year ago. Daughter states they have been trying to make an appointment with a neurologist for her cognitive decline but have been unable to do so. Of note, patient has a history of hypotension and has never been on anti-hypertensive medications until Monday. Daughter states she was very surprised that her mother was found to be hypertensive and prescribed Losartan. Patient was given one dose yesterday.     In ED:  Vitals: T 98, HR 63, /76, RR 18, SpO2 100% on RA  Labs significant for: WBC 3.77, D-dimer 672, Cl 112, anion gap 2, AST 12, ALT 11  CTA: + acute bilateral pulmonary emboli, no evidence of R heart strain  CT Head: Small vessel ischemic changes in both hemispheres with volume loss and involutional change. No acute infarct or hemorrhagic lesion noted. Right frontal meningioma measuring approximately 1.1 cm.  EKG: NSR, rate 62, T wave abnormality   Given: 1L NS bolus x1, Albuterol inhaler x1, Lovenox 50mg x1  COVID 19-PCR -NEG       (28 Apr 2021 19:49)      INTERVAL HPI:  4/29/21: Pt admitted overnight for acute b/l PE. Pt seen and examined. Pt A&Ox1, unsure of why she is here. Pleasant, on RA and denies SOB. Has no acute complaints this AM. On lovenox 50 mg daily for PE. CXR no acute pulm disease, US duplex no dvt b/l. F/u TTE.   4/30/21: No acute overnight events. Per RN, pt did not sleep much throughout the night. Confused, tries to get out of bed but is redirectable and pleasant. Pt seen and examined, pt outside by nursing station as she keeps trying to climb out of bed. Pt confused, A&Ox1, but pleasant. She has no acute complaints. She is tolerating room air. Pt switched to eliquis 10 mg bid. TTE with no heart strain. PT recommending BENJAMIN.   5/1/21: No acute overnight events. Patient is very confused, but redirectable and pleasant. Mildly somnolent but responds to verbal and physical stimuli. A&Ox0-1 but pleasant. Tolerating room air. On Eliquis 10 mg BID. PT recommending BENJAMIN.  5/2/21: No acute overnight events. Pt is confused but alert, very pleasant and re-directable. Tolerating room air, on eliquis 10 mg BID. PT recommending BENJAMIN, pending SW.  5/3/21: No acute overnight events. Pt confused but alert, pleasant, sitting in bed comfortably eating breakfast. Tolerating room air, on eliquis 10 mg BID for PE. PT recommending BENJAMIN. Repeat COVID-19 PCR  positive, however per ID pt day 14 of illness and does not need isolation precautions.   5/4/21: No acute overnight events. Pt pleasant, confused, sitting up in bed in no acute distress. Tolerating room air, has no acute complaints. Discharge pending negative repeat COVID-19 PCR. F/u repeat COVID-19. Per ID no need for isolation precautions.       OVERNIGHT EVENTS: none     Home Medications:  losartan 50 mg oral tablet: 1 tab(s) orally once a day (28 Apr 2021 19:49)      MEDICATIONS  (STANDING):  apixaban 10 milliGRAM(s) Oral every 12 hours  loratadine 10 milliGRAM(s) Oral daily  losartan 50 milliGRAM(s) Oral daily  melatonin 3 milliGRAM(s) Oral at bedtime  pantoprazole    Tablet 40 milliGRAM(s) Oral before breakfast  potassium phosphate / sodium phosphate Tablet (K-PHOS No. 2) 1 Tablet(s) Oral four times a day    MEDICATIONS  (PRN):  ALBUTerol    90 MICROgram(s) HFA Inhaler 1 Puff(s) Inhalation every 4 hours PRN Shortness of Breath and/or Wheezing      Allergies    No Known Allergies    Intolerances        REVIEW OF SYSTEMS: "I feel great"  CONSTITUTIONAL: No fever, No chills, No fatigue, No myalgia, No Body ache, No Weakness  EYES: No eye pain,  No visual disturbances, No discharge, NO Redness  ENMT:  No ear pain, No nose bleed, No vertigo; No sinus or throat pain, No Congestion  NECK: No pain, No stiffness  RESPIRATORY: No cough, wheezing, No  hemoptysis, No shortness of breath  CARDIOVASCULAR: No chest pain, palpitations  GASTROINTESTINAL: No abdominal or epigastric pain. No nausea, No vomiting; No diarrhea or constipation. [  ] BM  GENITOURINARY: No dysuria, No frequency, No urgency, No hematuria, or incontinence  NEUROLOGICAL: No headaches, No dizziness, No numbness, No tingling, No tremors, No weakness  EXT: No Swelling, No Pain, No Edema  SKIN:  [ x ] No itching, burning, rashes, or lesions   MUSCULOSKELETAL: No joint pain or swelling; No muscle pain, No back pain, No extremity pain  PSYCHIATRIC: No depression, anxiety, mood swings or difficulty sleeping at night  PAIN SCALE: [x  ] None  [  ] Other-  ROS unreliable due to - [ x ] Dementia  [  ] Lethargy  [  ] Sedated [  ] non verbal  REST OF REVIEW Of SYSTEM - [ x ] Normal     Vital Signs Last 24 Hrs  T(C): 36.3 (04 May 2021 12:56), Max: 36.6 (03 May 2021 19:35)  T(F): 97.3 (04 May 2021 12:56), Max: 97.9 (03 May 2021 19:35)  HR: 65 (04 May 2021 12:56) (65 - 72)  BP: 108/72 (04 May 2021 12:56) (108/72 - 129/76)  BP(mean): --  RR: 18 (04 May 2021 12:56) (16 - 18)  SpO2: 98% (04 May 2021 12:56) (95% - 98%)  Finger Stick        05-03 @ 07:01  -  05-04 @ 07:00  --------------------------------------------------------  IN: 780 mL / OUT: 0 mL / NET: 780 mL        PHYSICAL EXAM:  GENERAL:  [ x ] NAD , [ x ] well appearing, [  ] Agitated, [  ] Drowsy,  [  ] Lethargy, [ x ] confused   HEAD:  [ x ] Normal, [  ] Other  EYES:  [x  ] EOMI, [ x ] PERRLA, [x  ] conjunctiva and sclera clear normal, [  ] Other,  [  ] Pallor,[  ] Discharge  ENMT:  [ x ] Normal, [x  ] Moist mucous membranes, [  ] Good dentition, [ x ] No Thrush  NECK:  [ x ] Supple, [ x ] No JVD, [ x ] Normal thyroid, [  ] Lymphadenopathy [  ] Other  CHEST/LUNG:  [ x ] Clear to auscultation bilaterally, [ x ] Breath Sounds clean   [x  ] No rales, [ x ] No rhonchi  [ x ]  No wheezing  HEART:  [ x ] Regular rate and rhythm, [  ] tachycardia, [  ] Bradycardia,  [  ] irregular  [ x ] No murmurs, No rubs, No gallops, [  ] PPM in place (Mfr:  )  ABDOMEN:  [x  ] Soft, [ x ] Nontender, [ x ] Nondistended, [ x ] No mass, [ x ] Bowel sounds present, [  ] obese  NERVOUS SYSTEM:  [ x ] Alert & Oriented X1, [ x ] Nonfocal  [ x ] Confusion  [  ] Encephalopathic [  ] Sedated [  ] Unable to assess, [ x ] Other-Dementia  EXTREMITIES: [ x ] 2+ Peripheral Pulses, No clubbing, No cyanosis,  [  ] edema B/L lower EXT. [  ] PVD stasis skin changes B/L Lower EXT  LYMPH: No lymphadenopathy noted  SKIN:  [ x ] No rashes or lesions, [  ] Pressure Ulcers, [  ] ecchymosis, [  ] Skin Tears, [  ] Other    DIET: Diet, Soft (04-28-21 @ 20:31) [Active]      LABS:                        12.0   4.58  )-----------( 183      ( 04 May 2021 08:01 )             36.1     04 May 2021 08:01    143    |  110    |  14     ----------------------------<  81     3.7     |  28     |  0.62     Ca    8.7        04 May 2021 08:01  Phos  4.1       04 May 2021 08:01  Mg     2.4       04 May 2021 08:01                    CARDIAC MARKERS ( 28 Apr 2021 15:07 )  <.015 ng/mL / x     / x     / x     / x                 Anemia Panel:  Vitamin B12, Serum: 427 pg/mL (04-29-21 @ 08:57)  Folate, Serum: 18.4 ng/mL (04-29-21 @ 08:57)      Thyroid Panel:  T4, Serum: 5.7 ug/dL (04-29-21 @ 08:57)  Thyroid Stimulating Hormone, Serum: 1.50 uIU/mL (04-29-21 @ 07:25)            Serum Pro-Brain Natriuretic Peptide: 87 pg/mL (04-28-21 @ 15:07)      RADIOLOGY & ADDITIONAL TESTS:      HEALTH ISSUES - PROBLEM Dx:  Pulmonary embolism, bilateral    Dementia    HTN (hypertension)    Asthma    Seasonal allergies    Need for prophylactic measure            Consultant(s) Notes Reviewed:  [ x ] YES     Care Discussed with [ x ] Consultants, [  ] Patient, [ x ] Family, [  ] HCP, [  ] , [ x ] Social Service, [ x ] RN, [  ] Physical Therapy, [  ] Palliative Care Team  DVT PPX: [  ] Lovenox, [  ] SC Heparin, [  ] Coumadin, [  ] Xarelto, [x  ] Eliquis, [  ] Pradaxa, [  ] IV Heparin drip, [  ] SCD, [  ] Ambulation, [  ] Contraindicated 2/2 GI Bleed, [  ] Contraindicated 2/2  Bleed, [  ] Contraindicated 2/2 Brain Bleed  Advanced Directive: [  ] None, [x  ] DNR/DNI

## 2021-05-04 NOTE — PROGRESS NOTE ADULT - SUBJECTIVE AND OBJECTIVE BOX
[facility] DIVISION of INFECTIOUS DISEASE  Sarmad Yen MD PhD, Cielo Paniagua MD, Elvie Wallace MD, Gianni Mandujano MD  and providing coverage with Naomi Lira MD and Darrell Mart MD  Providing Infectious Disease Consultations at [facility], [facility], [facility]'s      Office# [phone] to schedule follow up appointments  Answering Service for urgent calls or New Consults [phone]  Cell# to text for urgent issues Sarmad Yen [phone]     Infectious diseases progress note:    ANIRUDH RICHARDSON is a 80y y. o. Female patient    COVID Patient    Allergies    No Known Allergies    Intolerances        ANTIBIOTICS/RELEVANT:  antimicrobials    immunologic:    OTHER:  ALBUTerol    90 MICROgram(s) HFA Inhaler 1 Puff(s) Inhalation every 4 hours PRN  apixaban 10 milliGRAM(s) Oral every 12 hours  loratadine 10 milliGRAM(s) Oral daily  losartan 50 milliGRAM(s) Oral daily  melatonin 3 milliGRAM(s) Oral at bedtime  pantoprazole    Tablet 40 milliGRAM(s) Oral before breakfast  potassium phosphate / sodium phosphate Tablet (K-PHOS No. 2) 1 Tablet(s) Oral four times a day      Objective:  Vital Signs Last 24 Hrs  T(C): 36.6 (04 May 2021 04:27), Max: 36.6 (03 May 2021 19:35)  T(F): 97.9 (04 May 2021 04:27), Max: 97.9 (03 May 2021 19:35)  HR: 66 (04 May 2021 04:27) (66 - 72)  BP: 129/76 (04 May 2021 04:27) (123/67 - 129/76)  BP(mean): --  RR: 17 (04 May 2021 04:27) (16 - 17)  SpO2: 98% (04 May 2021 04:27) (95% - 98%)    T(C): 36.6 (05-04-21 @ 04:27), Max: 36.9 (05-03-21 @ 11:46)  T(C): 36.6 (05-04-21 @ 04:27), Max: 36.9 (05-03-21 @ 11:46)  T(C): 36.6 (05-04-21 @ 04:27), Max: 36.9 (05-03-21 @ 11:46)    PHYSICAL EXAM:  HEENT: NC atraumatic  Neck: supple  Respiratory: no accessory muscle use, breathing comfortably  Cardiovascular: distant  Gastrointestinal: normal appearing, nondistended  Extremities: no clubbing, no cyanosis,        LABS:                          12.0   4.58  )-----------( 183      ( 04 May 2021 08:01 )             36.1       4.58 05-04 @ 08:01  4.87 05-03 @ 06:44  5.71 05-02 @ 06:40  4.74 05-01 @ 08:23  4.37 04-30 @ 06:50  3.89 04-29 @ 07:25  3.77 04-28 @ 15:07      05-04    143  |  110<H>  |  14  ----------------------------<  81  3.7   |  28  |  0.62    Ca    8.7      04 May 2021 08:01  Phos  4.1     05-04  Mg     2.4     05-04        Creatinine, Serum: 0.62 mg/dL (05-04-21 @ 08:01)  Creatinine, Serum: 0.74 mg/dL (05-03-21 @ 06:44)  Creatinine, Serum: 0.88 mg/dL (05-02-21 @ 06:40)  Creatinine, Serum: 0.67 mg/dL (05-01-21 @ 08:23)  Creatinine, Serum: 0.67 mg/dL (04-30-21 @ 06:50)  Creatinine, Serum: 0.47 mg/dL (04-29-21 @ 07:25)  Creatinine, Serum: 0.65 mg/dL (04-28-21 @ 15:07)                COVID RISK SCORE  Auto Neutrophil #: 1.61 K/uL (04-29-21 @ 07:25)  Auto Lymphocyte #: 1.45 K/uL (04-29-21 @ 07:25)  Auto Neutrophil #: 1.56 K/uL (04-28-21 @ 15:07)  Auto Lymphocyte #: 1.39 K/uL (04-28-21 @ 15:07)      Auto Eosinophil #: 0.35 K/uL (04-29-21 @ 07:25)  Auto Eosinophil #: 0.34 K/uL (04-28-21 @ 15:07)          Troponin I, Serum: <.015 ng/mL (04-28-21 @ 15:07)                INR: 1.14 ratio (04-29-21 @ 07:25)  Activated Partial Thromboplastin Time: 36.8 sec (04-29-21 @ 07:25)    D-Dimer Assay, Quantitative: 672 ng/mL DDU (04-28-21 @ 15:07)        MICROBIOLOGY:              RADIOLOGY & ADDITIONAL STUDIES:

## 2021-05-04 NOTE — PROGRESS NOTE ADULT - ATTENDING COMMENTS
79 yo female PMH dementia, HTN, asthma controlled, seasonal allergies presents to ED c/o SOB, onset this AM. Patient discharged from [facility] on Monday 4/16 and diagnosed with Covid at that time. Patient admitted for acute bilat pulmonary emboli found on CTA.  pt seen, examined, case & care plan d/w, pt 's Dtr, residents at detail.  D/W Dr Bah follow up  D/W dtr today , PT----> BENJAMIN d/c planning, COVID PCR +   NO Need for isolation as d/w DR Yen.  PO diet  D/W Social service ----> Awaiting Auth. BENJAMIN & COVID -Neg PCR for BENJAMIN  Total care time is 40 minutes.

## 2021-05-05 LAB
ANION GAP SERPL CALC-SCNC: 5 MMOL/L — SIGNIFICANT CHANGE UP (ref 5–17)
BUN SERPL-MCNC: 17 MG/DL — SIGNIFICANT CHANGE UP (ref 7–23)
CALCIUM SERPL-MCNC: 8.4 MG/DL — LOW (ref 8.5–10.1)
CHLORIDE SERPL-SCNC: 113 MMOL/L — HIGH (ref 96–108)
CO2 SERPL-SCNC: 26 MMOL/L — SIGNIFICANT CHANGE UP (ref 22–31)
CREAT SERPL-MCNC: 0.6 MG/DL — SIGNIFICANT CHANGE UP (ref 0.5–1.3)
GLUCOSE SERPL-MCNC: 83 MG/DL — SIGNIFICANT CHANGE UP (ref 70–99)
HCT VFR BLD CALC: 35.8 % — SIGNIFICANT CHANGE UP (ref 34.5–45)
HGB BLD-MCNC: 11.8 G/DL — SIGNIFICANT CHANGE UP (ref 11.5–15.5)
MAGNESIUM SERPL-MCNC: 2.3 MG/DL — SIGNIFICANT CHANGE UP (ref 1.6–2.6)
MCHC RBC-ENTMCNC: 31.2 PG — SIGNIFICANT CHANGE UP (ref 27–34)
MCHC RBC-ENTMCNC: 33 GM/DL — SIGNIFICANT CHANGE UP (ref 32–36)
MCV RBC AUTO: 94.7 FL — SIGNIFICANT CHANGE UP (ref 80–100)
NRBC # BLD: 0 /100 WBCS — SIGNIFICANT CHANGE UP (ref 0–0)
PHOSPHATE SERPL-MCNC: 2.9 MG/DL — SIGNIFICANT CHANGE UP (ref 2.5–4.5)
PLATELET # BLD AUTO: 171 K/UL — SIGNIFICANT CHANGE UP (ref 150–400)
POTASSIUM SERPL-MCNC: 3.8 MMOL/L — SIGNIFICANT CHANGE UP (ref 3.5–5.3)
POTASSIUM SERPL-SCNC: 3.8 MMOL/L — SIGNIFICANT CHANGE UP (ref 3.5–5.3)
RBC # BLD: 3.78 M/UL — LOW (ref 3.8–5.2)
RBC # FLD: 13.2 % — SIGNIFICANT CHANGE UP (ref 10.3–14.5)
SODIUM SERPL-SCNC: 144 MMOL/L — SIGNIFICANT CHANGE UP (ref 135–145)
WBC # BLD: 4.54 K/UL — SIGNIFICANT CHANGE UP (ref 3.8–10.5)
WBC # FLD AUTO: 4.54 K/UL — SIGNIFICANT CHANGE UP (ref 3.8–10.5)

## 2021-05-05 RX ADMIN — Medication 1 TABLET(S): at 21:27

## 2021-05-05 RX ADMIN — Medication 3 MILLIGRAM(S): at 21:27

## 2021-05-05 RX ADMIN — Medication 1 TABLET(S): at 12:13

## 2021-05-05 RX ADMIN — LORATADINE 10 MILLIGRAM(S): 10 TABLET ORAL at 12:13

## 2021-05-05 RX ADMIN — APIXABAN 10 MILLIGRAM(S): 2.5 TABLET, FILM COATED ORAL at 08:53

## 2021-05-05 NOTE — PROGRESS NOTE ADULT - SUBJECTIVE AND OBJECTIVE BOX
Patient is a 80y old  Female who presents with a chief complaint of SOB (30 Apr 2021 16:33)      HPI:  81 yo female PMH dementia, HTN, asthma controlled, seasonal allergies presents to ED c/o SOB. Onset this AM. Hx obtained from daughter Yanelis Fraire as patient has hx of dementia with significant mental decline for past 1-2 months. Daughter states on Monday AM patient went to [facility] stating she was SOB. Neighbor called the ambulance and patient was transferred to [facility] and diagnosed with Covid,+ Rxed  with Regen, was observation  daughter does not know how patient got covid as patient is largely home bound with no visitors. Patient was d/c the same day (4/26) with albuterol inhaler and Losartan as pt was hypertensive. This AM, patient had similar episode where she went to [facility] and c/o SOB, so neighbor called ambulance and patient was transferred to [place]. Patient seen and examined at bedside, is a poor historian, A&Ox1 (person). Patient states she was SOB this AM but does not know why she is here. C/o chest tightness but denies cough, wheezing, CP, palpitations, fevers, chills, nausea, abd pain. When asked if patient was in hospital on Monday, she replied "I suppose so." Per daughter, patient with significant mental decline since March. Patient received the Pfizer  Covid vaccine  3/26   and since then has had a progressive decline in mental status 2nd dose Vaccine was given 4/16 , Prior to March patient was A&Ox3 and independent, however since then patient has had increased confusion, found wandering to [facility]. Patient does not have a PCP, has seen Dr. Clements once for pre-op clearance for cataract surgery 1 year ago. Daughter states they have been trying to make an appointment with a neurologist for her cognitive decline but have been unable to do so. Of note, patient has a history of hypotension and has never been on anti-hypertensive medications until Monday. Daughter states she was very surprised that her mother was found to be hypertensive and prescribed Losartan. Patient was given one dose yesterday.     In ED:  Vitals: T 98, HR 63, /76, RR 18, SpO2 100% on RA  Labs significant for: WBC 3.77, D-dimer 672, Cl 112, anion gap 2, AST 12, ALT 11  CTA: + acute bilateral pulmonary emboli, no evidence of R heart strain  CT Head: Small vessel ischemic changes in both hemispheres with volume loss and involutional change. No acute infarct or hemorrhagic lesion noted. Right frontal meningioma measuring approximately 1.1 cm.  EKG: NSR, rate 62, T wave abnormality   Given: 1L NS bolus x1, Albuterol inhaler x1, Lovenox 50mg x1  COVID 19-PCR -NEG       (28 Apr 2021 19:49)      INTERVAL HPI:  4/29/21: Pt admitted overnight for acute b/l PE. Pt seen and examined. Pt A&Ox1, unsure of why she is here. Pleasant, on RA and denies SOB. Has no acute complaints this AM. On lovenox 50 mg daily for PE. CXR no acute pulm disease, US duplex no dvt b/l. F/u TTE.   4/30/21: No acute overnight events. Per RN, pt did not sleep much throughout the night. Confused, tries to get out of bed but is redirectable and pleasant. Pt seen and examined, pt outside by nursing station as she keeps trying to climb out of bed. Pt confused, A&Ox1, but pleasant. She has no acute complaints. She is tolerating room air. Pt switched to eliquis 10 mg bid. TTE with no heart strain. PT recommending BENJAMIN.   5/1/21: No acute overnight events. Patient is very confused, but redirectable and pleasant. Mildly somnolent but responds to verbal and physical stimuli. A&Ox0-1 but pleasant. Tolerating room air. On Eliquis 10 mg BID. PT recommending BENJAMIN.  5/2/21: No acute overnight events. Pt is confused but alert, very pleasant and re-directable. Tolerating room air, on eliquis 10 mg BID. PT recommending BENJAMIN, pending SW.  5/3/21: No acute overnight events. Pt confused but alert, pleasant, sitting in bed comfortably eating breakfast. Tolerating room air, on eliquis 10 mg BID for PE. PT recommending BENJAMIN. Repeat COVID-19 PCR  positive, however per ID pt day 14 of illness and does not need isolation precautions.   5/4/21: No acute overnight events. Pt pleasant, confused, sitting up in bed in no acute distress. Tolerating room air, has no acute complaints. Discharge pending negative repeat COVID-19 PCR. F/u repeat COVID-19. Per ID no need for isolation precautions.   5/5/21: No acute overnight events. Pt seen and examined. Pleasant, confused, thinks she's at home. Laying in bed in no acute distress. Tolerating room air. No acute complaints. Discharge pending for BENJAMIN. Per ID no need for isolation precautions.       OVERNIGHT EVENTS: none     Home Medications:  losartan 50 mg oral tablet: 1 tab(s) orally once a day (28 Apr 2021 19:49)      MEDICATIONS  (STANDING):  apixaban 10 milliGRAM(s) Oral every 12 hours  loratadine 10 milliGRAM(s) Oral daily  losartan 50 milliGRAM(s) Oral daily  melatonin 3 milliGRAM(s) Oral at bedtime  pantoprazole    Tablet 40 milliGRAM(s) Oral before breakfast  potassium phosphate / sodium phosphate Tablet (K-PHOS No. 2) 1 Tablet(s) Oral four times a day    MEDICATIONS  (PRN):  ALBUTerol    90 MICROgram(s) HFA Inhaler 1 Puff(s) Inhalation every 4 hours PRN Shortness of Breath and/or Wheezing      Allergies    No Known Allergies    Intolerances        REVIEW OF SYSTEMS: "I feel good"  CONSTITUTIONAL: No fever, No chills, No fatigue, No myalgia, No Body ache, No Weakness  EYES: No eye pain,  No visual disturbances, No discharge, NO Redness  ENMT:  No ear pain, No nose bleed, No vertigo; No sinus or throat pain, No Congestion  NECK: No pain, No stiffness  RESPIRATORY: No cough, wheezing, No  hemoptysis, No shortness of breath  CARDIOVASCULAR: No chest pain, palpitations  GASTROINTESTINAL: No abdominal or epigastric pain. No nausea, No vomiting; No diarrhea or constipation. [  ] BM  GENITOURINARY: No dysuria, No frequency, No urgency, No hematuria, or incontinence  NEUROLOGICAL: No headaches, No dizziness, No numbness, No tingling, No tremors, No weakness  EXT: No Swelling, No Pain, No Edema  SKIN:  [ x ] No itching, burning, rashes, or lesions   MUSCULOSKELETAL: No joint pain or swelling; No muscle pain, No back pain, No extremity pain  PSYCHIATRIC: No depression, anxiety, mood swings or difficulty sleeping at night  PAIN SCALE: [x  ] None  [  ] Other-  ROS unreliable due to - [ x ] Dementia  [  ] Lethargy  [  ] Sedated [  ] non verbal  REST OF REVIEW Of SYSTEM - [ x ] Normal       Vital Signs Last 24 Hrs  T(C): 37.1 (04 May 2021 19:31), Max: 37.1 (04 May 2021 19:31)  T(F): 98.8 (04 May 2021 19:31), Max: 98.8 (04 May 2021 19:31)  HR: 73 (04 May 2021 19:31) (65 - 73)  BP: 103/69 (04 May 2021 19:31) (103/69 - 108/72)  BP(mean): --  RR: 18 (04 May 2021 19:31) (18 - 18)  SpO2: 98% (04 May 2021 19:31) (98% - 98%)  Finger Stick          PHYSICAL EXAM:  GENERAL:  [ x ] NAD , [ x ] well appearing, [  ] Agitated, [  ] Drowsy,  [  ] Lethargy, [ x ] confused   HEAD:  [ x ] Normal, [  ] Other  EYES:  [x  ] EOMI, [ x ] PERRLA, [x  ] conjunctiva and sclera clear normal, [  ] Other,  [  ] Pallor,[  ] Discharge  ENMT:  [ x ] Normal, [x  ] Moist mucous membranes, [  ] Good dentition, [ x ] No Thrush  NECK:  [ x ] Supple, [ x ] No JVD, [ x ] Normal thyroid, [  ] Lymphadenopathy [  ] Other  CHEST/LUNG:  [ x ] Clear to auscultation bilaterally,  [x  ] No rales, [ x ] No rhonchi  [ x ]  No wheezing  HEART:  [ x ] Regular rate and rhythm, [  ] tachycardia, [  ] Bradycardia,  [  ] irregular  [ x ] No murmurs, No rubs, No gallops, [  ] PPM in place (Mfr:  )  ABDOMEN:  [x  ] Soft, [ x ] Nontender, [ x ] Nondistended, [ x ] No mass, [ x ] Bowel sounds present, [  ] obese  NERVOUS SYSTEM:  [ x ] Alert & Oriented X1, [ x ] Nonfocal  [ x ] Confusion  [  ] Encephalopathic [  ] Sedated [  ] Unable to assess, [ x ] Other-Dementia  EXTREMITIES: [ x ] 2+ Peripheral Pulses, No clubbing, No cyanosis,  [  ] edema B/L lower EXT. [  ] PVD stasis skin changes B/L Lower EXT  LYMPH: No lymphadenopathy noted  SKIN:  [ x ] No rashes or lesions, [  ] Pressure Ulcers, [  ] ecchymosis, [  ] Skin Tears, [  ] Other    DIET: Diet, Soft (04-28-21 @ 20:31) [Active]      LABS:                        11.8   4.54  )-----------( 171      ( 05 May 2021 09:48 )             35.8     05 May 2021 09:48    144    |  113    |  17     ----------------------------<  83     3.8     |  26     |  0.60     Ca    8.4        05 May 2021 09:48  Phos  2.9       05 May 2021 09:48  Mg     2.3       05 May 2021 09:48                    CARDIAC MARKERS ( 28 Apr 2021 15:07 )  <.015 ng/mL / x     / x     / x     / x                 Anemia Panel:  Vitamin B12, Serum: 427 pg/mL (04-29-21 @ 08:57)  Folate, Serum: 18.4 ng/mL (04-29-21 @ 08:57)      Thyroid Panel:  T4, Serum: 5.7 ug/dL (04-29-21 @ 08:57)  Thyroid Stimulating Hormone, Serum: 1.50 uIU/mL (04-29-21 @ 07:25)            Serum Pro-Brain Natriuretic Peptide: 87 pg/mL (04-28-21 @ 15:07)      RADIOLOGY & ADDITIONAL TESTS: none in past 24 hours       HEALTH ISSUES - PROBLEM Dx:  Pulmonary embolism, bilateral    Dementia    HTN (hypertension)    Asthma    Seasonal allergies    Need for prophylactic measure    COVID-19 virus detected            Consultant(s) Notes Reviewed:  [ x ] YES     Care Discussed with [ x ] Consultants, [  ] Patient, [ x ] Family, [  ] HCP, [  ] , [ x ] Social Service, [ x ] RN, [  ] Physical Therapy, [  ] Palliative Care Team  DVT PPX: [  ] Lovenox, [  ] SC Heparin, [  ] Coumadin, [  ] Xarelto, [x  ] Eliquis, [  ] Pradaxa, [  ] IV Heparin drip, [  ] SCD, [  ] Ambulation, [  ] Contraindicated 2/2 GI Bleed, [  ] Contraindicated 2/2  Bleed, [  ] Contraindicated 2/2 Brain Bleed  Advanced Directive: [  ] None, [x  ] DNR/DNI   Patient is a 80y old  Female who presents with a chief complaint of SOB (30 Apr 2021 16:33)      HPI:  79 yo female PMH dementia, HTN, asthma controlled, seasonal allergies presents to ED c/o SOB. Onset this AM. Hx obtained from daughter Yanelis Fraire as patient has hx of dementia with significant mental decline for past 1-2 months. Daughter states on Monday AM patient went to [facility] stating she was SOB. Neighbor called the ambulance and patient was transferred to [facility] and diagnosed with Covid,+ Rxed  with Regen, was observation  daughter does not know how patient got covid as patient is largely home bound with no visitors. Patient was d/c the same day (4/26) with albuterol inhaler and Losartan as pt was hypertensive. This AM, patient had similar episode where she went to [facility] and c/o SOB, so neighbor called ambulance and patient was transferred to [place]. Patient seen and examined at bedside, is a poor historian, A&Ox1 (person). Patient states she was SOB this AM but does not know why she is here. C/o chest tightness but denies cough, wheezing, CP, palpitations, fevers, chills, nausea, abd pain. When asked if patient was in hospital on Monday, she replied "I suppose so." Per daughter, patient with significant mental decline since March. Patient received the Pfizer  Covid vaccine  3/26   and since then has had a progressive decline in mental status 2nd dose Vaccine was given 4/16 , Prior to March patient was A&Ox3 and independent, however since then patient has had increased confusion, found wandering [facility] [place]. Patient does not have a PCP, has seen Dr. Clements once for pre-op clearance for cataract surgery 1 year ago. Daughter states they have been trying to make an appointment with a neurologist for her cognitive decline but have been unable to do so. Of note, patient has a history of hypotension and has never been on anti-hypertensive medications until Monday. Daughter states she was very surprised that her mother was found to be hypertensive and prescribed Losartan. Patient was given one dose yesterday.     In ED:  Vitals: T 98, HR 63, /76, RR 18, SpO2 100% on RA  Labs significant for: WBC 3.77, D-dimer 672, Cl 112, anion gap 2, AST 12, ALT 11  CTA: + acute bilateral pulmonary emboli, no evidence of R heart strain  CT Head: Small vessel ischemic changes in both hemispheres with volume loss and involutional change. No acute infarct or hemorrhagic lesion noted. Right frontal meningioma measuring approximately 1.1 cm.  EKG: NSR, rate 62, T wave abnormality   Given: 1L NS bolus x1, Albuterol inhaler x1, Lovenox 50mg x1  COVID 19-PCR -NEG       (28 Apr 2021 19:49)      INTERVAL HPI:  4/29/21: Pt admitted overnight for acute b/l PE. Pt seen and examined. Pt A&Ox1, unsure of why she is here. Pleasant, on RA and denies SOB. Has no acute complaints this AM. On lovenox 50 mg daily for PE. CXR no acute pulm disease, US duplex no dvt b/l. F/u TTE.   4/30/21: No acute overnight events. Per RN, pt did not sleep much throughout the night. Confused, tries to get out of bed but is redirectable and pleasant. Pt seen and examined, pt outside by nursing station as she keeps trying to climb out of bed. Pt confused, A&Ox1, but pleasant. She has no acute complaints. She is tolerating room air. Pt switched to eliquis 10 mg bid. TTE with no heart strain. PT recommending BENJAMIN.   5/1/21: No acute overnight events. Patient is very confused, but redirectable and pleasant. Mildly somnolent but responds to verbal and physical stimuli. A&Ox0-1 but pleasant. Tolerating room air. On Eliquis 10 mg BID. PT recommending BENJAMIN.  5/2/21: No acute overnight events. Pt is confused but alert, very pleasant and re-directable. Tolerating room air, on eliquis 10 mg BID. PT recommending BENJAMIN, pending SW.  5/3/21: No acute overnight events. Pt confused but alert, pleasant, sitting in bed comfortably eating breakfast. Tolerating room air, on eliquis 10 mg BID for PE. PT recommending BENJAMIN. Repeat COVID-19 PCR  positive, however per ID pt day 14 of illness and does not need isolation precautions.   5/4/21: No acute overnight events. Pt pleasant, confused, sitting up in bed in no acute distress. Tolerating room air, has no acute complaints. Discharge pending negative repeat COVID-19 PCR. F/u repeat COVID-19. Per ID no need for isolation precautions.   5/5/21: No acute overnight events. Pt seen and examined. Pleasant, confused, thinks she's at home. Laying in bed in no acute distress. Tolerating room air. No acute complaints. Discharge pending for BENJAMIN. Per ID no need for isolation precautions. Ambulation      OVERNIGHT EVENTS: none     Home Medications:  losartan 50 mg oral tablet: 1 tab(s) orally once a day (28 Apr 2021 19:49)      MEDICATIONS  (STANDING):  apixaban 10 milliGRAM(s) Oral every 12 hours  loratadine 10 milliGRAM(s) Oral daily  losartan 50 milliGRAM(s) Oral daily  melatonin 3 milliGRAM(s) Oral at bedtime  pantoprazole    Tablet 40 milliGRAM(s) Oral before breakfast  potassium phosphate / sodium phosphate Tablet (K-PHOS No. 2) 1 Tablet(s) Oral four times a day    MEDICATIONS  (PRN):  ALBUTerol    90 MICROgram(s) HFA Inhaler 1 Puff(s) Inhalation every 4 hours PRN Shortness of Breath and/or Wheezing      Allergies    No Known Allergies    Intolerances        REVIEW OF SYSTEMS: "I feel good"  CONSTITUTIONAL: No fever, No chills, No fatigue, No myalgia, No Body ache, No Weakness  EYES: No eye pain,  No visual disturbances, No discharge, NO Redness  ENMT:  No ear pain, No nose bleed, No vertigo; No sinus or throat pain, No Congestion  NECK: No pain, No stiffness  RESPIRATORY: No cough, wheezing, No  hemoptysis, No shortness of breath  CARDIOVASCULAR: No chest pain, palpitations  GASTROINTESTINAL: No abdominal or epigastric pain. No nausea, No vomiting; No diarrhea or constipation. [  ] BM  GENITOURINARY: No dysuria, No frequency, No urgency, No hematuria, or incontinence  NEUROLOGICAL: No headaches, No dizziness, No numbness, No tingling, No tremors, No weakness  EXT: No Swelling, No Pain, No Edema  SKIN:  [ x ] No itching, burning, rashes, or lesions   MUSCULOSKELETAL: No joint pain or swelling; No muscle pain, No back pain, No extremity pain  PSYCHIATRIC: No depression, anxiety, mood swings or difficulty sleeping at night  PAIN SCALE: [x  ] None  [  ] Other-  ROS unreliable due to - [ x ] Dementia  [  ] Lethargy  [  ] Sedated [  ] non verbal  REST OF REVIEW Of SYSTEM - [ x ] Normal       Vital Signs Last 24 Hrs  T(C): 37.1 (04 May 2021 19:31), Max: 37.1 (04 May 2021 19:31)  T(F): 98.8 (04 May 2021 19:31), Max: 98.8 (04 May 2021 19:31)  HR: 73 (04 May 2021 19:31) (65 - 73)  BP: 103/69 (04 May 2021 19:31) (103/69 - 108/72)  BP(mean): --  RR: 18 (04 May 2021 19:31) (18 - 18)  SpO2: 98% (04 May 2021 19:31) (98% - 98%)  Finger Stick          PHYSICAL EXAM:  GENERAL:  [ x ] NAD , [ x ] well appearing, [  ] Agitated, [  ] Drowsy,  [  ] Lethargy, [ x ] confused   HEAD:  [ x ] Normal, [  ] Other  EYES:  [x  ] EOMI, [ x ] PERRLA, [x  ] conjunctiva and sclera clear normal, [  ] Other,  [  ] Pallor,[  ] Discharge  ENMT:  [ x ] Normal, [x  ] Moist mucous membranes, [  ] Good dentition, [ x ] No Thrush  NECK:  [ x ] Supple, [ x ] No JVD, [ x ] Normal thyroid, [  ] Lymphadenopathy [  ] Other  CHEST/LUNG:  [ x ] Clear to auscultation bilaterally,  [x  ] No rales, [ x ] No rhonchi  [ x ]  No wheezing  HEART:  [ x ] Regular rate and rhythm, [  ] tachycardia, [  ] Bradycardia,  [  ] irregular  [ x ] No murmurs, No rubs, No gallops, [  ] PPM in place (Mfr:  )  ABDOMEN:  [x  ] Soft, [ x ] Nontender, [ x ] Nondistended, [ x ] No mass, [ x ] Bowel sounds present, [  ] obese  NERVOUS SYSTEM:  [ x ] Alert & Oriented X1, [ x ] Nonfocal  [ x ] Confusion  [  ] Encephalopathic [  ] Sedated [  ] Unable to assess, [ x ] Other-Dementia  EXTREMITIES: [ x ] 2+ Peripheral Pulses, No clubbing, No cyanosis,  [  ] edema B/L lower EXT. [  ] PVD stasis skin changes B/L Lower EXT  LYMPH: No lymphadenopathy noted  SKIN:  [ x ] No rashes or lesions, [  ] Pressure Ulcers, [  ] ecchymosis, [  ] Skin Tears, [  ] Other    DIET: Diet, Soft (04-28-21 @ 20:31) [Active]      LABS:                        11.8   4.54  )-----------( 171      ( 05 May 2021 09:48 )             35.8     05 May 2021 09:48    144    |  113    |  17     ----------------------------<  83     3.8     |  26     |  0.60     Ca    8.4        05 May 2021 09:48  Phos  2.9       05 May 2021 09:48  Mg     2.3       05 May 2021 09:48      CARDIAC MARKERS ( 28 Apr 2021 15:07 )  <.015 ng/mL / x     / x     / x     / x        Anemia Panel:  Vitamin B12, Serum: 427 pg/mL (04-29-21 @ 08:57)  Folate, Serum: 18.4 ng/mL (04-29-21 @ 08:57)      Thyroid Panel:  T4, Serum: 5.7 ug/dL (04-29-21 @ 08:57)  Thyroid Stimulating Hormone, Serum: 1.50 uIU/mL (04-29-21 @ 07:25)    Serum Pro-Brain Natriuretic Peptide: 87 pg/mL (04-28-21 @ 15:07)      RADIOLOGY & ADDITIONAL TESTS: none in past 24 hours       HEALTH ISSUES - PROBLEM Dx:  Pulmonary embolism, bilateral    Dementia    HTN (hypertension)    Asthma    Seasonal allergies    Need for prophylactic measure    COVID-19 virus detected      Consultant(s) Notes Reviewed:  [ x ] YES     Care Discussed with [ x ] Consultants, [ x ] Patient, [ x ] Family, [  ] HCP, [  ] , [ x ] Social Service, [ x ] RN, [  ] Physical Therapy, [  ] Palliative Care Team  DVT PPX: [  ] Lovenox, [  ] SC Heparin, [  ] Coumadin, [  ] Xarelto, [x  ] Eliquis, [  ] Pradaxa, [  ] IV Heparin drip, [  ] SCD, [  ] Ambulation, [  ] Contraindicated 2/2 GI Bleed, [  ] Contraindicated 2/2  Bleed, [  ] Contraindicated 2/2 Brain Bleed  Advanced Directive: [  ] None, [x  ] DNR/DNI

## 2021-05-05 NOTE — DIETITIAN INITIAL EVALUATION ADULT. - ADD RECOMMEND
1. continue soft diet will add mighty shake daily 2. recommend add MVI 3. will follow weights, labs, PO intake

## 2021-05-05 NOTE — DIETITIAN INITIAL EVALUATION ADULT. - OTHER INFO
80 year old female Dx pulmonary embolism bilateral PMH asthma HTN dementia  NKFA per admit papers MOLST DNR   patient seen in bed confused. per RN patient with good PO intake flow sheet shows % of meals taken  patient appears thin eating well at this time

## 2021-05-05 NOTE — PROGRESS NOTE ADULT - SUBJECTIVE AND OBJECTIVE BOX
Date/Time Patient Seen:  		  Referring MD:   Data Reviewed	       Patient is a 80y old  Female who presents with a chief complaint of SOB (30 Apr 2021 16:33)      Subjective/HPI     PAST MEDICAL & SURGICAL HISTORY:  No pertinent past medical history    Uncomplicated asthma, unspecified asthma severity  past history - currently stable with no medications    HTN (hypertension)    No significant past surgical history    S/P cataract surgery    History of partial hysterectomy          Medication list         MEDICATIONS  (STANDING):  apixaban 10 milliGRAM(s) Oral every 12 hours  loratadine 10 milliGRAM(s) Oral daily  losartan 50 milliGRAM(s) Oral daily  melatonin 3 milliGRAM(s) Oral at bedtime  pantoprazole    Tablet 40 milliGRAM(s) Oral before breakfast  potassium phosphate / sodium phosphate Tablet (K-PHOS No. 2) 1 Tablet(s) Oral four times a day    MEDICATIONS  (PRN):  ALBUTerol    90 MICROgram(s) HFA Inhaler 1 Puff(s) Inhalation every 4 hours PRN Shortness of Breath and/or Wheezing         Vitals log        ICU Vital Signs Last 24 Hrs  T(C): 37.1 (04 May 2021 19:31), Max: 37.1 (04 May 2021 19:31)  T(F): 98.8 (04 May 2021 19:31), Max: 98.8 (04 May 2021 19:31)  HR: 73 (04 May 2021 19:31) (65 - 73)  BP: 103/69 (04 May 2021 19:31) (103/69 - 108/72)  BP(mean): --  ABP: --  ABP(mean): --  RR: 18 (04 May 2021 19:31) (18 - 18)  SpO2: 98% (04 May 2021 19:31) (98% - 98%)           Input and Output:  I&O's Detail    03 May 2021 07:01  -  04 May 2021 07:00  --------------------------------------------------------  IN:    Oral Fluid: 780 mL  Total IN: 780 mL    OUT:  Total OUT: 0 mL    Total NET: 780 mL          Lab Data                        12.0   4.58  )-----------( 183      ( 04 May 2021 08:01 )             36.1     05-04    143  |  110<H>  |  14  ----------------------------<  81  3.7   |  28  |  0.62    Ca    8.7      04 May 2021 08:01  Phos  4.1     05-04  Mg     2.4     05-04              Review of Systems	      Objective     Physical Examination    heart s1s2  lung dec BS  abd soft  head nc  on RA      Pertinent Lab findings & Imaging      Marta:  NO   Adequate UO     I&O's Detail    03 May 2021 07:01  -  04 May 2021 07:00  --------------------------------------------------------  IN:    Oral Fluid: 780 mL  Total IN: 780 mL    OUT:  Total OUT: 0 mL    Total NET: 780 mL               Discussed with:     Cultures:	        Radiology

## 2021-05-05 NOTE — PROGRESS NOTE ADULT - ATTENDING COMMENTS
81 yo female PMH dementia, HTN, asthma controlled, seasonal allergies presents to ED c/o SOB, onset this AM. Patient discharged from [facility] on Monday 4/16 and diagnosed with Covid at that time. Patient admitted for acute bilat pulmonary emboli found on CTA.  pt seen, examined, case & care plan d/w, pt 's Dtr, residents at detail.  D/W Dr Bah follow up  D/W dtr today , PT----> BENJAMIN d/c planning, COVID PCR +   NO Need for isolation as d/w DR Yen.  PO diet  D/W Social service ----> Awaiting Auth. BENJAMIN & COVID -Neg PCR for BENJAMIN, PT follow up as pt is ambulating.  Total care time is 40 minutes. Statement Selected

## 2021-05-05 NOTE — PROGRESS NOTE ADULT - SUBJECTIVE AND OBJECTIVE BOX
Neurology Follow up note    ANIRUDH RICHARDSON80yFemale    HPI:  79 yo female PMH dementia, HTN, asthma controlled, seasonal allergies presents to ED c/o SOB. Onset this AM. Hx obtained from daughter Yanelis Fraire as patient has hx of dementia with significant mental decline for past 1-2 months. Daughter states on Monday AM patient went to neighbors house stating she was SOB. Neighbor called the ambulance and patient was transferred to [facility] and diagnosed with Covid,+ Rxed  with Regen, was observation  daughter does not know how patient got covid as patient is largely home bound with no visitors. Patient was d/c the same day (4/26) with albuterol inhaler and Losartan as pt was hypertensive. This AM, patient had similar episode where she went to [facility] and c/o SOB, so neighbor called ambulance and patient was transferred to [place]. Patient seen and examined at bedside, is a poor historian, A&Ox1 (person). Patient states she was SOB this AM but does not know why she is here. C/o chest tightness but denies cough, wheezing, CP, palpitations, fevers, chills, nausea, abd pain. When asked if patient was in hospital on Monday, she replied "I suppose so." Per daughter, patient with significant mental decline since March. Patient received the Pfizer  Covid vaccine  3/26   and since then has had a progressive decline in mental status 2nd dose Vaccine was given 4/16 , Prior to March patient was A&Ox3 and independent, however since then patient has had increased confusion, found wandering to [facility]. Patient does not have a PCP, has seen Dr. Clements once for pre-op clearance for cataract surgery 1 year ago. Daughter states they have been trying to make an appointment with a neurologist for her cognitive decline but have been unable to do so. Of note, patient has a history of hypotension and has never been on anti-hypertensive medications until Monday. Daughter states she was very surprised that her mother was found to be hypertensive and prescribed Losartan. Patient was given one dose yesterday.     In ED:  Vitals: T 98, HR 63, /76, RR 18, SpO2 100% on RA  Labs significant for: WBC 3.77, D-dimer 672, Cl 112, anion gap 2, AST 12, ALT 11  CTA: + acute bilateral pulmonary emboli, no evidence of R heart strain  CT Head: Small vessel ischemic changes in both hemispheres with volume loss and involutional change. No acute infarct or hemorrhagic lesion noted. Right frontal meningioma measuring approximately 1.1 cm.  EKG: NSR, rate 62, T wave abnormality   Given: 1L NS bolus x1, Albuterol inhaler x1, Lovenox 50mg x1  COVID 19-PCR -NEG       (28 Apr 2021 19:49)      Interval History - no complaints    Patient is seen, chart was reviewed and case was discussed with the treatment team.  Pt is not in any distress.   Lying on bed comfortably.     Vital Signs Last 24 Hrs  T(C): 36.5 (05 May 2021 12:08), Max: 37.1 (04 May 2021 19:31)  T(F): 97.7 (05 May 2021 12:08), Max: 98.8 (04 May 2021 19:31)  HR: 68 (05 May 2021 12:08) (68 - 73)  BP: 128/73 (05 May 2021 12:08) (103/69 - 128/73)  BP(mean): --  RR: 18 (05 May 2021 12:08) (18 - 18)  SpO2: 92% (05 May 2021 12:08) (92% - 98%)        REVIEW OF SYSTEMS:    Constitutional: No fever,   Eyes: No eye pain, visual disturbances, or discharge  ENT:  No difficulty hearing, tinnitus, vertigo; No sinus or throat pain  Neck: No pain or stiffness  Respiratory: No , wheezing, chills or hemoptysis  Cardiovascular: No chest pain, palpitations, s  Gastrointestinal: No abdominal or epigastric pain. No nausea, vomiting or hematemesis;   Genitourinary: No dysuria, frequency, hematuria or incontinence  Neurological: No headaches,   Psychiatric: No d, mood swings or difficulty sleeping  Musculoskeletal: No joint pain or swelling; No muscle, back or extremity pain  Skin: No itching, burning, rashes or lesions   Lymph Nodes: No enlarged glands  Endocrine: No heat or cold intolerance;  Allergy and Immunologic: No hives or eczema    On Neurological Examination:    Mental Status - Pt is alert, awake, oriented X1  . Follows commands well and able to answer questions appropriately    Speech -  Normal.    Cranial Nerves - Pupils 3 mm equal and reactive to light, extraocular eye movements intact. Pt has no visual field deficit.  Pt has no facial asymmetry. Facial sensation is intact  .Tongue - is in midline.    Muscle tone - is normal     Motor Exam - 4+/5 all over,   No drift. No shaking or tremors.    Sensory Exam . Pt withdraws all extremities equally on stimulation. No asymmetry seen. No complaints of tingling, numbness.        coordination:    Finger to nose: normal    Heel to shin: normal    Deep tendon Reflexes - 2 plus all over.        Romberg - Negative.    Neck Supple -  Yes.     MEDICATIONS    ALBUTerol    90 MICROgram(s) HFA Inhaler 1 Puff(s) Inhalation every 4 hours PRN  apixaban 10 milliGRAM(s) Oral every 12 hours  loratadine 10 milliGRAM(s) Oral daily  losartan 50 milliGRAM(s) Oral daily  melatonin 3 milliGRAM(s) Oral at bedtime  pantoprazole    Tablet 40 milliGRAM(s) Oral before breakfast  potassium phosphate / sodium phosphate Tablet (K-PHOS No. 2) 1 Tablet(s) Oral four times a day      Allergies    No Known Allergies    Intolerances              05-05    144  |  113<H>  |  17  ----------------------------<  83  3.8   |  26  |  0.60    Ca    8.4<L>      05 May 2021 09:48  Phos  2.9     05-05  Mg     2.3     05-05                Hemoglobin A1C:     Vitamin B12     RADIOLOGY    ASSESSMENT AND PLAN:      seen for ams-  metabolic encephalopathy  dementia  PE    NO FURTHER NEURO W/U  on AC  Physical therapy evaluation.  OOB to chair/ambulation with assistance only.  Advanced care planning was discussed with family.  Pain is accessed and addressed.

## 2021-05-06 LAB
ANION GAP SERPL CALC-SCNC: 6 MMOL/L — SIGNIFICANT CHANGE UP (ref 5–17)
BUN SERPL-MCNC: 18 MG/DL — SIGNIFICANT CHANGE UP (ref 7–23)
CALCIUM SERPL-MCNC: 8.5 MG/DL — SIGNIFICANT CHANGE UP (ref 8.5–10.1)
CHLORIDE SERPL-SCNC: 110 MMOL/L — HIGH (ref 96–108)
CO2 SERPL-SCNC: 27 MMOL/L — SIGNIFICANT CHANGE UP (ref 22–31)
CREAT SERPL-MCNC: 0.65 MG/DL — SIGNIFICANT CHANGE UP (ref 0.5–1.3)
GLUCOSE SERPL-MCNC: 81 MG/DL — SIGNIFICANT CHANGE UP (ref 70–99)
HCT VFR BLD CALC: 35.7 % — SIGNIFICANT CHANGE UP (ref 34.5–45)
HGB BLD-MCNC: 11.7 G/DL — SIGNIFICANT CHANGE UP (ref 11.5–15.5)
MAGNESIUM SERPL-MCNC: 2.2 MG/DL — SIGNIFICANT CHANGE UP (ref 1.6–2.6)
MCHC RBC-ENTMCNC: 31.5 PG — SIGNIFICANT CHANGE UP (ref 27–34)
MCHC RBC-ENTMCNC: 32.8 GM/DL — SIGNIFICANT CHANGE UP (ref 32–36)
MCV RBC AUTO: 96 FL — SIGNIFICANT CHANGE UP (ref 80–100)
NRBC # BLD: 0 /100 WBCS — SIGNIFICANT CHANGE UP (ref 0–0)
PHOSPHATE SERPL-MCNC: 3.1 MG/DL — SIGNIFICANT CHANGE UP (ref 2.5–4.5)
PLATELET # BLD AUTO: 179 K/UL — SIGNIFICANT CHANGE UP (ref 150–400)
POTASSIUM SERPL-MCNC: 3.9 MMOL/L — SIGNIFICANT CHANGE UP (ref 3.5–5.3)
POTASSIUM SERPL-SCNC: 3.9 MMOL/L — SIGNIFICANT CHANGE UP (ref 3.5–5.3)
RBC # BLD: 3.72 M/UL — LOW (ref 3.8–5.2)
RBC # FLD: 13.1 % — SIGNIFICANT CHANGE UP (ref 10.3–14.5)
SARS-COV-2 RNA SPEC QL NAA+PROBE: DETECTED
SODIUM SERPL-SCNC: 143 MMOL/L — SIGNIFICANT CHANGE UP (ref 135–145)
WBC # BLD: 5.23 K/UL — SIGNIFICANT CHANGE UP (ref 3.8–10.5)
WBC # FLD AUTO: 5.23 K/UL — SIGNIFICANT CHANGE UP (ref 3.8–10.5)

## 2021-05-06 RX ORDER — APIXABAN 2.5 MG/1
1 TABLET, FILM COATED ORAL
Qty: 1 | Refills: 0
Start: 2021-05-06 | End: 2021-06-04

## 2021-05-06 RX ORDER — APIXABAN 2.5 MG/1
5 TABLET, FILM COATED ORAL EVERY 12 HOURS
Refills: 0 | Status: DISCONTINUED | OUTPATIENT
Start: 2021-05-08 | End: 2021-05-07

## 2021-05-06 RX ADMIN — Medication 1 TABLET(S): at 18:27

## 2021-05-06 RX ADMIN — LOSARTAN POTASSIUM 50 MILLIGRAM(S): 100 TABLET, FILM COATED ORAL at 06:10

## 2021-05-06 RX ADMIN — Medication 1 TABLET(S): at 06:10

## 2021-05-06 RX ADMIN — PANTOPRAZOLE SODIUM 40 MILLIGRAM(S): 20 TABLET, DELAYED RELEASE ORAL at 06:10

## 2021-05-06 RX ADMIN — APIXABAN 10 MILLIGRAM(S): 2.5 TABLET, FILM COATED ORAL at 06:10

## 2021-05-06 RX ADMIN — APIXABAN 10 MILLIGRAM(S): 2.5 TABLET, FILM COATED ORAL at 18:28

## 2021-05-06 RX ADMIN — Medication 1 TABLET(S): at 13:58

## 2021-05-06 RX ADMIN — LORATADINE 10 MILLIGRAM(S): 10 TABLET ORAL at 13:58

## 2021-05-06 NOTE — PROGRESS NOTE ADULT - SUBJECTIVE AND OBJECTIVE BOX
Neurology Follow up note    ANIRUDH RICHARDSON80yFemale    HPI:  81 yo female PMH dementia, HTN, asthma controlled, seasonal allergies presents to ED c/o SOB. Onset this AM. Hx obtained from daughter Yanelis Fraire as patient has hx of dementia with significant mental decline for past 1-2 months. Daughter states on Monday AM patient went to neighbors house stating she was SOB. Neighbor called the ambulance and patient was transferred to [facility] and diagnosed with Covid,+ Rxed  with Regen, was observation  daughter does not know how patient got covid as patient is largely home bound with no visitors. Patient was d/c the same day (4/26) with albuterol inhaler and Losartan as pt was hypertensive. This AM, patient had similar episode where she went to [facility] and c/o SOB, so neighbor called ambulance and patient was transferred to [place]. Patient seen and examined at bedside, is a poor historian, A&Ox1 (person). Patient states she was SOB this AM but does not know why she is here. C/o chest tightness but denies cough, wheezing, CP, palpitations, fevers, chills, nausea, abd pain. When asked if patient was in hospital on Monday, she replied "I suppose so." Per daughter, patient with significant mental decline since March. Patient received the Pfizer  Covid vaccine  3/26   and since then has had a progressive decline in mental status 2nd dose Vaccine was given 4/16 , Prior to March patient was A&Ox3 and independent, however since then patient has had increased confusion, found wandering to [facility]. Patient does not have a PCP, has seen Dr. Clements once for pre-op clearance for cataract surgery 1 year ago. Daughter states they have been trying to make an appointment with a neurologist for her cognitive decline but have been unable to do so. Of note, patient has a history of hypotension and has never been on anti-hypertensive medications until Monday. Daughter states she was very surprised that her mother was found to be hypertensive and prescribed Losartan. Patient was given one dose yesterday.     In ED:  Vitals: T 98, HR 63, /76, RR 18, SpO2 100% on RA  Labs significant for: WBC 3.77, D-dimer 672, Cl 112, anion gap 2, AST 12, ALT 11  CTA: + acute bilateral pulmonary emboli, no evidence of R heart strain  CT Head: Small vessel ischemic changes in both hemispheres with volume loss and involutional change. No acute infarct or hemorrhagic lesion noted. Right frontal meningioma measuring approximately 1.1 cm.  EKG: NSR, rate 62, T wave abnormality   Given: 1L NS bolus x1, Albuterol inhaler x1, Lovenox 50mg x1  COVID 19-PCR -NEG       (28 Apr 2021 19:49)      Interval History - no new events    Patient is seen, chart was reviewed and case was discussed with the treatment team.  Pt is not in any distress.   Lying on bed comfortably.     Vital Signs Last 24 Hrs  T(C): 36.6 (06 May 2021 08:43), Max: 36.9 (05 May 2021 19:24)  T(F): 97.9 (06 May 2021 08:43), Max: 98.4 (05 May 2021 19:24)  HR: 64 (06 May 2021 08:43) (63 - 83)  BP: 147/66 (06 May 2021 08:43) (147/66 - 151/74)  BP(mean): --  RR: 16 (06 May 2021 08:43) (16 - 18)  SpO2: 97% (06 May 2021 08:43) (96% - 97%)          REVIEW OF SYSTEMS:    Constitutional: No fever,   Eyes: No eye pain, visual disturbances, or discharge  ENT:  No difficulty hearing, tinnitus, vertigo; No sinus or throat pain  Neck: No pain or stiffness  Respiratory: No , wheezing, chills or hemoptysis  Cardiovascular: No chest pain, palpitations, s  Gastrointestinal: No abdominal or epigastric pain. No nausea, vomiting or hematemesis;   Genitourinary: No dysuria, frequency, hematuria or incontinence  Neurological: No headaches,   Psychiatric: No d, mood swings or difficulty sleeping  Musculoskeletal: No joint pain or swelling; No muscle, back or extremity pain  Skin: No itching, burning, rashes or lesions   Lymph Nodes: No enlarged glands  Endocrine: No heat or cold intolerance;  Allergy and Immunologic: No hives or eczema    On Neurological Examination:    Mental Status - Pt is alert, awake, oriented X1  . Follows commands well and able to answer questions appropriately    Speech -  Normal.    Cranial Nerves - Pupils 3 mm equal and reactive to light, extraocular eye movements intact. Pt has no visual field deficit.  Pt has no facial asymmetry. Facial sensation is intact  .Tongue - is in midline.    Muscle tone - is normal     Motor Exam - 4+/5 all over,   No drift. No shaking or tremors.    Sensory Exam . Pt withdraws all extremities equally on stimulation. No asymmetry seen. No complaints of tingling, numbness.        coordination:    Finger to nose: normal    Heel to shin: normal    Deep tendon Reflexes - 2 plus all over.        Romberg - Negative.    Neck Supple -  Yes.     MEDICATIONS    ALBUTerol    90 MICROgram(s) HFA Inhaler 1 Puff(s) Inhalation every 4 hours PRN  apixaban 10 milliGRAM(s) Oral every 12 hours  loratadine 10 milliGRAM(s) Oral daily  losartan 50 milliGRAM(s) Oral daily  melatonin 3 milliGRAM(s) Oral at bedtime  pantoprazole    Tablet 40 milliGRAM(s) Oral before breakfast  potassium phosphate / sodium phosphate Tablet (K-PHOS No. 2) 1 Tablet(s) Oral four times a day      Allergies    No Known Allergies    Intolerances             05-06    143  |  110<H>  |  18  ----------------------------<  81  3.9   |  27  |  0.65    Ca    8.5      06 May 2021 07:09  Phos  3.1     05-06  Mg     2.2     05-06                    Hemoglobin A1C:     Vitamin B12     RADIOLOGY    ASSESSMENT AND PLAN:      seen for ams-  metabolic encephalopathy  dementia  PE    NO FURTHER NEURO W/U  on AC  Physical therapy evaluation.  OOB to chair/ambulation with assistance only.  Advanced care planning was discussed with family.  Pain is accessed and addressed.

## 2021-05-06 NOTE — PHARMACOTHERAPY INTERVENTION NOTE - COMMENTS
Patient being treated for an acute pulmonary emboli diagnosed on 4/28/21 with eliquis 10 mg BID. Reached out to MD (Dr. Thomason), to discuss entering order for eliquis 5 mg BID starting 5/8/21 for maintenance. MD agreed and order entered.
Patient currently ordered for Eliquis 2.5 mg BID for PE (diagnosed 4/28/21). Discussed with Dr. Phill Thorpe, recommended increasing to Eliquis 10 mg BID for 7 days to load patient for acute PE treatment, accepted.

## 2021-05-06 NOTE — PROGRESS NOTE ADULT - SUBJECTIVE AND OBJECTIVE BOX
Date/Time Patient Seen:  		  Referring MD:   Data Reviewed	       Patient is a 80y old  Female who presents with a chief complaint of SOB (05 May 2021 10:44)      Subjective/HPI     PAST MEDICAL & SURGICAL HISTORY:  No pertinent past medical history    Uncomplicated asthma, unspecified asthma severity  past history - currently stable with no medications    HTN (hypertension)    No significant past surgical history    S/P cataract surgery    History of partial hysterectomy          Medication list         MEDICATIONS  (STANDING):  apixaban 10 milliGRAM(s) Oral every 12 hours  loratadine 10 milliGRAM(s) Oral daily  losartan 50 milliGRAM(s) Oral daily  melatonin 3 milliGRAM(s) Oral at bedtime  pantoprazole    Tablet 40 milliGRAM(s) Oral before breakfast  potassium phosphate / sodium phosphate Tablet (K-PHOS No. 2) 1 Tablet(s) Oral four times a day    MEDICATIONS  (PRN):  ALBUTerol    90 MICROgram(s) HFA Inhaler 1 Puff(s) Inhalation every 4 hours PRN Shortness of Breath and/or Wheezing         Vitals log        ICU Vital Signs Last 24 Hrs  T(C): 36.4 (06 May 2021 06:06), Max: 36.9 (05 May 2021 19:24)  T(F): 97.5 (06 May 2021 06:06), Max: 98.4 (05 May 2021 19:24)  HR: 63 (06 May 2021 06:06) (63 - 83)  BP: 148/73 (06 May 2021 06:06) (128/73 - 151/74)  BP(mean): --  ABP: --  ABP(mean): --  RR: 18 (06 May 2021 06:06) (18 - 18)  SpO2: 97% (06 May 2021 06:06) (92% - 97%)           Input and Output:  I&O's Detail    05 May 2021 07:01  -  06 May 2021 06:15  --------------------------------------------------------  IN:    Oral Fluid: 474 mL  Total IN: 474 mL    OUT:  Total OUT: 0 mL    Total NET: 474 mL          Lab Data                        11.8   4.54  )-----------( 171      ( 05 May 2021 09:48 )             35.8     05-05    144  |  113<H>  |  17  ----------------------------<  83  3.8   |  26  |  0.60    Ca    8.4<L>      05 May 2021 09:48  Phos  2.9     05-05  Mg     2.3     05-05              Review of Systems	      Objective     Physical Examination    on room air  head nc  head at  heart s1s2  lung dec BS  abd soft      Pertinent Lab findings & Imaging      Marta:  NO   Adequate UO     I&O's Detail    05 May 2021 07:01  -  06 May 2021 06:15  --------------------------------------------------------  IN:    Oral Fluid: 474 mL  Total IN: 474 mL    OUT:  Total OUT: 0 mL    Total NET: 474 mL               Discussed with:     Cultures:	        Radiology

## 2021-05-06 NOTE — PROGRESS NOTE ADULT - ATTENDING COMMENTS
81 yo female PMH dementia, HTN, asthma controlled, seasonal allergies presents to ED c/o SOB, onset this AM. Patient discharged from [facility] on Monday 4/16 and diagnosed with Covid at that time. Patient admitted for acute bilat pulmonary emboli found on CTA.  pt seen, examined, case & care plan d/w, pt 's Dtr, residents at detail.  D/W dtr today  about D/C Home with 24 hrs care   - PT----> HOME with 24 hrs supervision, COVID PCR +   NO Need for isolation as d/w DR Yen.  PO diet  D/W Social service ----> pt stable for d/c home with supervision or HHA  Total care time is 40 minutes.

## 2021-05-06 NOTE — PROGRESS NOTE ADULT - SUBJECTIVE AND OBJECTIVE BOX
Patient is a 80y old  Female who presents with a chief complaint of SOB (30 Apr 2021 16:33)      HPI:  81 yo female PMH dementia, HTN, asthma controlled, seasonal allergies presents to ED c/o SOB. Onset this AM. Hx obtained from daughter Yanelis Fraire as patient has hx of dementia with significant mental decline for past 1-2 months. Daughter states on Monday AM patient went to [facility] stating she was SOB. Neighbor called the ambulance and patient was transferred to [facility] and diagnosed with Covid,+ Rxed  with Regen, was observation  daughter does not know how patient got covid as patient is largely home bound with no visitors. Patient was d/c the same day (4/26) with albuterol inhaler and Losartan as pt was hypertensive. This AM, patient had similar episode where she went to [facility] and c/o SOB, so neighbor called ambulance and patient was transferred to [place]. Patient seen and examined at bedside, is a poor historian, A&Ox1 (person). Patient states she was SOB this AM but does not know why she is here. C/o chest tightness but denies cough, wheezing, CP, palpitations, fevers, chills, nausea, abd pain. When asked if patient was in hospital on Monday, she replied "I suppose so." Per daughter, patient with significant mental decline since March. Patient received the Pfizer  Covid vaccine  3/26   and since then has had a progressive decline in mental status 2nd dose Vaccine was given 4/16 , Prior to March patient was A&Ox3 and independent, however since then patient has had increased confusion, found wandering to [facility]. Patient does not have a PCP, has seen Dr. Clements once for pre-op clearance for cataract surgery 1 year ago. Daughter states they have been trying to make an appointment with a neurologist for her cognitive decline but have been unable to do so. Of note, patient has a history of hypotension and has never been on anti-hypertensive medications until Monday. Daughter states she was very surprised that her mother was found to be hypertensive and prescribed Losartan. Patient was given one dose yesterday.     In ED:  Vitals: T 98, HR 63, /76, RR 18, SpO2 100% on RA  Labs significant for: WBC 3.77, D-dimer 672, Cl 112, anion gap 2, AST 12, ALT 11  CTA: + acute bilateral pulmonary emboli, no evidence of R heart strain  CT Head: Small vessel ischemic changes in both hemispheres with volume loss and involutional change. No acute infarct or hemorrhagic lesion noted. Right frontal meningioma measuring approximately 1.1 cm.  EKG: NSR, rate 62, T wave abnormality   Given: 1L NS bolus x1, Albuterol inhaler x1, Lovenox 50mg x1  COVID 19-PCR -NEG       (28 Apr 2021 19:49)      INTERVAL HPI:  4/29/21: Pt admitted overnight for acute b/l PE. Pt seen and examined. Pt A&Ox1, unsure of why she is here. Pleasant, on RA and denies SOB. Has no acute complaints this AM. On lovenox 50 mg daily for PE. CXR no acute pulm disease, US duplex no dvt b/l. F/u TTE.   4/30/21: No acute overnight events. Per RN, pt did not sleep much throughout the night. Confused, tries to get out of bed but is redirectable and pleasant. Pt seen and examined, pt outside by nursing station as she keeps trying to climb out of bed. Pt confused, A&Ox1, but pleasant. She has no acute complaints. She is tolerating room air. Pt switched to eliquis 10 mg bid. TTE with no heart strain. PT recommending BENJAMIN.   5/1/21: No acute overnight events. Patient is very confused, but redirectable and pleasant. Mildly somnolent but responds to verbal and physical stimuli. A&Ox0-1 but pleasant. Tolerating room air. On Eliquis 10 mg BID. PT recommending BENJAMIN.  5/2/21: No acute overnight events. Pt is confused but alert, very pleasant and re-directable. Tolerating room air, on eliquis 10 mg BID. PT recommending BENJAMIN, pending SW.  5/3/21: No acute overnight events. Pt confused but alert, pleasant, sitting in bed comfortably eating breakfast. Tolerating room air, on eliquis 10 mg BID for PE. PT recommending BENJAMIN. Repeat COVID-19 PCR  positive, however per ID pt day 14 of illness and does not need isolation precautions.   5/4/21: No acute overnight events. Pt pleasant, confused, sitting up in bed in no acute distress. Tolerating room air, has no acute complaints. Discharge pending negative repeat COVID-19 PCR. F/u repeat COVID-19. Per ID no need for isolation precautions.   5/5/21: No acute overnight events. Pt seen and examined. Pleasant, confused, thinks she's at home. Laying in bed in no acute distress. Tolerating room air. No acute complaints. Discharge pending for BENJAMIN. Per ID no need for isolation precautions. Ambulation  5/6/21: No acute complaints. Pt seen and examined. Pleasant, confused, thinks she's at home. Sitting in chair in NAD tolerating room air. No acute complaints. Discussed with PT after re-eval, pt can return home with 24hr assist. Discharge pending.      OVERNIGHT EVENTS: none     Home Medications:  losartan 50 mg oral tablet: 1 tab(s) orally once a day (28 Apr 2021 19:49)      MEDICATIONS  (STANDING):  apixaban 10 milliGRAM(s) Oral every 12 hours  loratadine 10 milliGRAM(s) Oral daily  losartan 50 milliGRAM(s) Oral daily  melatonin 3 milliGRAM(s) Oral at bedtime  pantoprazole    Tablet 40 milliGRAM(s) Oral before breakfast  potassium phosphate / sodium phosphate Tablet (K-PHOS No. 2) 1 Tablet(s) Oral four times a day    MEDICATIONS  (PRN):  ALBUTerol    90 MICROgram(s) HFA Inhaler 1 Puff(s) Inhalation every 4 hours PRN Shortness of Breath and/or Wheezing      Allergies    No Known Allergies    Intolerances        REVIEW OF SYSTEMS: "I feel fine"  CONSTITUTIONAL: No fever, No chills, No fatigue, No myalgia, No Body ache, No Weakness  EYES: No eye pain,  No visual disturbances, No discharge, NO Redness  ENMT:  No ear pain, No nose bleed, No vertigo; No sinus or throat pain, No Congestion  NECK: No pain, No stiffness  RESPIRATORY: No cough, wheezing, No  hemoptysis, No shortness of breath  CARDIOVASCULAR: No chest pain, palpitations  GASTROINTESTINAL: No abdominal or epigastric pain. No nausea, No vomiting; No diarrhea or constipation. [  ] BM  GENITOURINARY: No dysuria, No frequency, No urgency, No hematuria, or incontinence  NEUROLOGICAL: No headaches, No dizziness, No numbness, No tingling, No tremors, No weakness  EXT: No Swelling, No Pain, No Edema  SKIN:  [ x ] No itching, burning, rashes, or lesions   MUSCULOSKELETAL: No joint pain or swelling; No muscle pain, No back pain, No extremity pain  PSYCHIATRIC: No depression, anxiety, mood swings or difficulty sleeping at night  PAIN SCALE: [x  ] None  [  ] Other-  ROS unreliable due to - [ x ] Dementia  [  ] Lethargy  [  ] Sedated [  ] non verbal  REST OF REVIEW Of SYSTEM - [ x ] Normal     Vital Signs Last 24 Hrs  T(C): 36.6 (06 May 2021 08:43), Max: 36.9 (05 May 2021 19:24)  T(F): 97.9 (06 May 2021 08:43), Max: 98.4 (05 May 2021 19:24)  HR: 64 (06 May 2021 08:43) (63 - 83)  BP: 147/66 (06 May 2021 08:43) (147/66 - 151/74)  BP(mean): --  RR: 16 (06 May 2021 08:43) (16 - 18)  SpO2: 97% (06 May 2021 08:43) (96% - 97%)  Finger Stick        05-05 @ 07:01  -  05-06 @ 07:00  --------------------------------------------------------  IN: 474 mL / OUT: 0 mL / NET: 474 mL    05-06 @ 07:01  -  05-06 @ 15:48  --------------------------------------------------------  IN: 340 mL / OUT: 0 mL / NET: 340 mL        PHYSICAL EXAM:  GENERAL:  [ x ] NAD , [ x ] well appearing, [  ] Agitated, [  ] Drowsy,  [  ] Lethargy, [ x ] confused   HEAD:  [ x ] Normal, [  ] Other  EYES:  [x  ] EOMI, [ x ] PERRLA, [x  ] conjunctiva and sclera clear normal, [  ] Other,  [  ] Pallor,[  ] Discharge  ENMT:  [ x ] Normal, [x  ] Moist mucous membranes, [  ] Good dentition, [ x ] No Thrush  NECK:  [ x ] Supple, [ x ] No JVD, [ x ] Normal thyroid, [  ] Lymphadenopathy [  ] Other  CHEST/LUNG:  [ x ] Clear to auscultation bilaterally,  [x  ] No rales, [ x ] No rhonchi  [ x ]  No wheezing  HEART:  [ x ] Regular rate and rhythm, [  ] tachycardia, [  ] Bradycardia,  [  ] irregular  [ x ] No murmurs, No rubs, No gallops, [  ] PPM in place (Mfr:  )  ABDOMEN:  [x  ] Soft, [ x ] Nontender, [ x ] Nondistended, [ x ] No mass, [ x ] Bowel sounds present, [  ] obese  NERVOUS SYSTEM:  [ x ] Alert & Oriented X1, [ x ] Nonfocal  [ x ] Confusion  [  ] Encephalopathic [  ] Sedated [  ] Unable to assess, [ x ] Other-Dementia  EXTREMITIES: [ x ] 2+ Peripheral Pulses, No clubbing, No cyanosis,  [  ] edema B/L lower EXT. [  ] PVD stasis skin changes B/L Lower EXT  LYMPH: No lymphadenopathy noted  SKIN:  [ x ] No rashes or lesions, [  ] Pressure Ulcers, [  ] ecchymosis, [  ] Skin Tears, [  ] Other    DIET: Diet, Soft (04-28-21 @ 20:31) [Active]    LABS:                        11.7   5.23  )-----------( 179      ( 06 May 2021 07:09 )             35.7     06 May 2021 07:09    143    |  110    |  18     ----------------------------<  81     3.9     |  27     |  0.65     Ca    8.5        06 May 2021 07:09  Phos  3.1       06 May 2021 07:09  Mg     2.2       06 May 2021 07:09                               Anemia Panel:      Thyroid Panel:                RADIOLOGY & ADDITIONAL TESTS: none in past 24 hr       HEALTH ISSUES - PROBLEM Dx:  Pulmonary embolism, bilateral    Dementia    HTN (hypertension)    Asthma    Seasonal allergies    COVID-19 virus detected    Need for prophylactic measure            Consultant(s) Notes Reviewed:  [ x ] YES     Care Discussed with [ x ] Consultants, [ x ] Patient, [ x ] Family, [  ] HCP, [x  ] , [ x ] Social Service, [ x ] RN, [ x ] Physical Therapy, [  ] Palliative Care Team  DVT PPX: [  ] Lovenox, [  ] SC Heparin, [  ] Coumadin, [  ] Xarelto, [x  ] Eliquis, [  ] Pradaxa, [  ] IV Heparin drip, [  ] SCD, [  ] Ambulation, [  ] Contraindicated 2/2 GI Bleed, [  ] Contraindicated 2/2  Bleed, [  ] Contraindicated 2/2 Brain Bleed  Advanced Directive: [  ] None, [x  ] DNR/DNI Patient is a 80y old  Female who presents with a chief complaint of SOB (30 Apr 2021 16:33)      HPI:  81 yo female PMH dementia, HTN, asthma controlled, seasonal allergies presents to ED c/o SOB. Onset this AM. Hx obtained from daughter Yanelis Fraire as patient has hx of dementia with significant mental decline for past 1-2 months. Daughter states on Monday AM patient went to [facility] stating she was SOB. Neighbor called the ambulance and patient was transferred to [facility] and diagnosed with Covid,+ Rxed  with Regen, was observation  daughter does not know how patient got covid as patient is largely home bound with no visitors. Patient was d/c the same day (4/26) with albuterol inhaler and Losartan as pt was hypertensive. This AM, patient had similar episode where she went to [facility] and c/o SOB, so neighbor called ambulance and patient was transferred to [place]. Patient seen and examined at bedside, is a poor historian, A&Ox1 (person). Patient states she was SOB this AM but does not know why she is here. C/o chest tightness but denies cough, wheezing, CP, palpitations, fevers, chills, nausea, abd pain. When asked if patient was in hospital on Monday, she replied "I suppose so." Per daughter, patient with significant mental decline since March. Patient received the Pfizer  Covid vaccine  3/26   and since then has had a progressive decline in mental status 2nd dose Vaccine was given 4/16 , Prior to March patient was A&Ox3 and independent, however since then patient has had increased confusion, found wandering to [facility]. Patient does not have a PCP, has seen Dr. Clements once for pre-op clearance for cataract surgery 1 year ago. Daughter states they have been trying to make an appointment with a neurologist for her cognitive decline but have been unable to do so. Of note, patient has a history of hypotension and has never been on anti-hypertensive medications until Monday. Daughter states she was very surprised that her mother was found to be hypertensive and prescribed Losartan. Patient was given one dose yesterday.     In ED:  Vitals: T 98, HR 63, /76, RR 18, SpO2 100% on RA  Labs significant for: WBC 3.77, D-dimer 672, Cl 112, anion gap 2, AST 12, ALT 11  CTA: + acute bilateral pulmonary emboli, no evidence of R heart strain  CT Head: Small vessel ischemic changes in both hemispheres with volume loss and involutional change. No acute infarct or hemorrhagic lesion noted. Right frontal meningioma measuring approximately 1.1 cm.  EKG: NSR, rate 62, T wave abnormality   Given: 1L NS bolus x1, Albuterol inhaler x1, Lovenox 50mg x1  COVID 19-PCR -NEG       (28 Apr 2021 19:49)      INTERVAL HPI:  4/29/21: Pt admitted overnight for acute b/l PE. Pt seen and examined. Pt A&Ox1, unsure of why she is here. Pleasant, on RA and denies SOB. Has no acute complaints this AM. On lovenox 50 mg daily for PE. CXR no acute pulm disease, US duplex no dvt b/l. F/u TTE.   4/30/21: No acute overnight events. Per RN, pt did not sleep much throughout the night. Confused, tries to get out of bed but is redirectable and pleasant. Pt seen and examined, pt outside by nursing station as she keeps trying to climb out of bed. Pt confused, A&Ox1, but pleasant. She has no acute complaints. She is tolerating room air. Pt switched to eliquis 10 mg bid. TTE with no heart strain. PT recommending BENJAMIN.   5/1/21: No acute overnight events. Patient is very confused, but redirectable and pleasant. Mildly somnolent but responds to verbal and physical stimuli. A&Ox0-1 but pleasant. Tolerating room air. On Eliquis 10 mg BID. PT recommending BENJAMIN.  5/2/21: No acute overnight events. Pt is confused but alert, very pleasant and re-directable. Tolerating room air, on eliquis 10 mg BID. PT recommending BENJAMIN, pending SW.  5/3/21: No acute overnight events. Pt confused but alert, pleasant, sitting in bed comfortably eating breakfast. Tolerating room air, on eliquis 10 mg BID for PE. PT recommending BENJAMIN. Repeat COVID-19 PCR  positive, however per ID pt day 14 of illness and does not need isolation precautions.   5/4/21: No acute overnight events. Pt pleasant, confused, sitting up in bed in no acute distress. Tolerating room air, has no acute complaints. Discharge pending negative repeat COVID-19 PCR. F/u repeat COVID-19. Per ID no need for isolation precautions.   5/5/21: No acute overnight events. Pt seen and examined. Pleasant, confused, thinks she's at home. Laying in bed in no acute distress. Tolerating room air. No acute complaints. Discharge pending for BENJAMIN. Per ID no need for isolation precautions. Ambulation  5/6/21: No acute complaints. Pt seen and examined. Pleasant, confused, thinks she's at home. Sitting in chair in NAD tolerating room air. No acute complaints. Discussed with PT after re-eval, pt can return home with 24hr assist. Discharge pending. D/W Dtr [phone] about D/C Home .      OVERNIGHT EVENTS: none     Home Medications:  losartan 50 mg oral tablet: 1 tab(s) orally once a day (28 Apr 2021 19:49)      MEDICATIONS  (STANDING):  apixaban 10 milliGRAM(s) Oral every 12 hours  loratadine 10 milliGRAM(s) Oral daily  losartan 50 milliGRAM(s) Oral daily  melatonin 3 milliGRAM(s) Oral at bedtime  pantoprazole    Tablet 40 milliGRAM(s) Oral before breakfast  potassium phosphate / sodium phosphate Tablet (K-PHOS No. 2) 1 Tablet(s) Oral four times a day    MEDICATIONS  (PRN):  ALBUTerol    90 MICROgram(s) HFA Inhaler 1 Puff(s) Inhalation every 4 hours PRN Shortness of Breath and/or Wheezing      Allergies    No Known Allergies    Intolerances        REVIEW OF SYSTEMS: "I feel fine"  CONSTITUTIONAL: No fever, No chills, No fatigue, No myalgia, No Body ache, No Weakness  EYES: No eye pain,  No visual disturbances, No discharge, NO Redness  ENMT:  No ear pain, No nose bleed, No vertigo; No sinus or throat pain, No Congestion  NECK: No pain, No stiffness  RESPIRATORY: No cough, wheezing, No  hemoptysis, No shortness of breath  CARDIOVASCULAR: No chest pain, palpitations  GASTROINTESTINAL: No abdominal or epigastric pain. No nausea, No vomiting; No diarrhea or constipation. [  ] BM  GENITOURINARY: No dysuria, No frequency, No urgency, No hematuria, or incontinence  NEUROLOGICAL: No headaches, No dizziness, No numbness, No tingling, No tremors, No weakness  EXT: No Swelling, No Pain, No Edema  SKIN:  [ x ] No itching, burning, rashes, or lesions   MUSCULOSKELETAL: No joint pain or swelling; No muscle pain, No back pain, No extremity pain  PSYCHIATRIC: No depression, anxiety, mood swings or difficulty sleeping at night  PAIN SCALE: [x  ] None  [  ] Other-  ROS unreliable due to - [ x ] Dementia  [  ] Lethargy  [  ] Sedated [  ] non verbal  REST OF REVIEW Of SYSTEM - [ x ] Normal     Vital Signs Last 24 Hrs  T(C): 36.6 (06 May 2021 08:43), Max: 36.9 (05 May 2021 19:24)  T(F): 97.9 (06 May 2021 08:43), Max: 98.4 (05 May 2021 19:24)  HR: 64 (06 May 2021 08:43) (63 - 83)  BP: 147/66 (06 May 2021 08:43) (147/66 - 151/74)  BP(mean): --  RR: 16 (06 May 2021 08:43) (16 - 18)  SpO2: 97% (06 May 2021 08:43) (96% - 97%)  Finger Stick        05-05 @ 07:01  -  05-06 @ 07:00  --------------------------------------------------------  IN: 474 mL / OUT: 0 mL / NET: 474 mL    05-06 @ 07:01  -  05-06 @ 15:48  --------------------------------------------------------  IN: 340 mL / OUT: 0 mL / NET: 340 mL        PHYSICAL EXAM:  GENERAL:  [ x ] NAD , [ x ] well appearing, [  ] Agitated, [  ] Drowsy,  [  ] Lethargy, [ x ] confused   HEAD:  [ x ] Normal, [  ] Other  EYES:  [x  ] EOMI, [ x ] PERRLA, [x  ] conjunctiva and sclera clear normal, [  ] Other,  [  ] Pallor,[  ] Discharge  ENMT:  [ x ] Normal, [x  ] Moist mucous membranes, [  ] Good dentition, [ x ] No Thrush  NECK:  [ x ] Supple, [ x ] No JVD, [ x ] Normal thyroid, [  ] Lymphadenopathy [  ] Other  CHEST/LUNG:  [ x ] Clear to auscultation bilaterally,  [x  ] No rales, [ x ] No rhonchi  [ x ]  No wheezing  HEART:  [ x ] Regular rate and rhythm, [  ] tachycardia, [  ] Bradycardia,  [  ] irregular  [ x ] No murmurs, No rubs, No gallops, [  ] PPM in place (Mfr:  )  ABDOMEN:  [x  ] Soft, [ x ] Nontender, [ x ] Nondistended, [ x ] No mass, [ x ] Bowel sounds present, [  ] obese  NERVOUS SYSTEM:  [ x ] Alert & Oriented X1, [ x ] Nonfocal  [ x ] Confusion  [  ] Encephalopathic [  ] Sedated [  ] Unable to assess, [ x ] Other-Dementia  EXTREMITIES: [ x ] 2+ Peripheral Pulses, No clubbing, No cyanosis,  [  ] edema B/L lower EXT. [  ] PVD stasis skin changes B/L Lower EXT  LYMPH: No lymphadenopathy noted  SKIN:  [ x ] No rashes or lesions, [  ] Pressure Ulcers, [  ] ecchymosis, [  ] Skin Tears, [  ] Other    DIET: Diet, Soft (04-28-21 @ 20:31) [Active]    LABS:                        11.7   5.23  )-----------( 179      ( 06 May 2021 07:09 )             35.7     06 May 2021 07:09    143    |  110    |  18     ----------------------------<  81     3.9     |  27     |  0.65     Ca    8.5        06 May 2021 07:09  Phos  3.1       06 May 2021 07:09  Mg     2.2       06 May 2021 07:09        RADIOLOGY & ADDITIONAL TESTS: none in past 24 hr       HEALTH ISSUES - PROBLEM Dx:  Pulmonary embolism, bilateral    Dementia    HTN (hypertension)    Asthma    Seasonal allergies    COVID-19 virus detected    Need for prophylactic measure      Consultant(s) Notes Reviewed:  [ x ] YES     Care Discussed with [ x ] Consultants, [ x ] Patient, [ x ] Family, [  ] HCP, [x  ] , [ x ] Social Service, [ x ] RN, [ x ] Physical Therapy, [  ] Palliative Care Team  DVT PPX: [  ] Lovenox, [  ] SC Heparin, [  ] Coumadin, [  ] Xarelto, [x  ] Eliquis, [  ] Pradaxa, [  ] IV Heparin drip, [  ] SCD, [  ] Ambulation, [  ] Contraindicated 2/2 GI Bleed, [  ] Contraindicated 2/2  Bleed, [  ] Contraindicated 2/2 Brain Bleed  Advanced Directive: [  ] None, [x  ] DNR/DNI

## 2021-05-07 VITALS
DIASTOLIC BLOOD PRESSURE: 60 MMHG | OXYGEN SATURATION: 98 % | SYSTOLIC BLOOD PRESSURE: 149 MMHG | HEART RATE: 72 BPM | RESPIRATION RATE: 18 BRPM | TEMPERATURE: 98 F

## 2021-05-07 LAB
ANION GAP SERPL CALC-SCNC: 6 MMOL/L — SIGNIFICANT CHANGE UP (ref 5–17)
BUN SERPL-MCNC: 16 MG/DL — SIGNIFICANT CHANGE UP (ref 7–23)
CALCIUM SERPL-MCNC: 8.4 MG/DL — LOW (ref 8.5–10.1)
CHLORIDE SERPL-SCNC: 113 MMOL/L — HIGH (ref 96–108)
CO2 SERPL-SCNC: 25 MMOL/L — SIGNIFICANT CHANGE UP (ref 22–31)
CREAT SERPL-MCNC: 0.66 MG/DL — SIGNIFICANT CHANGE UP (ref 0.5–1.3)
GLUCOSE SERPL-MCNC: 82 MG/DL — SIGNIFICANT CHANGE UP (ref 70–99)
HCT VFR BLD CALC: 34.7 % — SIGNIFICANT CHANGE UP (ref 34.5–45)
HGB BLD-MCNC: 11.4 G/DL — LOW (ref 11.5–15.5)
MAGNESIUM SERPL-MCNC: 2.3 MG/DL — SIGNIFICANT CHANGE UP (ref 1.6–2.6)
MCHC RBC-ENTMCNC: 31.5 PG — SIGNIFICANT CHANGE UP (ref 27–34)
MCHC RBC-ENTMCNC: 32.9 GM/DL — SIGNIFICANT CHANGE UP (ref 32–36)
MCV RBC AUTO: 95.9 FL — SIGNIFICANT CHANGE UP (ref 80–100)
NRBC # BLD: 0 /100 WBCS — SIGNIFICANT CHANGE UP (ref 0–0)
PHOSPHATE SERPL-MCNC: 3.2 MG/DL — SIGNIFICANT CHANGE UP (ref 2.5–4.5)
PLATELET # BLD AUTO: 171 K/UL — SIGNIFICANT CHANGE UP (ref 150–400)
POTASSIUM SERPL-MCNC: 3.8 MMOL/L — SIGNIFICANT CHANGE UP (ref 3.5–5.3)
POTASSIUM SERPL-SCNC: 3.8 MMOL/L — SIGNIFICANT CHANGE UP (ref 3.5–5.3)
RBC # BLD: 3.62 M/UL — LOW (ref 3.8–5.2)
RBC # FLD: 13.2 % — SIGNIFICANT CHANGE UP (ref 10.3–14.5)
SODIUM SERPL-SCNC: 144 MMOL/L — SIGNIFICANT CHANGE UP (ref 135–145)
WBC # BLD: 3.96 K/UL — SIGNIFICANT CHANGE UP (ref 3.8–10.5)
WBC # FLD AUTO: 3.96 K/UL — SIGNIFICANT CHANGE UP (ref 3.8–10.5)

## 2021-05-07 PROCEDURE — 82607 VITAMIN B-12: CPT

## 2021-05-07 PROCEDURE — 97110 THERAPEUTIC EXERCISES: CPT

## 2021-05-07 PROCEDURE — 70450 CT HEAD/BRAIN W/O DYE: CPT

## 2021-05-07 PROCEDURE — 86769 SARS-COV-2 COVID-19 ANTIBODY: CPT

## 2021-05-07 PROCEDURE — 71045 X-RAY EXAM CHEST 1 VIEW: CPT

## 2021-05-07 PROCEDURE — 84484 ASSAY OF TROPONIN QUANT: CPT

## 2021-05-07 PROCEDURE — 97530 THERAPEUTIC ACTIVITIES: CPT

## 2021-05-07 PROCEDURE — 83880 ASSAY OF NATRIURETIC PEPTIDE: CPT

## 2021-05-07 PROCEDURE — 97116 GAIT TRAINING THERAPY: CPT

## 2021-05-07 PROCEDURE — 80048 BASIC METABOLIC PNL TOTAL CA: CPT

## 2021-05-07 PROCEDURE — 83735 ASSAY OF MAGNESIUM: CPT

## 2021-05-07 PROCEDURE — 94640 AIRWAY INHALATION TREATMENT: CPT

## 2021-05-07 PROCEDURE — 97162 PT EVAL MOD COMPLEX 30 MIN: CPT

## 2021-05-07 PROCEDURE — 71275 CT ANGIOGRAPHY CHEST: CPT

## 2021-05-07 PROCEDURE — 99285 EMERGENCY DEPT VISIT HI MDM: CPT

## 2021-05-07 PROCEDURE — 85027 COMPLETE CBC AUTOMATED: CPT

## 2021-05-07 PROCEDURE — 82746 ASSAY OF FOLIC ACID SERUM: CPT

## 2021-05-07 PROCEDURE — 0225U NFCT DS DNA&RNA 21 SARSCOV2: CPT

## 2021-05-07 PROCEDURE — 85379 FIBRIN DEGRADATION QUANT: CPT

## 2021-05-07 PROCEDURE — 85610 PROTHROMBIN TIME: CPT

## 2021-05-07 PROCEDURE — 80053 COMPREHEN METABOLIC PANEL: CPT

## 2021-05-07 PROCEDURE — 93970 EXTREMITY STUDY: CPT

## 2021-05-07 PROCEDURE — 96361 HYDRATE IV INFUSION ADD-ON: CPT

## 2021-05-07 PROCEDURE — U0003: CPT

## 2021-05-07 PROCEDURE — 84436 ASSAY OF TOTAL THYROXINE: CPT

## 2021-05-07 PROCEDURE — 36415 COLL VENOUS BLD VENIPUNCTURE: CPT

## 2021-05-07 PROCEDURE — 85025 COMPLETE CBC W/AUTO DIFF WBC: CPT

## 2021-05-07 PROCEDURE — 84480 ASSAY TRIIODOTHYRONINE (T3): CPT

## 2021-05-07 PROCEDURE — 93306 TTE W/DOPPLER COMPLETE: CPT

## 2021-05-07 PROCEDURE — 84100 ASSAY OF PHOSPHORUS: CPT

## 2021-05-07 PROCEDURE — 96360 HYDRATION IV INFUSION INIT: CPT

## 2021-05-07 PROCEDURE — 85730 THROMBOPLASTIN TIME PARTIAL: CPT

## 2021-05-07 PROCEDURE — 84443 ASSAY THYROID STIM HORMONE: CPT

## 2021-05-07 PROCEDURE — 93005 ELECTROCARDIOGRAM TRACING: CPT

## 2021-05-07 PROCEDURE — U0005: CPT

## 2021-05-07 RX ADMIN — Medication 1 TABLET(S): at 17:49

## 2021-05-07 RX ADMIN — Medication 1 TABLET(S): at 12:49

## 2021-05-07 RX ADMIN — LOSARTAN POTASSIUM 50 MILLIGRAM(S): 100 TABLET, FILM COATED ORAL at 05:53

## 2021-05-07 RX ADMIN — LORATADINE 10 MILLIGRAM(S): 10 TABLET ORAL at 12:49

## 2021-05-07 RX ADMIN — PANTOPRAZOLE SODIUM 40 MILLIGRAM(S): 20 TABLET, DELAYED RELEASE ORAL at 05:53

## 2021-05-07 RX ADMIN — Medication 1 TABLET(S): at 05:54

## 2021-05-07 RX ADMIN — APIXABAN 10 MILLIGRAM(S): 2.5 TABLET, FILM COATED ORAL at 05:53

## 2021-05-07 NOTE — PROGRESS NOTE ADULT - PROBLEM SELECTOR PROBLEM 1
Pulmonary embolism, bilateral

## 2021-05-07 NOTE — PROGRESS NOTE ADULT - PROBLEM SELECTOR PROBLEM 6
COVID-19 virus detected
Need for prophylactic measure
COVID-19 virus detected
Need for prophylactic measure
COVID-19 virus detected

## 2021-05-07 NOTE — PROGRESS NOTE ADULT - ATTENDING SUPERVISION STATEMENT
ACP/Resident
ACP/Resident
ACP
ACP/Resident

## 2021-05-07 NOTE — PROGRESS NOTE ADULT - PROBLEM SELECTOR PLAN 3
-Patient recently prescribed Losartan 50mg upon d/c at [facility] on 4/26  -Per daughter, patient with history of hypotension  -Continue losartan with hold parameters, monitor routine hemodynamics.
-Patient recently prescribed Losartan 50mg upon d/c at [facility] on 4/26  -Per daughter, patient with history of hypotension  -Continue losartan with hold parameters, monitor routine hemodynamics.
-Patient recently prescribed Losartan 50mg upon d/c at [facility] on 4/26  -Per daughter, patient with history of hypotension  -Continue losartan with hold parameters, monitor routine hemodynamics.
-Patient recently prescribed Losartan 50mg upon d/c at [facility] on 4/26  -Per daughter, patient with history of hypotension  -Continue losartan with hold parameters, monitor routine hemodynamics.

## 2021-05-07 NOTE — PROGRESS NOTE ADULT - SUBJECTIVE AND OBJECTIVE BOX
Patient is a 80y old  Female who presents with a chief complaint of SOB (30 Apr 2021 16:33)      HPI:  81 yo female PMH dementia, HTN, asthma controlled, seasonal allergies presents to ED c/o SOB. Onset this AM. Hx obtained from daughter Yanelis Fraire as patient has hx of dementia with significant mental decline for past 1-2 months. Daughter states on Monday AM patient went to [facility] stating she was SOB. Neighbor called the ambulance and patient was transferred to [facility] and diagnosed with Covid,+ Rxed  with Regen, was observation  daughter does not know how patient got covid as patient is largely home bound with no visitors. Patient was d/c the same day (4/26) with albuterol inhaler and Losartan as pt was hypertensive. This AM, patient had similar episode where she went to [facility] and c/o SOB, so neighbor called ambulance and patient was transferred to [place]. Patient seen and examined at bedside, is a poor historian, A&Ox1 (person). Patient states she was SOB this AM but does not know why she is here. C/o chest tightness but denies cough, wheezing, CP, palpitations, fevers, chills, nausea, abd pain. When asked if patient was in hospital on Monday, she replied "I suppose so." Per daughter, patient with significant mental decline since March. Patient received the Pfizer  Covid vaccine  3/26   and since then has had a progressive decline in mental status 2nd dose Vaccine was given 4/16 , Prior to March patient was A&Ox3 and independent, however since then patient has had increased confusion, found wandering to [facility]. Patient does not have a PCP, has seen Dr. Clements once for pre-op clearance for cataract surgery 1 year ago. Daughter states they have been trying to make an appointment with a neurologist for her cognitive decline but have been unable to do so. Of note, patient has a history of hypotension and has never been on anti-hypertensive medications until Monday. Daughter states she was very surprised that her mother was found to be hypertensive and prescribed Losartan. Patient was given one dose yesterday.     In ED:  Vitals: T 98, HR 63, /76, RR 18, SpO2 100% on RA  Labs significant for: WBC 3.77, D-dimer 672, Cl 112, anion gap 2, AST 12, ALT 11  CTA: + acute bilateral pulmonary emboli, no evidence of R heart strain  CT Head: Small vessel ischemic changes in both hemispheres with volume loss and involutional change. No acute infarct or hemorrhagic lesion noted. Right frontal meningioma measuring approximately 1.1 cm.  EKG: NSR, rate 62, T wave abnormality   Given: 1L NS bolus x1, Albuterol inhaler x1, Lovenox 50mg x1  COVID 19-PCR -NEG       (28 Apr 2021 19:49)      INTERVAL HPI:  4/29/21: Pt admitted overnight for acute b/l PE. Pt seen and examined. Pt A&Ox1, unsure of why she is here. Pleasant, on RA and denies SOB. Has no acute complaints this AM. On lovenox 50 mg daily for PE. CXR no acute pulm disease, US duplex no dvt b/l. F/u TTE.   4/30/21: No acute overnight events. Per RN, pt did not sleep much throughout the night. Confused, tries to get out of bed but is redirectable and pleasant. Pt seen and examined, pt outside by nursing station as she keeps trying to climb out of bed. Pt confused, A&Ox1, but pleasant. She has no acute complaints. She is tolerating room air. Pt switched to eliquis 10 mg bid. TTE with no heart strain. PT recommending BENJAMIN.   5/1/21: No acute overnight events. Patient is very confused, but redirectable and pleasant. Mildly somnolent but responds to verbal and physical stimuli. A&Ox0-1 but pleasant. Tolerating room air. On Eliquis 10 mg BID. PT recommending BENJAMIN.  5/2/21: No acute overnight events. Pt is confused but alert, very pleasant and re-directable. Tolerating room air, on eliquis 10 mg BID. PT recommending BENJAMIN, pending SW.  5/3/21: No acute overnight events. Pt confused but alert, pleasant, sitting in bed comfortably eating breakfast. Tolerating room air, on eliquis 10 mg BID for PE. PT recommending BENJAMIN. Repeat COVID-19 PCR  positive, however per ID pt day 14 of illness and does not need isolation precautions.   5/4/21: No acute overnight events. Pt pleasant, confused, sitting up in bed in no acute distress. Tolerating room air, has no acute complaints. Discharge pending negative repeat COVID-19 PCR. F/u repeat COVID-19. Per ID no need for isolation precautions.   5/5/21: No acute overnight events. Pt seen and examined. Pleasant, confused, thinks she's at home. Laying in bed in no acute distress. Tolerating room air. No acute complaints. Discharge pending for BENJAMIN. Per ID no need for isolation precautions. Ambulation  5/6/21: No acute complaints. Pt seen and examined. Pleasant, confused, thinks she's at home. Sitting in chair in NAD tolerating room air. No acute complaints. Discussed with PT after re-eval, pt can return home with 24hr assist. Discharge pending. D/W Dtr [phone] about D/C Home .  5/7/21: No acute overnight events. Pt seen and examined. Pt laying in bed comfortably asleep. Tolerating room air. No acute complaints. Discharge pending, pt 24 hr assist at home.      OVERNIGHT EVENTS: none     Home Medications:  losartan 50 mg oral tablet: 1 tab(s) orally once a day (28 Apr 2021 19:49)      MEDICATIONS  (STANDING):  apixaban 10 milliGRAM(s) Oral every 12 hours  loratadine 10 milliGRAM(s) Oral daily  losartan 50 milliGRAM(s) Oral daily  melatonin 3 milliGRAM(s) Oral at bedtime  pantoprazole    Tablet 40 milliGRAM(s) Oral before breakfast  potassium phosphate / sodium phosphate Tablet (K-PHOS No. 2) 1 Tablet(s) Oral four times a day    MEDICATIONS  (PRN):  ALBUTerol    90 MICROgram(s) HFA Inhaler 1 Puff(s) Inhalation every 4 hours PRN Shortness of Breath and/or Wheezing      Allergies    No Known Allergies    Intolerances        REVIEW OF SYSTEMS: "I feel good, just tired"  CONSTITUTIONAL: +Tired; No fever, No chills, No myalgia, No Body ache, No Weakness  EYES: No eye pain,  No visual disturbances, No discharge, NO Redness  ENMT:  No ear pain, No nose bleed, No vertigo; No sinus or throat pain, No Congestion  NECK: No pain, No stiffness  RESPIRATORY: No cough, wheezing, No  hemoptysis, No shortness of breath  CARDIOVASCULAR: No chest pain, palpitations  GASTROINTESTINAL: No abdominal or epigastric pain. No nausea, No vomiting; No diarrhea or constipation. [  ] BM  GENITOURINARY: No dysuria, No frequency, No urgency, No hematuria, or incontinence  NEUROLOGICAL: No headaches, No dizziness, No numbness, No tingling, No tremors, No weakness  EXT: No Swelling, No Pain, No Edema  SKIN:  [ x ] No itching, burning, rashes, or lesions   MUSCULOSKELETAL: No joint pain or swelling; No muscle pain, No back pain, No extremity pain  PSYCHIATRIC: No depression, anxiety, mood swings or difficulty sleeping at night  PAIN SCALE: [x  ] None  [  ] Other-  ROS unreliable due to - [ x ] Dementia  [  ] Lethargy  [  ] Sedated [  ] non verbal  REST OF REVIEW Of SYSTEM - [ x ] Normal     Vital Signs Last 24 Hrs  T(C): 36.9 (07 May 2021 11:25), Max: 36.9 (07 May 2021 11:25)  T(F): 98.5 (07 May 2021 11:25), Max: 98.5 (07 May 2021 11:25)  HR: 75 (07 May 2021 11:25) (67 - 78)  BP: 108/53 (07 May 2021 11:25) (108/53 - 112/67)  BP(mean): --  RR: 18 (07 May 2021 11:25) (18 - 18)  SpO2: 94% (07 May 2021 11:25) (94% - 98%)  Finger Stick        05-06 @ 07:01  -  05-07 @ 07:00  --------------------------------------------------------  IN: 340 mL / OUT: 0 mL / NET: 340 mL        PHYSICAL EXAM:  GENERAL:  [ x ] NAD , [ x ] well appearing, [  ] Agitated, [  ] Drowsy,  [  ] Lethargy, [ x ] confused   HEAD:  [ x ] Normal, [  ] Other  EYES:  [x  ] EOMI, [ x ] PERRLA, [x  ] conjunctiva and sclera clear normal, [  ] Other,  [  ] Pallor,[  ] Discharge  ENMT:  [ x ] Normal, [x  ] Moist mucous membranes, [  ] Good dentition, [ x ] No Thrush  NECK:  [ x ] Supple, [ x ] No JVD, [ x ] Normal thyroid, [  ] Lymphadenopathy [  ] Other  CHEST/LUNG:  [ x ] Clear to auscultation bilaterally,  [x  ] No rales, [ x ] No rhonchi  [ x ]  No wheezing  HEART:  [ x ] Regular rate and rhythm, [  ] tachycardia, [  ] Bradycardia,  [  ] irregular  [ x ] No murmurs, No rubs, No gallops, [  ] PPM in place (Mfr:  )  ABDOMEN:  [x  ] Soft, [ x ] Nontender, [ x ] Nondistended, [ x ] No mass, [ x ] Bowel sounds present, [  ] obese  NERVOUS SYSTEM:  [ x ] Alert & Oriented X1, [ x ] Nonfocal  [ x ] Confusion  [  ] Encephalopathic [  ] Sedated [  ] Unable to assess, [ x ] Other-Dementia  EXTREMITIES: [ x ] 2+ Peripheral Pulses, No clubbing, No cyanosis,  [  ] edema B/L lower EXT. [  ] PVD stasis skin changes B/L Lower EXT  LYMPH: No lymphadenopathy noted  SKIN:  [ x ] No rashes or lesions, [  ] Pressure Ulcers, [  ] ecchymosis, [  ] Skin Tears, [  ] Other    DIET: Diet, Soft (04-28-21 @ 20:31) [Active]    LABS:                        11.4   3.96  )-----------( 171      ( 07 May 2021 07:09 )             34.7     07 May 2021 07:09    144    |  113    |  16     ----------------------------<  82     3.8     |  25     |  0.66     Ca    8.4        07 May 2021 07:09  Phos  3.2       07 May 2021 07:09  Mg     2.3       07 May 2021 07:09                               Anemia Panel:      Thyroid Panel:                RADIOLOGY & ADDITIONAL TESTS: none in past 24 hr       HEALTH ISSUES - PROBLEM Dx:  Pulmonary embolism, bilateral    Dementia    HTN (hypertension)    Asthma    Seasonal allergies    COVID-19 virus detected    Need for prophylactic measure            Consultant(s) Notes Reviewed:  [ x ] YES     Care Discussed with [ x ] Consultants, [ x ] Patient, [ x ] Family, [  ] HCP, [x  ] , [ x ] Social Service, [ x ] RN, [ x ] Physical Therapy, [  ] Palliative Care Team  DVT PPX: [  ] Lovenox, [  ] SC Heparin, [  ] Coumadin, [  ] Xarelto, [x  ] Eliquis, [  ] Pradaxa, [  ] IV Heparin drip, [  ] SCD, [  ] Ambulation, [  ] Contraindicated 2/2 GI Bleed, [  ] Contraindicated 2/2  Bleed, [  ] Contraindicated 2/2 Brain Bleed  Advanced Directive: [  ] None, [x  ] DNR/DNI Patient is a 80y old  Female who presents with a chief complaint of SOB (30 Apr 2021 16:33)      HPI:  79 yo female PMH dementia, HTN, asthma controlled, seasonal allergies presents to ED c/o SOB. Onset this AM. Hx obtained from daughter Yanelis Fraire as patient has hx of dementia with significant mental decline for past 1-2 months. Daughter states on Monday AM patient went to [facility] stating she was SOB. Neighbor called the ambulance and patient was transferred to [facility] and diagnosed with Covid,+ Rxed  with Regen, was observation  daughter does not know how patient got covid as patient is largely home bound with no visitors. Patient was d/c the same day (4/26) with albuterol inhaler and Losartan as pt was hypertensive. This AM, patient had similar episode where she went to [facility] and c/o SOB, so neighbor called ambulance and patient was transferred to [place]. Patient seen and examined at bedside, is a poor historian, A&Ox1 (person). Patient states she was SOB this AM but does not know why she is here. C/o chest tightness but denies cough, wheezing, CP, palpitations, fevers, chills, nausea, abd pain. When asked if patient was in hospital on Monday, she replied "I suppose so." Per daughter, patient with significant mental decline since March. Patient received the Pfizer  Covid vaccine  3/26   and since then has had a progressive decline in mental status 2nd dose Vaccine was given 4/16 , Prior to March patient was A&Ox3 and independent, however since then patient has had increased confusion, found wandering to [facility]. Patient does not have a PCP, has seen Dr. Clements once for pre-op clearance for cataract surgery 1 year ago. Daughter states they have been trying to make an appointment with a neurologist for her cognitive decline but have been unable to do so. Of note, patient has a history of hypotension and has never been on anti-hypertensive medications until Monday. Daughter states she was very surprised that her mother was found to be hypertensive and prescribed Losartan. Patient was given one dose yesterday.     In ED:  Vitals: T 98, HR 63, /76, RR 18, SpO2 100% on RA  Labs significant for: WBC 3.77, D-dimer 672, Cl 112, anion gap 2, AST 12, ALT 11  CTA: + acute bilateral pulmonary emboli, no evidence of R heart strain  CT Head: Small vessel ischemic changes in both hemispheres with volume loss and involutional change. No acute infarct or hemorrhagic lesion noted. Right frontal meningioma measuring approximately 1.1 cm.  EKG: NSR, rate 62, T wave abnormality   Given: 1L NS bolus x1, Albuterol inhaler x1, Lovenox 50mg x1  COVID 19-PCR -NEG       (28 Apr 2021 19:49)      INTERVAL HPI:  4/29/21: Pt admitted overnight for acute b/l PE. Pt seen and examined. Pt A&Ox1, unsure of why she is here. Pleasant, on RA and denies SOB. Has no acute complaints this AM. On lovenox 50 mg daily for PE. CXR no acute pulm disease, US duplex no dvt b/l. F/u TTE.   4/30/21: No acute overnight events. Per RN, pt did not sleep much throughout the night. Confused, tries to get out of bed but is redirectable and pleasant. Pt seen and examined, pt outside by nursing station as she keeps trying to climb out of bed. Pt confused, A&Ox1, but pleasant. She has no acute complaints. She is tolerating room air. Pt switched to eliquis 10 mg bid. TTE with no heart strain. PT recommending BENJAMIN.   5/1/21: No acute overnight events. Patient is very confused, but redirectable and pleasant. Mildly somnolent but responds to verbal and physical stimuli. A&Ox0-1 but pleasant. Tolerating room air. On Eliquis 10 mg BID. PT recommending BENJAMIN.  5/2/21: No acute overnight events. Pt is confused but alert, very pleasant and re-directable. Tolerating room air, on eliquis 10 mg BID. PT recommending BENJAMIN, pending SW.  5/3/21: No acute overnight events. Pt confused but alert, pleasant, sitting in bed comfortably eating breakfast. Tolerating room air, on eliquis 10 mg BID for PE. PT recommending BENJAMIN. Repeat COVID-19 PCR  positive, however per ID pt day 14 of illness and does not need isolation precautions.   5/4/21: No acute overnight events. Pt pleasant, confused, sitting up in bed in no acute distress. Tolerating room air, has no acute complaints. Discharge pending negative repeat COVID-19 PCR. F/u repeat COVID-19. Per ID no need for isolation precautions.   5/5/21: No acute overnight events. Pt seen and examined. Pleasant, confused, thinks she's at home. Laying in bed in no acute distress. Tolerating room air. No acute complaints. Discharge pending for BENJAMIN. Per ID no need for isolation precautions. Ambulation  5/6/21: No acute complaints. Pt seen and examined. Pleasant, confused, thinks she's at home. Sitting in chair in NAD tolerating room air. No acute complaints. Discussed with PT after re-eval, pt can return home with 24hr assist. Discharge pending. D/W Dtr [phone] about D/C Home .  5/7/21: No acute overnight events. Pt seen and examined. Pt laying in bed comfortably asleep. Tolerating room air. No acute complaints. Discharge pending, pt 24 hr assist at home.      OVERNIGHT EVENTS: none     Home Medications:  losartan 50 mg oral tablet: 1 tab(s) orally once a day (28 Apr 2021 19:49)      MEDICATIONS  (STANDING):  apixaban 10 milliGRAM(s) Oral every 12 hours  loratadine 10 milliGRAM(s) Oral daily  losartan 50 milliGRAM(s) Oral daily  melatonin 3 milliGRAM(s) Oral at bedtime  pantoprazole    Tablet 40 milliGRAM(s) Oral before breakfast  potassium phosphate / sodium phosphate Tablet (K-PHOS No. 2) 1 Tablet(s) Oral four times a day    MEDICATIONS  (PRN):  ALBUTerol    90 MICROgram(s) HFA Inhaler 1 Puff(s) Inhalation every 4 hours PRN Shortness of Breath and/or Wheezing      Allergies    No Known Allergies    Intolerances        REVIEW OF SYSTEMS: "I feel good, just tired"  CONSTITUTIONAL: +Tired; No fever, No chills, No myalgia, No Body ache, No Weakness  EYES: No eye pain,  No visual disturbances, No discharge, NO Redness  ENMT:  No ear pain, No nose bleed, No vertigo; No sinus or throat pain, No Congestion  NECK: No pain, No stiffness  RESPIRATORY: No cough, wheezing, No  hemoptysis, No shortness of breath  CARDIOVASCULAR: No chest pain, palpitations  GASTROINTESTINAL: No abdominal or epigastric pain. No nausea, No vomiting; No diarrhea or constipation. [  ] BM  GENITOURINARY: No dysuria, No frequency, No urgency, No hematuria, or incontinence  NEUROLOGICAL: No headaches, No dizziness, No numbness, No tingling, No tremors, No weakness  EXT: No Swelling, No Pain, No Edema  SKIN:  [ x ] No itching, burning, rashes, or lesions   MUSCULOSKELETAL: No joint pain or swelling; No muscle pain, No back pain, No extremity pain  PSYCHIATRIC: No depression, anxiety, mood swings or difficulty sleeping at night  PAIN SCALE: [x  ] None  [  ] Other-  ROS unreliable due to - [ x ] Dementia  [  ] Lethargy  [  ] Sedated [  ] non verbal  REST OF REVIEW Of SYSTEM - [ x ] Normal     Vital Signs Last 24 Hrs  T(C): 36.9 (07 May 2021 11:25), Max: 36.9 (07 May 2021 11:25)  T(F): 98.5 (07 May 2021 11:25), Max: 98.5 (07 May 2021 11:25)  HR: 75 (07 May 2021 11:25) (67 - 78)  BP: 108/53 (07 May 2021 11:25) (108/53 - 112/67)  BP(mean): --  RR: 18 (07 May 2021 11:25) (18 - 18)  SpO2: 94% (07 May 2021 11:25) (94% - 98%)  Finger Stick        05-06 @ 07:01  -  05-07 @ 07:00  --------------------------------------------------------  IN: 340 mL / OUT: 0 mL / NET: 340 mL        PHYSICAL EXAM:  GENERAL:  [ x ] NAD , [ x ] well appearing, [  ] Agitated, [  ] Drowsy,  [  ] Lethargy, [ x ] confused   HEAD:  [ x ] Normal, [  ] Other  EYES:  [x  ] EOMI, [ x ] PERRLA, [x  ] conjunctiva and sclera clear normal, [  ] Other,  [  ] Pallor,[  ] Discharge  ENMT:  [ x ] Normal, [x  ] Moist mucous membranes, [  ] Good dentition, [ x ] No Thrush  NECK:  [ x ] Supple, [ x ] No JVD, [ x ] Normal thyroid, [  ] Lymphadenopathy [  ] Other  CHEST/LUNG:  [ x ] Clear to auscultation bilaterally,  [x  ] No rales, [ x ] No rhonchi  [ x ]  No wheezing  HEART:  [ x ] Regular rate and rhythm, [  ] tachycardia, [  ] Bradycardia,  [  ] irregular  [ x ] No murmurs, No rubs, No gallops, [  ] PPM in place (Mfr:  )  ABDOMEN:  [x  ] Soft, [ x ] Nontender, [ x ] Nondistended, [ x ] No mass, [ x ] Bowel sounds present, [  ] obese  NERVOUS SYSTEM:  [ x ] Alert & Oriented X1, [ x ] Nonfocal  [ x ] Confusion  [  ] Encephalopathic [  ] Sedated [  ] Unable to assess, [ x ] Other-Dementia  EXTREMITIES: [ x ] 2+ Peripheral Pulses, No clubbing, No cyanosis,  [  ] edema B/L lower EXT. [  ] PVD stasis skin changes B/L Lower EXT  LYMPH: No lymphadenopathy noted  SKIN:  [ x ] No rashes or lesions, [  ] Pressure Ulcers, [  ] ecchymosis, [  ] Skin Tears, [  ] Other    DIET: Diet, Soft (04-28-21 @ 20:31) [Active]    LABS:                        11.4   3.96  )-----------( 171      ( 07 May 2021 07:09 )             34.7     07 May 2021 07:09    144    |  113    |  16     ----------------------------<  82     3.8     |  25     |  0.66     Ca    8.4        07 May 2021 07:09  Phos  3.2       07 May 2021 07:09  Mg     2.3       07 May 2021 07:09          RADIOLOGY & ADDITIONAL TESTS: none in past 24 hr       HEALTH ISSUES - PROBLEM Dx:  Pulmonary embolism, bilateral    Dementia    HTN (hypertension)    Asthma    Seasonal allergies    COVID-19 virus detected    Need for prophylactic measure    Consultant(s) Notes Reviewed:  [ x ] YES     Care Discussed with [ x ] Consultants, [ x ] Patient, [ x ] Family, [  ] HCP, [x  ] , [ x ] Social Service, [ x ] RN, [ x ] Physical Therapy, [  ] Palliative Care Team  DVT PPX: [  ] Lovenox, [  ] SC Heparin, [  ] Coumadin, [  ] Xarelto, [x  ] Eliquis, [  ] Pradaxa, [  ] IV Heparin drip, [  ] SCD, [  ] Ambulation, [  ] Contraindicated 2/2 GI Bleed, [  ] Contraindicated 2/2  Bleed, [  ] Contraindicated 2/2 Brain Bleed  Advanced Directive: [  ] None, [x  ] DNR/DNI

## 2021-05-07 NOTE — PROGRESS NOTE ADULT - PROVIDER SPECIALTY LIST ADULT
Infectious Disease
Neurology
Pulmonology
Internal Medicine
Neurology
Neurology
Pulmonology
Pulmonology
Infectious Disease
Neurology
Pulmonology
Neurology
Neurology
Pulmonology
Pulmonology
Heme/Onc
Internal Medicine
Pulmonology
Pulmonology
Internal Medicine
Internal Medicine
Heme/Onc
Internal Medicine

## 2021-05-07 NOTE — PROGRESS NOTE ADULT - ATTENDING COMMENTS
79 yo female PMH dementia, HTN, asthma controlled, seasonal allergies presents to ED c/o SOB, onset this AM. Patient discharged from [facility] on Monday 4/16 and diagnosed with Covid at that time. Patient admitted for acute bilat pulmonary emboli found on CTA.  pt seen, examined, case & care plan d/w, pt 's Dtr, residents at detail.  D/W dtr today  about D/C Home with 24 hrs care   - PT----> HOME with 24 hrs supervision, COVID PCR +   NO Need for isolation as d/w DR Yen.  PO diet  D/W Social service  & Case management ----> pt stable for d/c home with supervision or HHA  Total care time is 40 minutes. 81 yo female PMH dementia, HTN, asthma controlled, seasonal allergies presents to ED c/o SOB, onset this AM. Patient discharged from [facility] on Monday 4/16 and diagnosed with Covid at that time. Patient admitted for acute bilat pulmonary emboli found on CTA.  pt seen, examined, case & care plan d/w, pt 's Dtr, residents at detail.  D/W dtr today  about D/C Home with 24 hrs care   - PT----> HOME with 24 hrs supervision, COVID PCR +   NO Need for isolation as d/w DR Yen.  PO diet  D/W Social service  & Case management ----> pt stable for d/c home with supervision or HHA  -D/W Social service -Family wants to take pt home today.  Total D/C care time is 60 minutes.  Med to beds arranged for Eliquis .

## 2021-05-07 NOTE — PROGRESS NOTE ADULT - ASSESSMENT
79 yo female PMH dementia, HTN, asthma controlled, seasonal allergies presents to ED c/o SOB, onset this AM. Patient discharged from [facility] on Monday 4/16 and diagnosed with Covid at that time. Patient admitted for acute bilat pulmonary emboli found on CTA likely 2/2 COVID-19 infection. 
81 yo female PMH dementia, HTN, asthma controlled, seasonal allergies Adm w PE    diagnosed with Covid,PCR+ 4/26 symptom onset 4/18  Rxed  with Regen, (4/26)  Pfizer  Covid vaccine  3/26 and  4/16 ,  PCR neg x 2 4/28    Recommendations  pt with acute PE which may be a sequela of breakthrough infection, pt now >10 days after COVID-19 symptom onset and PCR-neg x 2 so should not be an infection risk. Suggest rapid viral clearing due to vaccine and REGN monoclonal cocktail  5/2-COVID PCR+ but day 14 of illness so only an issue for dc planning not isolation precautions required    PE-standard anticoagulation    Fine to discharge with oxygen (4L or less and able to keep sats>90) and even persistent fever at time of discharge.     Routine discharge on DOAC or antiplatelet Rx no longer recommended in [facility] treatment guidelines or supported by studies to date so unless otherwise indicated rec dc without anticoagulation.     If pt going to facility other than [facility] with exceptions may require 2 negative PCR tests  by a minimum of 24 hours and fever free without antipyretics for >24hrs and more than 10 days from first positive test.     Thank you for consulting us and involving us in the management of this most interesting and challenging case.  Please call us at [phone] or text me directly on my cell#[phone] with any concerns or further questions.        
81 yo female PMH dementia, HTN, asthma controlled, seasonal allergies presents to ED c/o SOB, onset this AM. Patient discharged from [facility] on Monday 4/16 and diagnosed with Covid at that time. Patient admitted for acute bilat pulmonary emboli found on CTA likely 2/2 COVID-19 infection. 
Covid PCR positive on May 6th - delays DC Planning -   81 yo female PMH dementia, HTN, asthma controlled, seasonal allergies presents to ED c/o SOB, onset this AM. Patient discharged from [facility] on Monday 4/16 and diagnosed with Covid at that time. Patient admitted for acute bilat pulmonary emboli found on CTA.   dementia - assist with needs and ADL  monitor VS and HD and Sat  proBNP and CE noted - no evidence of RV strain  TTE reviewed, LE doppler NEG for DVT  CT chest reviewed - bilateral PE - on Eliquis -   Asthma - monitor for sx - PRN bronchodilators -   GOC updated - pt is DNR  Covid PCR positive May 2nd - isol precs
79 yo female PMH dementia, HTN, asthma controlled, seasonal allergies presents to ED c/o SOB, onset this AM. Patient discharged from [facility] on Monday 4/16 and diagnosed with Covid at that time. Patient admitted for acute bilat pulmonary emboli found on CTA.   dementia - assist with needs and ADL  monitor VS and HD and Sat  proBNP and CE noted - no evidence of RV strain  TTE pending report, LE doppler NEG for DVT  CT chest reviewed - bilateral PE - on Lovenox BID weight based  Covid PCR neg  Asthma - monitor for sx - PRN bronchodilators - 
81 yo female PMH dementia, HTN, asthma controlled, seasonal allergies Adm w PE    diagnosed with Covid,PCR+ 4/26 symptom onset 4/18  Rxed  with Regen, (4/26)  Pfizer  Covid vaccine  3/26 and  4/16 ,  PCR neg x 2 4/28    Recommendations  pt with acute PE which may be a sequela of breakthrough infection, pt now >10 days after COVID-19 symptom onset and PCR-neg x 2 so should not be an infection risk. Suggest rapid viral clearing due to vaccine and REGN monoclonal cocktail  5/2-COVID PCR+ but day 14 of illness so only an issue for dc planning    PE-standard anticoagulation    
79 yo female PMH dementia, HTN, asthma controlled, seasonal allergies presents to ED c/o SOB, onset this AM. Patient discharged from [facility] on Monday 4/16 and diagnosed with Covid at that time. Patient admitted for acute bilat pulmonary emboli found on CTA.   dementia - assist with needs and ADL  monitor VS and HD and Sat  proBNP and CE noted - no evidence of RV strain  TTE reviewed, LE doppler NEG for DVT  CT chest reviewed - bilateral PE - on Eliquis -   Asthma - monitor for sx - PRN bronchodilators -   GOC updated - pt is DNR  Covid PCR positive May 2nd - isol precs
81 yo female PMH dementia, HTN, asthma controlled, seasonal allergies presents to ED c/o SOB, onset this AM. Patient discharged from [facility] on Monday 4/16 and diagnosed with Covid at that time. Patient admitted for acute bilat pulmonary emboli found on CTA.   dementia - assist with needs and ADL  monitor VS and HD and Sat  proBNP and CE noted - no evidence of RV strain  TTE reviewed, LE doppler NEG for DVT  CT chest reviewed - bilateral PE - on Eliquis -   Asthma - monitor for sx - PRN bronchodilators -   GOC updated - pt is DNR  Covid PCR positive May 2nd - isol precs
81 yo female PMH dementia, HTN, asthma controlled, seasonal allergies presents to ED c/o SOB, onset this AM. Patient discharged from [facility] on Monday 4/16 and diagnosed with Covid at that time. Patient admitted for acute bilat pulmonary emboli found on CTA.   dementia - assist with needs and ADL  monitor VS and HD and Sat  proBNP and CE noted - no evidence of RV strain  TTE reviewed, LE doppler NEG for DVT  CT chest reviewed - bilateral PE - on Eliquis -   Covid PCR neg  Asthma - monitor for sx - PRN bronchodilators -   GOC updated - pt is DNR
79 yo female PMH dementia, HTN, asthma controlled, seasonal allergies Adm w PE    diagnosed with Covid,PCR+ 4/26 symptom onset 4/18  Rxed  with Regen, (4/26)  Pfizer  Covid vaccine  3/26 and  4/16 ,  PCR neg x 2 4/28    Recommendations  pt with acute PE which may be a sequela of breakthrough infection, pt now >10 days after COVID-19 symptom onset and PCR-neg x 2 so should not be an infection risk. Suggest rapid viral clearing due to vaccine and REGN monoclonal cocktail, supportive care as needed    PE-standard anticoagulation        
Covid PCR positive on repeat on May 4th -   81 yo female PMH dementia, HTN, asthma controlled, seasonal allergies presents to ED c/o SOB, onset this AM. Patient discharged from [facility] on Monday 4/16 and diagnosed with Covid at that time. Patient admitted for acute bilat pulmonary emboli found on CTA.   dementia - assist with needs and ADL  monitor VS and HD and Sat  proBNP and CE noted - no evidence of RV strain  TTE reviewed, LE doppler NEG for DVT  CT chest reviewed - bilateral PE - on Eliquis -   Asthma - monitor for sx - PRN bronchodilators -   GOC updated - pt is DNR  Covid PCR positive May 2nd - isol precs
covid PCR positive on May 2nd - remains on RA - vs noted - labs reviewed -   81 yo female PMH dementia, HTN, asthma controlled, seasonal allergies presents to ED c/o SOB, onset this AM. Patient discharged from [facility] on Monday 4/16 and diagnosed with Covid at that time. Patient admitted for acute bilat pulmonary emboli found on CTA.   dementia - assist with needs and ADL  monitor VS and HD and Sat  proBNP and CE noted - no evidence of RV strain  TTE reviewed, LE doppler NEG for DVT  CT chest reviewed - bilateral PE - on Eliquis -   Asthma - monitor for sx - PRN bronchodilators -   [facility] updated - pt is DNR  
BL Pulmonary embolism possible provoked by COVID19 infection  Duplex LE with no DVT.    Recent COVID infection at Hospitalization at [facility]  Clinically comfortable and hemodynamically stable    COVID19 infection 04/26/21  Given ?Rregeneron Monoclonal antibody [MAB] for COVID  ER visit at [facility] 04/26/21 shwowed pt to be HTN with /100, CXR clear.   CT brain nc: showed cerebral atrophy, no CVA  Sx onset per ID, ~ 04/18/21. Likely interval infection between 1st and 2nd dose of vaccine.   Had COVID Pfizer vaccine 03/26/21 and 04/16/21  COVID PCR x2 negative.   Relative rapid clearance due to MAB and recent vaccination, already mounting immune response.  COVID19 Yemi IgG>250 but tested post MAB tx. Uncleasr if corss reactivity with MAB given    RECOMMEND  Follow respiratory status    Continue eliquis will require 6 months of treatment    Mgmt of recent COVID infection per ID  no further heme intervention required. please call if additional evaluation required  Discussed with Dr. Wallace    Thank you for consulting us.   No additional recommendations at current time.   Will sign off on case for now.   Please call, or re-consult if needed.     
on RA - vs noted - labs reviewed -   81 yo female PMH dementia, HTN, asthma controlled, seasonal allergies presents to ED c/o SOB, onset this AM. Patient discharged from [facility] on Monday 4/16 and diagnosed with Covid at that time. Patient admitted for acute bilat pulmonary emboli found on CTA.   dementia - assist with needs and ADL  monitor VS and HD and Sat  proBNP and CE noted - no evidence of RV strain  TTE reviewed, LE doppler NEG for DVT  CT chest reviewed - bilateral PE - on Eliquis -   Covid PCR neg  Asthma - monitor for sx - PRN bronchodilators -   GOC updated - pt is DNR
79 yo female PMH dementia, HTN, asthma controlled, seasonal allergies presents to ED c/o SOB, onset this AM. Patient discharged from [facility] on Monday 4/16 and diagnosed with Covid at that time. Patient admitted for acute bilat pulmonary emboli found on CTA. 
BL Pulmonary embolism possible provoked by COVID19 infection  Duplex LE with no DVT.    Recent COVID infection at Hospitalization at [facility]  Clinically comfortable and hemodynamically stable    COVID19 infection 04/26/21  Given ?Rregeneron Monoclonal antibody [MAB] for COVID  ER visit at [facility] 04/26/21 shwowed pt to be HTN with /100, CXR clear.   CT brain nc: showed cerebral atrophy, no CVA  Sx onset per ID, ~ 04/18/21. Likely interval infection between 1st and 2nd dose of vaccine.   Had COVID Pfizer vaccine 03/26/21 and 04/16/21  COVID PCR x2 negative.   Relative rapid clearance due to MAB and recent vaccination, already mounting immune response.  COVID19 Yemi IgG>250 but tested post MAB tx. Uncleasr if corss reactivity with MAB given    RECOMMEND  Follow respiratory status    Continue lovenox and can bridge to DOAC.  will require 6 months of treatment  When stable transition ot Eliquis 10mg q12h xc7d, then change to 5mg q12h thereafter.     Mgmt of recent COVID infection per ID  Discussed with Dr. Wallace    Thank you for consulting us.   No additional recommendations at current time.   Will sign off on case for now.   Please call, or re-consult if needed.     
79 yo female PMH dementia, HTN, asthma controlled, seasonal allergies presents to ED c/o SOB, onset this AM. Patient discharged from [facility] on Monday 4/16 and diagnosed with Covid at that time. Patient admitted for acute bilat pulmonary emboli found on CTA likely 2/2 COVID-19 infection. 
79 yo female PMH dementia, HTN, asthma controlled, seasonal allergies presents to ED c/o SOB, onset this AM. Patient discharged from [facility] on Monday 4/16 and diagnosed with Covid at that time. Patient admitted for acute bilat pulmonary emboli found on CTA likely 2/2 COVID-19 infection. 
79 yo female PMH dementia, HTN, asthma controlled, seasonal allergies presents to ED c/o SOB, onset this AM. Patient discharged from [facility] on Monday 4/16 and diagnosed with Covid at that time. Patient admitted for acute bilat pulmonary emboli found on CTA. 
79 yo female PMH dementia, HTN, asthma controlled, seasonal allergies presents to ED c/o SOB, onset this AM. Patient discharged from [facility] on Monday 4/16 and diagnosed with Covid at that time. Patient admitted for acute bilat pulmonary emboli found on CTA. 
79 yo female PMH dementia, HTN, asthma controlled, seasonal allergies presents to ED c/o SOB, onset this AM. Patient discharged from [facility] on Monday 4/16 and diagnosed with Covid at that time. Patient admitted for acute bilat pulmonary emboli found on CTA. 
79 yo female PMH dementia, HTN, asthma controlled, seasonal allergies presents to ED c/o SOB, onset this AM. Patient discharged from [facility] on Monday 4/16 and diagnosed with Covid at that time. Patient admitted for acute bilat pulmonary emboli found on CTA likely 2/2 COVID-19 infection.

## 2021-05-07 NOTE — PROGRESS NOTE ADULT - PROBLEM SELECTOR PLAN 5
-Continue loratadine daily.

## 2021-05-07 NOTE — PROGRESS NOTE ADULT - SUBJECTIVE AND OBJECTIVE BOX
Neurology Follow up note    NAIRUDH RICHARDSON80yFemale    HPI:  79 yo female PMH dementia, HTN, asthma controlled, seasonal allergies presents to ED c/o SOB. Onset this AM. Hx obtained from daughter Yanelis Fraire as patient has hx of dementia with significant mental decline for past 1-2 months. Daughter states on Monday AM patient went to neighbors house stating she was SOB. Neighbor called the ambulance and patient was transferred to [facility] and diagnosed with Covid,+ Rxed  with Regen, was observation  daughter does not know how patient got covid as patient is largely home bound with no visitors. Patient was d/c the same day (4/26) with albuterol inhaler and Losartan as pt was hypertensive. This AM, patient had similar episode where she went to [facility] and c/o SOB, so neighbor called ambulance and patient was transferred to [place]. Patient seen and examined at bedside, is a poor historian, A&Ox1 (person). Patient states she was SOB this AM but does not know why she is here. C/o chest tightness but denies cough, wheezing, CP, palpitations, fevers, chills, nausea, abd pain. When asked if patient was in hospital on Monday, she replied "I suppose so." Per daughter, patient with significant mental decline since March. Patient received the Pfizer  Covid vaccine  3/26   and since then has had a progressive decline in mental status 2nd dose Vaccine was given 4/16 , Prior to March patient was A&Ox3 and independent, however since then patient has had increased confusion, found wandering to [facility]. Patient does not have a PCP, has seen Dr. Clements once for pre-op clearance for cataract surgery 1 year ago. Daughter states they have been trying to make an appointment with a neurologist for her cognitive decline but have been unable to do so. Of note, patient has a history of hypotension and has never been on anti-hypertensive medications until Monday. Daughter states she was very surprised that her mother was found to be hypertensive and prescribed Losartan. Patient was given one dose yesterday.       Labs significant for: WBC 3.77, D-dimer 672, Cl 112, anion gap 2, AST 12, ALT 11  CTA: + acute bilateral pulmonary emboli, no evidence of R heart strain  CT Head: Small vessel ischemic changes in both hemispheres with volume loss and involutional change. No acute infarct or hemorrhagic lesion noted. Right frontal meningioma measuring approximately 1.1 cm.  EKG: NSR, rate 62, T wave abnormality   Given: 1L NS bolus x1, Albuterol inhaler x1, Lovenox 50mg x1  COVID 19-PCR -NEG       (28 Apr 2021 19:49)      Interval History - no new complaints    Patient is seen, chart was reviewed and case was discussed with the treatment team.  Pt is not in any distress.   Lying on bed comfortably.     Vital Signs Last 24 Hrs  T(C): 36.9 (07 May 2021 11:25), Max: 36.9 (07 May 2021 11:25)  T(F): 98.5 (07 May 2021 11:25), Max: 98.5 (07 May 2021 11:25)  HR: 75 (07 May 2021 11:25) (67 - 78)  BP: 108/53 (07 May 2021 11:25) (108/53 - 112/67)  BP(mean): --  RR: 18 (07 May 2021 11:25) (18 - 18)  SpO2: 94% (07 May 2021 11:25) (94% - 98%)          REVIEW OF SYSTEMS:    Constitutional: No fever,   Eyes: No eye pain, visual disturbances, or discharge  ENT:  No difficulty hearing, tinnitus, vertigo; No sinus or throat pain  Neck: No pain or stiffness  Respiratory: No , wheezing, chills or hemoptysis  Cardiovascular: No chest pain, palpitations, s  Gastrointestinal: No abdominal or epigastric pain. No nausea, vomiting or hematemesis;   Genitourinary: No dysuria, frequency, hematuria or incontinence  Neurological: No headaches,   Psychiatric: No d, mood swings or difficulty sleeping  Musculoskeletal: No joint pain or swelling; No muscle, back or extremity pain  Skin: No itching, burning, rashes or lesions   Lymph Nodes: No enlarged glands  Endocrine: No heat or cold intolerance;  Allergy and Immunologic: No hives or eczema    On Neurological Examination:    Mental Status - Pt is alert, awake, oriented X1  . Follows commands well and able to answer questions appropriately    Speech -  Normal.    Cranial Nerves - Pupils 3 mm equal and reactive to light, extraocular eye movements intact. Pt has no visual field deficit.  Pt has no facial asymmetry. Facial sensation is intact  .Tongue - is in midline.    Muscle tone - is normal     Motor Exam - 4+/5 all over,   No drift. No shaking or tremors.    Sensory Exam . Pt withdraws all extremities equally on stimulation. No asymmetry seen. No complaints of tingling, numbness.        coordination:    Finger to nose: normal    Heel to shin: normal    Deep tendon Reflexes - 2 plus all over.        Romberg - Negative.    Neck Supple -  Yes.     MEDICATIONS    ALBUTerol    90 MICROgram(s) HFA Inhaler 1 Puff(s) Inhalation every 4 hours PRN  apixaban 10 milliGRAM(s) Oral every 12 hours  loratadine 10 milliGRAM(s) Oral daily  losartan 50 milliGRAM(s) Oral daily  melatonin 3 milliGRAM(s) Oral at bedtime  pantoprazole    Tablet 40 milliGRAM(s) Oral before breakfast  potassium phosphate / sodium phosphate Tablet (K-PHOS No. 2) 1 Tablet(s) Oral four times a day      Allergies    No Known Allergies    Intolerances             05-07    144  |  113<H>  |  16  ----------------------------<  82  3.8   |  25  |  0.66    Ca    8.4<L>      07 May 2021 07:09  Phos  3.2     05-07  Mg     2.3     05-07                          11.4   3.96  )-----------( 171      ( 07 May 2021 07:09 )             34.7       Hemoglobin A1C:     Vitamin B12     RADIOLOGY  < from: CT Head No Cont (04.28.21 @ 19:37) >    EXAM:  CT BRAIN                            PROCEDURE DATE:  04/28/2021          INTERPRETATION:  INDICATION:  Confusion  TECHNIQUE:  A non contrast 2.5 or 3 mm axial CT study of the brain was performed from skull base to vertex. Coronal and sagittal reformations were generated from the axial data.  COMPARISON EXAMINATION:  No prior    FINDINGS:    HEMISPHERES:  There are small vessel ischemic changes in both hemispheres with volume loss and involutional change. No acute infarct or hemorrhagic lesion is noted.  VENTRICLES:  Midline and normal in size.  POSTERIOR FOSSA:  The brain stem and cerebellum are unremarkable.  No CP angle lesion noted.  EXTRACEREBRAL SPACES:  No subdural or epidural collections are noted.  SKULL BASE AND CALVARIUM:  Appears intact.  No fracture or destructive lesion is identified.  SINUSES AND MASTOIDS:  Clear.  MISCELLANEOUS:  A right frontal meningioma measures approximately 1.1 cm.    IMPRESSION:    1)  small vessel ischemic changes in both hemispheres with volume loss and involutional change. No acute infarct or hemorrhagic lesion noted.  2)  right frontal meningioma measuring approximately 1.1 cm.              ADAM ROSS MD; Attending Radiologist  This document has been electronically signed. Apr 28 2021  7:35PM        ASSESSMENT AND PLAN:      seen for ams-  metabolic encephalopathy  dementia  PE  PCR positive for covid- 19    NO FURTHER NEURO W/U  on AC  Physical therapy evaluation.  OOB to chair/ambulation with assistance only.  Advanced care planning was discussed with family.  Pain is accessed and addressed.

## 2021-05-07 NOTE — PROGRESS NOTE ADULT - SUBJECTIVE AND OBJECTIVE BOX
Date/Time Patient Seen:  		  Referring MD:   Data Reviewed	       Patient is a 80y old  Female who presents with a chief complaint of SOB (05 May 2021 10:44)      Subjective/HPI     PAST MEDICAL & SURGICAL HISTORY:  No pertinent past medical history    Uncomplicated asthma, unspecified asthma severity  past history - currently stable with no medications    HTN (hypertension)    No significant past surgical history    S/P cataract surgery    History of partial hysterectomy          Medication list         MEDICATIONS  (STANDING):  apixaban 10 milliGRAM(s) Oral every 12 hours  loratadine 10 milliGRAM(s) Oral daily  losartan 50 milliGRAM(s) Oral daily  melatonin 3 milliGRAM(s) Oral at bedtime  pantoprazole    Tablet 40 milliGRAM(s) Oral before breakfast  potassium phosphate / sodium phosphate Tablet (K-PHOS No. 2) 1 Tablet(s) Oral four times a day    MEDICATIONS  (PRN):  ALBUTerol    90 MICROgram(s) HFA Inhaler 1 Puff(s) Inhalation every 4 hours PRN Shortness of Breath and/or Wheezing         Vitals log        ICU Vital Signs Last 24 Hrs  T(C): 36.7 (07 May 2021 04:16), Max: 36.7 (06 May 2021 19:25)  T(F): 98 (07 May 2021 04:16), Max: 98 (06 May 2021 19:25)  HR: 67 (07 May 2021 04:16) (64 - 78)  BP: 112/67 (07 May 2021 04:16) (111/59 - 147/66)  BP(mean): --  ABP: --  ABP(mean): --  RR: 18 (07 May 2021 04:16) (16 - 18)  SpO2: 97% (07 May 2021 04:16) (97% - 98%)           Input and Output:  I&O's Detail    05 May 2021 07:01  -  06 May 2021 07:00  --------------------------------------------------------  IN:    Oral Fluid: 474 mL  Total IN: 474 mL    OUT:  Total OUT: 0 mL    Total NET: 474 mL      06 May 2021 07:01  -  07 May 2021 06:37  --------------------------------------------------------  IN:    Oral Fluid: 340 mL  Total IN: 340 mL    OUT:  Total OUT: 0 mL    Total NET: 340 mL          Lab Data                        11.7   5.23  )-----------( 179      ( 06 May 2021 07:09 )             35.7     05-06    143  |  110<H>  |  18  ----------------------------<  81  3.9   |  27  |  0.65    Ca    8.5      06 May 2021 07:09  Phos  3.1     05-06  Mg     2.2     05-06              Review of Systems	      Objective     Physical Examination  heart s1s2  lung dec BS  abd soft  head nc  head at        Pertinent Lab findings & Imaging      Marta:  NO   Adequate UO     I&O's Detail    05 May 2021 07:01  -  06 May 2021 07:00  --------------------------------------------------------  IN:    Oral Fluid: 474 mL  Total IN: 474 mL    OUT:  Total OUT: 0 mL    Total NET: 474 mL      06 May 2021 07:01  -  07 May 2021 06:37  --------------------------------------------------------  IN:    Oral Fluid: 340 mL  Total IN: 340 mL    OUT:  Total OUT: 0 mL    Total NET: 340 mL               Discussed with:     Cultures:	        Radiology

## 2021-05-07 NOTE — PROGRESS NOTE ADULT - PROBLEM SELECTOR PLAN 2
-Per daughter, patient with increased confusion past 1-2 months  -CT head +Small vessel ischemic changes in both hemispheres with volume loss and involutional change. No acute infarct or hemorrhagic lesion noted. Right frontal meningioma measuring approximately 1.1 cm.  -Likely 2/2 worsening dementia  -COVID PCR neg, RVP neg. Repeat COVID-19 PCR for dispo positive however per ID Dr. Yen pt is day 14 of infection, had antibody infusion, and fully vaccinated and does not need isolation.   -Thyroid panel, B12, folate all wnl  -Fall precautions, aspiration precautions  -PT consulted, initially recommended BENJAMIN but on re-evaluation patient is ok to go home with 24hr assist from family  -Neuro consulted Dr. Corea, recs appreciated   -Palliative Santhosh consulted for GOC discussion, DNR/DNI
-Per daughter, patient with increased confusion past 1-2 months  -CT head +Small vessel ischemic changes in both hemispheres with volume loss and involutional change. No acute infarct or hemorrhagic lesion noted. Right frontal meningioma measuring approximately 1.1 cm.  -Likely 2/2 worsening dementia  -COVID PCR neg, RVP neg. Repeat COVID-19 PCR for dispo positive however per ID Dr. Yen pt is >day 14 of infection, had antibody infusion, and fully vaccinated and does not need isolation.   -Thyroid panel, B12, folate all wnl  -Fall precautions, aspiration precautions  -PT consulted, initially recommended BENJAMIN but on re-evaluation patient is ok to go home with 24hr assist from family  -Neuro consulted Dr. Corea, recs appreciated   -Palliative Santhosh consulted for GOC discussion, DNR/DNI
-Per daughter, patient with increased confusion past 1-2 months  -CT head +Small vessel ischemic changes in both hemispheres with volume loss and involutional change. No acute infarct or hemorrhagic lesion noted. Right frontal meningioma measuring approximately 1.1 cm.  -Likely 2/2 worsening dementia  -COVID PCR neg, RVP neg. Repeat COVID-19 PCR for dispo positive however per ID Dr. Yen pt is day 14 of infection, had antibody infusion, and fully vaccinated and does not need isolation.   -Thyroid panel, B12, folate all wnl  -Fall precautions, aspiration precautions  -PT consulted, recommend BENJAMIN  -Neuro consulted Dr. Corea, recs appreciated   -Palliative Santhosh consulted for GOC discussion, DNR/DNI  -SW/CM consult, dispo planning to [facility] pending repeat COVID-19 PCR neg
-Per daughter, patient with increased confusion past 1-2 months  -CT head +Small vessel ischemic changes in both hemispheres with volume loss and involutional change. No acute infarct or hemorrhagic lesion noted. Right frontal meningioma measuring approximately 1.1 cm.  -Likely 2/2 worsening dementia  -COVID PCR neg, RVP neg. Repeat COVID-19 PCR for dispo positive however per ID Dr. Yen pt is day 14 of infection, had antibody infusion, and fully vaccinated and does not need isolation.   -Thyroid panel, B12, folate all wnl  -Fall precautions, aspiration precautions  -PT consulted, recommend BENJAMIN  -Neuro consulted Dr. Corea, recs appreciated   -Palliative Santhosh consulted for GOC discussion, DNR/DNI  -SW/CM consult, dispo planning to [facility] pending

## 2021-05-07 NOTE — PROGRESS NOTE ADULT - PROBLEM SELECTOR PLAN 6
-[facility] records reviewed , pt was COVID + 4/26, s/p monoclonal AB given   -Pt has been Fully Vaccinated 2 inj completed.   -Pt's initial SOB was 1 week prior to [facility]. Per ID, pt no longer contagious  -Repeat COVID-19 PCR for dispo positive, however per ID Dr. Yen pt is >day 14 of COVID-19, had antibody infusion, and fully vaccinated and does not need isolation.
-[facility] records reviewed , pt was COVID + 4/26, s/p monoclonal AB given   -Pt has been Fully Vaccinated 2 inj completed.   -Pt's initial SOB was 1 week prior to [facility]. Per ID, pt no longer contagious  -Repeat COVID-19 PCR for dispo positive, however per ID Dr. Yen pt is >day 14 of COVID-19, had antibody infusion, and fully vaccinated and does not need isolation.
-DVT PPx: eliquis 10 mg bid   -Protonix daily.
-[facility] records reviewed , pt was COVID + 4/26, s/p monoclonal AB given   -Pt has been Fully Vaccinated 2 inj completed.   -Pt's initial SOB was 1 week prior to [facility]. Per ID, pt no longer contagious  -Repeat COVID-19 PCR for dispo positive, however per ID Dr. Yen pt is >day 14 of COVID-19, had antibody infusion, and fully vaccinated and does not need isolation.

## 2021-05-07 NOTE — PROGRESS NOTE ADULT - PROBLEM SELECTOR PLAN 1
-CTA showing acute bilateral pulmonary emboli, no evidence of R heart strain   -EKG showing NSR, rate 62, t wave abnormality, no signs of R heart strain  -TTE shows no heart strain  -Continue to monitor on tele with cont pulse ox, pt on RA  -Continue with eliquis 10 mg BID, loading dose followed   -B/l dopplers lower extremities neg for DVT   -CXR neg for active pulm disease   -Pulm Dr. Spangler following   -Heme Dr. Bah d/w [facility] records reviewed , pt was COVID + 4/26, s/p monoclonal AB given , Pt has been Fully Vaccinated 2 inj completed. pt's initial SOB was 1 week prior to [facility]. Per ID, pt no longer contagious  -Repeat COVID-19 PCR for dispo positive, however per ID Dr. Yen pt is day 14 of COVID-19, had antibody infusion, and fully vaccinated and does not need isolation.
-CTA showing acute bilateral pulmonary emboli, no evidence of R heart strain   -EKG showing NSR, rate 62, t wave abnormality, no signs of R heart strain  -TTE shows no heart strain  -Continue to monitor on tele with cont pulse ox, pt on RA  -Last dose of eliquis 10 mg was this morning. Tonight pt starts eliquis 5 mg BID. Discussed with pharmacy for Meds to beds.   -B/l dopplers lower extremities neg for DVT   -CXR neg for active pulm disease   -Pulm Dr. Spangler following   -Heme Dr. Bah d/w [facility] records reviewed , pt was COVID + 4/26, s/p monoclonal AB given , Pt has been Fully Vaccinated 2 inj completed. pt's initial SOB was 1 week prior to [facility]. Per ID, pt no longer contagious  -Repeat COVID-19 PCR for dispo positive, however per ID Dr. Yen pt is >day 14 of COVID-19, had antibody infusion, and fully vaccinated and does not need isolation.
-CTA showing acute bilateral pulmonary emboli, no evidence of R heart strain   -EKG showing NSR, rate 62, t wave abnormality, no signs of R heart strain  -TTE shows no heart strain  -Continue to monitor on tele with cont pulse ox, pt on RA  -Continue with eliquis 10 mg BID, last dose tomorrow morning. Tomorrow evening pt starts eliquis 5 mg BID. Discussed with pharmacy for Meds to beds.   -B/l dopplers lower extremities neg for DVT   -CXR neg for active pulm disease   -Pulm Dr. Spangler following   -Heme Dr. Bah d/w [facility] records reviewed , pt was COVID + 4/26, s/p monoclonal AB given , Pt has been Fully Vaccinated 2 inj completed. pt's initial SOB was 1 week prior to [facility]. Per ID, pt no longer contagious  -Repeat COVID-19 PCR for dispo positive, however per ID Dr. Yen pt is day 14 of COVID-19, had antibody infusion, and fully vaccinated and does not need isolation.
-CTA showing acute bilateral pulmonary emboli, no evidence of R heart strain   -EKG showing NSR, rate 62, t wave abnormality, no signs of R heart strain  -TTE shows no heart strain  -Continue to monitor on tele with cont pulse ox, pt on RA  -Continue with eliquis 10 mg BID, loading dose followed   -B/l dopplers lower extremities neg for DVT   -CXR neg for active pulm disease   -Pulm Dr. Spangler following   -Heme Dr. Bah d/w [facility] records reviewed , pt was COVID + 4/26, s/p monoclonal AB given , Pt has been Fully Vaccinated 2 inj completed. pt's initial SOB was 1 week prior to [facility]. Per ID, pt no longer contagious  -Repeat COVID-19 PCR for dispo positive, however per ID Dr. Yen pt is day 14 of COVID-19, had antibody infusion, and fully vaccinated and does not need isolation.

## 2021-05-07 NOTE — PROGRESS NOTE ADULT - PROBLEM SELECTOR PROBLEM 7
Need for prophylactic measure
COVID-19 virus detected

## 2021-05-07 NOTE — DISCHARGE NOTE NURSING/CASE MANAGEMENT/SOCIAL WORK - PATIENT PORTAL LINK FT
You can access the FollowMyHealth Patient Portal offered by [facility] by registering at the following website: [email or web address]. By joining [employer]’s FollowMyHealth portal, you will also be able to view your health information using other applications (apps) compatible with our system.

## 2021-05-07 NOTE — PROGRESS NOTE ADULT - PROBLEM SELECTOR PROBLEM 5
Asthma
Seasonal allergies

## 2021-07-20 NOTE — PROGRESS NOTE ADULT - PROBLEM SELECTOR PLAN 4
Care Management Initial Consult    General Information  Assessment completed with: Mandy Mccray  Type of CM/SW Visit: Initial Assessment    Primary Care Provider verified and updated as needed: Yes   Readmission within the last 30 days: no previous admission in last 30 days         Advance Care Planning: Advance Care Planning Reviewed: other (comment)  declined       Communication Assessment  Patient's communication style: spoken language (English or Bilingual)    Hearing Difficulty or Deaf: no   Wear Glasses or Blind: yes    Cognitive  Cognitive/Neuro/Behavioral: (P) WDL                      Living Environment:   People in home: child(jennifer), dependent, spouse     Current living Arrangements: house      Able to return to prior arrangements: yes       Family/Social Support:  Care provided by:  self  Provides care for: children 12.14.16   Marital Status:   Wife          Description of Support System: Supportive    Support Assessment: Adequate family and caregiver support    Current Resources:   Patient receiving home care services:       Community Resources:    Equipment currently used at home: none  Supplies currently used at home:      Employment/Financial:  Employment Status: employed full-time        Financial Concerns: No concerns identified   Referral to Financial Counselor: No       Lifestyle & Psychosocial Needs:  Social Determinants of Health     Tobacco Use: Low Risk      Smoking Tobacco Use: Never Smoker     Smokeless Tobacco Use: Never Used   Alcohol Use:      Frequency of Alcohol Consumption:      Average Number of Drinks:      Frequency of Binge Drinking:    Financial Resource Strain:      Difficulty of Paying Living Expenses:    Food Insecurity:      Worried About Running Out of Food in the Last Year:      Ran Out of Food in the Last Year:    Transportation Needs:      Lack of Transportation (Medical):      Lack of Transportation (Non-Medical):    Physical Activity:      Days of Exercise per 
Week:      Minutes of Exercise per Session:    Stress:      Feeling of Stress :    Social Connections:      Frequency of Communication with Friends and Family:      Frequency of Social Gatherings with Friends and Family:      Attends Judaism Services:      Active Member of Clubs or Organizations:      Attends Club or Organization Meetings:      Marital Status:    Intimate Partner Violence:      Fear of Current or Ex-Partner:      Emotionally Abused:      Physically Abused:      Sexually Abused:    Depression:      PHQ-2 Score:    Housing Stability:      Unable to Pay for Housing in the Last Year:      Number of Places Lived in the Last Year:      Unstable Housing in the Last Year:        Functional Status:  Prior to admission patient needed assistance: Pt was independent at baseline.              Mental Health Status:  Mental Health Status: No Current Concerns       Chemical Dependency Status:  Chemical Dependency Status: No Current Concerns             Values/Beliefs:  Spiritual, Cultural Beliefs, Judaism Practices, Values that affect care: no               Additional Information:  SW met with pt in pt room Pt agreeable to vist. Pt aware of observation status. Pt lives in private residence with 3 sons and wife, works full time and is independent at baseline. Pt declined ACP documents. Pt PCP verified. Pt denies needs at discharge. Wife to transport.     Marcia Saldivar MSW      
-Chronic, controlled  -Patient intermittently on Albuterol inhaler at home  -Continue PRN.

## 2022-09-07 NOTE — ED ADULT TRIAGE NOTE - LOCATION:
"Date of Service: September 7, 2022  Time In: 11:01 am  Time Out: 11:57 am    PROGRESS NOTE  Data:The patient is a 70 y.o. person who met 1:1 with Yumiko Miles LCSW,[facility] for regularly scheduled individual session.Verified the patients identity.    Maggie presents for session on time, clean and casually dressed  without evidence of intoxication, withdrawal, or perceptual disturbance.   The patient was open and engaged.      Chief Compliant: Patient presents with anxiety and depression    Interactive Complexity: Interactive Complexity No      HPI: Data:  Patient shares that she tripped on a pillow and fell and hit her head causing her eye to turn black.  She saw a doctor yesterday for cortizone shot in her wrist yesterday who did look at it. Has continued to struggle with back pain every morning but has follow up next week.  Patient has had more anxiety and depression for a few weeks as  was being unkind.  He continues to refuse to pay her bills even though there is money in \"his account\".  At the last minute  went to granddaughters game and  became frustrated, angry and called her names, stupid, worthless and just mean.  IT was so bad she paid her son-in-law to drive her home.    Depression Low mood for > 2 weeks, Decreased/Increased sleep, Loss of Interest, Feelings of guilt/worthlessness, Low energy and Impaired concentration  Generalized Anxiety  Excess Worry, Restless/Edgy, Easily fatigued, Muscle tension and Decreased concentration. Onset of symptoms was vague.  Symptoms are associated with financial burdens, chronic pain/pain management and lack of support.  Symptoms are aggravated by anxiety, lonely, sadness and stress.   Symptoms improve with medication management, therapy and personal self-care (wellness) Current rates severity of symptoms, on a scale of 1-10 (10 is the most severe) 7 Context Family and social history was reviewed  Quality worsened.    CLINICAL " MANEUVERING/INTERVENTION/SUPPORTIVE PSYCHOTHERAPY: Therapist continued to promote the therapeutic alliance, address the patient’s issues, and strengthen self awareness, insights, and coping skills.  Processing intense thoughts and feelings.  Therapist applied CBT/REBT, Exploration of Coping Skills and Interactive Feedback and encouraged the patient to use positive coping skills such as Exercising, Drawing/Art, Meditation/Practice yoga, Reframe the way you are thinking about the problem, Self Care (Take care of your body in a way that makes you feel good - paint your nails, do your hair, put on a face mask), Use positive self-talk, Keep a positive attitude, Make a list of choices (pros/cons), Take deep breaths, Ignore people and Utilize resources/coping skills.  Therapist allowed Maggie  to freely discuss issues without interruption or judgment. Provided safe, confidential environment to facilitate the development of positive therapeutic relationship and encourage open, honest communication. Assisted patient in identifying increased risk factors which would indicate the need for higher level of care including thoughts to harm self or others, self-harming behavior, and/or binge drinking and encouraged patient to contact this office, call 911, or present to the nearest emergency room should any of these events occur. Discussed crisis intervention services and means to access.       Risk Assessment:  [x] No SI/HI, []  passive thoughts []  suicidal ideation , []  homicidal ideation, [] self-harm Explain Risk of self-harm is  low, but could be further elevated in the event of treatment noncompliance and/or AODA.  Assessment          Psychiatric/Behavioral: Negative for agitation, behavioral problems, decreased concentration, dysphoric mood, hallucinations, self-injury, sleep disturbance, suicidal ideas, negative for hyperactivity. Positive for depressed mood, and stress. The patient is nervous/anxious.              Mental  Status Exam:    Hygiene:   good  Cooperation:  Cooperative  Eye Contact:  Good  Psychomotor Behavior:  Appropriate  Affect:  Full range and Appropriate  Hopelessness: 2  Speech:  Normal  Goal directed and Linear  Thought Content:  Normal and Mood congruent  Suicidal:  None  Homicidal:  None  Hallucinations:  None  Delusion:  None  Memory:  Intact  Orientation:  Person, Place, Time and Situation  Reliability:  good  Insight:  Good  Judgement:  Good  Impulse Control:  Good    Patient's Support Network Includes:  children and extended family    Progress toward goal: Not at goal    Functional Status: Moderate impairment     Overall: Anxious     VISIT DIAGNOSIS:     ICD-10-CM ICD-9-CM   1. MIRIAM (generalized anxiety disorder)  F41.1 300.02   2. Dysthymic disorder  F34.1 300.4        PROGNOSIS: fair if patient follows treatment recommendations    The patient appears to be mentally/physically stable compared to his baseline functioning.  However, they continues to present with a severe chronic mental illness. As a result,  they would be at significantly increased risk for decompensation and possibly higher level of care without continued treatment.  It is reasonable to assume they would considerably benefit from ongoing treatment.          PROGRESS TOWARD CURRENT PLAN OF CARE/TREATMENT PLAN:  Making Progress    SHORT-TERM GOALS: The patient will  will learn and practice at least 2 anxiety management techniques with goal of decreasing anxiety, will learn and practice at least 2 depression management techniques with goal of decreasing depression, will work with therapist to help expose and extinguish irrational beliefs and conclusions that contribute to anxiety/depression, will engage in one self-care activity daily, per self-report and will engage in one enjoyable activity daily, per self-report     LONG-TERM GOALS: With the help of therapy, I would like to:   learn how to structure my spare-time more meaningfully (jax,  etc), learn how to relax and take it easy and learn how to enjoy life and have fun  clarify or come to terms with expectations or feelings related to my partner, spouse, or significant other, learn how to be more assertive with others and set appropriate boundaries and learn how to handle other people's reactions to my behavior (criticism, rejection, praise, etc.).  come to terms with things that happened in the past and understand more clearly who I am, what I' m capable of, and what I want out of life  clarify my needs and desires and learn how to express them more effectively, figure out what my limits are and how to act accordingly, learn how to pursue my goals and plans more effectively, allow myself to experience feelings and express them more effectively and learn how to deal with strong negative feelings (e.g., anger, rage)  learn how to cope with negative thoughts, ruminations, or sense of guilt, find a way out of negative mood, sadness, or sense of inner emptiness, gain more drive and energy, learn how to master anxiety or panic attacks, learn how to reduce or cope with physical pain, learn how be more organized in daily life and learn how to handle stressful situations better    STRENGTHS: Motivated for treatment, Literate and Articulate    WEAKNESSES: Poor social support and Poor coping skills    Plan   Crisis Plan:  Symptoms and/or behaviors to indicate a crisis: Excessive worry or fear and Thinking about suicide    What calming techniques or other strategies will patient use to de-esclate and stay safe: slow down, breathe, visualize calming self, think it though, listen to music, change focus, take a walk  Who is one person patient can contact to assist with de-escalation? Naty,    Crisis Management: the Patient will contact staff or crisis line if symptoms exacerbate or if harm to self or others becomes a concern. Crisis resources include: Crisis Line [phone], 911, Local Law Enforcement, [place],  [facility] 24/7 Emergency Room [phone].    PLAN:   Will continue in BI-WEEKLY or MONTHLY ongoing outpatient treatment via Face-to-Facewith primary therapist and pharmacotherapy as scheduled.   Will  report any adverse reactions to treatment/medication interventions immediately.  Will be compliant with treatment and appointments.   September 7, 2022 11:04 EDT    Recommended Referrals: Psychiatrist/APRN  Patient will adhere to medication regimen as prescribed and report any side effects. Patient will contact this office, call 911 or present to the nearest emergency room should suicidal or homicidal ideations occur. Provide Cognitive Behavioral Therapy and Solution Focused Therapy to improve functioning, maintain stability, and avoid decompensation and the need for higher level of care.          Future Appointments       Provider Department Center    9/20/2022 11:00 AM Yumiko Alejandre LCSW [facility]    10/17/2022 1:00 PM Yumiko Alejandre LCSW [facility]    1/19/2023 2:00 PM Haim Hill MD [facility]    3/27/2023 1:30 PM Daniel Ziegler MD [facility] OBGYN [address] [place]          September 7, 2022 11:04 EDT      Yumiko Miles LCSW, [facility]            Left arm;

## 2022-09-20 NOTE — ED ADULT NURSE NOTE - DURATION
Detail Level: Simple
Price (Do Not Change): 0.00
Instructions: This plan will send the code FBSE to the PM system.  DO NOT or CHANGE the price.
today

## 2022-11-01 ENCOUNTER — EMERGENCY (EMERGENCY)
Facility: HOSPITAL | Age: 82
LOS: 1 days | Discharge: HOME | End: 2022-11-01
Attending: EMERGENCY MEDICINE | Admitting: EMERGENCY MEDICINE
Payer: MEDICARE

## 2022-11-01 VITALS
SYSTOLIC BLOOD PRESSURE: 148 MMHG | TEMPERATURE: 98 F | HEART RATE: 78 BPM | DIASTOLIC BLOOD PRESSURE: 89 MMHG | OXYGEN SATURATION: 100 % | RESPIRATION RATE: 18 BRPM

## 2022-11-01 VITALS
HEART RATE: 65 BPM | SYSTOLIC BLOOD PRESSURE: 155 MMHG | WEIGHT: 139.99 LBS | DIASTOLIC BLOOD PRESSURE: 75 MMHG | RESPIRATION RATE: 18 BRPM | TEMPERATURE: 97 F | OXYGEN SATURATION: 99 %

## 2022-11-01 DIAGNOSIS — Z90.711 ACQUIRED ABSENCE OF UTERUS WITH REMAINING CERVICAL STUMP: Chronic | ICD-10-CM

## 2022-11-01 DIAGNOSIS — Z98.49 CATARACT EXTRACTION STATUS, UNSPECIFIED EYE: Chronic | ICD-10-CM

## 2022-11-01 PROBLEM — I10 ESSENTIAL (PRIMARY) HYPERTENSION: Chronic | Status: ACTIVE | Noted: 2021-04-28

## 2022-11-01 LAB
ALBUMIN SERPL ELPH-MCNC: 3.4 G/DL — SIGNIFICANT CHANGE UP (ref 3.3–5)
ALP SERPL-CCNC: 204 U/L — HIGH (ref 40–120)
ALT FLD-CCNC: 25 U/L — SIGNIFICANT CHANGE UP (ref 12–78)
ANION GAP SERPL CALC-SCNC: 5 MMOL/L — SIGNIFICANT CHANGE UP (ref 5–17)
APPEARANCE UR: CLEAR — SIGNIFICANT CHANGE UP
AST SERPL-CCNC: 12 U/L — LOW (ref 15–37)
BASOPHILS # BLD AUTO: 0.03 K/UL — SIGNIFICANT CHANGE UP (ref 0–0.2)
BASOPHILS NFR BLD AUTO: 0.5 % — SIGNIFICANT CHANGE UP (ref 0–2)
BILIRUB SERPL-MCNC: 0.4 MG/DL — SIGNIFICANT CHANGE UP (ref 0.2–1.2)
BILIRUB UR-MCNC: NEGATIVE — SIGNIFICANT CHANGE UP
BUN SERPL-MCNC: 17 MG/DL — SIGNIFICANT CHANGE UP (ref 7–23)
CALCIUM SERPL-MCNC: 8.7 MG/DL — SIGNIFICANT CHANGE UP (ref 8.5–10.1)
CHLORIDE SERPL-SCNC: 108 MMOL/L — SIGNIFICANT CHANGE UP (ref 96–108)
CO2 SERPL-SCNC: 29 MMOL/L — SIGNIFICANT CHANGE UP (ref 22–31)
COLOR SPEC: SIGNIFICANT CHANGE UP
CREAT SERPL-MCNC: 0.72 MG/DL — SIGNIFICANT CHANGE UP (ref 0.5–1.3)
DIFF PNL FLD: NEGATIVE — SIGNIFICANT CHANGE UP
EGFR: 83 ML/MIN/1.73M2 — SIGNIFICANT CHANGE UP
EOSINOPHIL # BLD AUTO: 0.47 K/UL — SIGNIFICANT CHANGE UP (ref 0–0.5)
EOSINOPHIL NFR BLD AUTO: 7.7 % — HIGH (ref 0–6)
GLUCOSE SERPL-MCNC: 90 MG/DL — SIGNIFICANT CHANGE UP (ref 70–99)
GLUCOSE UR QL: NEGATIVE — SIGNIFICANT CHANGE UP
HCT VFR BLD CALC: 42 % — SIGNIFICANT CHANGE UP (ref 34.5–45)
HGB BLD-MCNC: 13.6 G/DL — SIGNIFICANT CHANGE UP (ref 11.5–15.5)
IMM GRANULOCYTES NFR BLD AUTO: 1.6 % — HIGH (ref 0–0.9)
KETONES UR-MCNC: NEGATIVE — SIGNIFICANT CHANGE UP
LEUKOCYTE ESTERASE UR-ACNC: NEGATIVE — SIGNIFICANT CHANGE UP
LYMPHOCYTES # BLD AUTO: 1.96 K/UL — SIGNIFICANT CHANGE UP (ref 1–3.3)
LYMPHOCYTES # BLD AUTO: 32 % — SIGNIFICANT CHANGE UP (ref 13–44)
MCHC RBC-ENTMCNC: 31.6 PG — SIGNIFICANT CHANGE UP (ref 27–34)
MCHC RBC-ENTMCNC: 32.4 GM/DL — SIGNIFICANT CHANGE UP (ref 32–36)
MCV RBC AUTO: 97.4 FL — SIGNIFICANT CHANGE UP (ref 80–100)
MONOCYTES # BLD AUTO: 0.63 K/UL — SIGNIFICANT CHANGE UP (ref 0–0.9)
MONOCYTES NFR BLD AUTO: 10.3 % — SIGNIFICANT CHANGE UP (ref 2–14)
NEUTROPHILS # BLD AUTO: 2.93 K/UL — SIGNIFICANT CHANGE UP (ref 1.8–7.4)
NEUTROPHILS NFR BLD AUTO: 47.9 % — SIGNIFICANT CHANGE UP (ref 43–77)
NITRITE UR-MCNC: NEGATIVE — SIGNIFICANT CHANGE UP
NRBC # BLD: 0 /100 WBCS — SIGNIFICANT CHANGE UP (ref 0–0)
PH UR: 7 — SIGNIFICANT CHANGE UP (ref 5–8)
PLATELET # BLD AUTO: 227 K/UL — SIGNIFICANT CHANGE UP (ref 150–400)
POTASSIUM SERPL-MCNC: 4.6 MMOL/L — SIGNIFICANT CHANGE UP (ref 3.5–5.3)
POTASSIUM SERPL-SCNC: 4.6 MMOL/L — SIGNIFICANT CHANGE UP (ref 3.5–5.3)
PROT SERPL-MCNC: 7 G/DL — SIGNIFICANT CHANGE UP (ref 6–8.3)
PROT UR-MCNC: NEGATIVE — SIGNIFICANT CHANGE UP
RBC # BLD: 4.31 M/UL — SIGNIFICANT CHANGE UP (ref 3.8–5.2)
RBC # FLD: 13 % — SIGNIFICANT CHANGE UP (ref 10.3–14.5)
SODIUM SERPL-SCNC: 142 MMOL/L — SIGNIFICANT CHANGE UP (ref 135–145)
SP GR SPEC: 1 — LOW (ref 1.01–1.02)
UROBILINOGEN FLD QL: NEGATIVE — SIGNIFICANT CHANGE UP
WBC # BLD: 6.12 K/UL — SIGNIFICANT CHANGE UP (ref 3.8–10.5)
WBC # FLD AUTO: 6.12 K/UL — SIGNIFICANT CHANGE UP (ref 3.8–10.5)

## 2022-11-01 PROCEDURE — G1004: CPT

## 2022-11-01 PROCEDURE — 96366 THER/PROPH/DIAG IV INF ADDON: CPT

## 2022-11-01 PROCEDURE — 74176 CT ABD & PELVIS W/O CONTRAST: CPT | Mod: 26,MG

## 2022-11-01 PROCEDURE — 71045 X-RAY EXAM CHEST 1 VIEW: CPT | Mod: 26

## 2022-11-01 PROCEDURE — 93005 ELECTROCARDIOGRAM TRACING: CPT

## 2022-11-01 PROCEDURE — 81003 URINALYSIS AUTO W/O SCOPE: CPT

## 2022-11-01 PROCEDURE — 72131 CT LUMBAR SPINE W/O DYE: CPT | Mod: 26,ME

## 2022-11-01 PROCEDURE — 93010 ELECTROCARDIOGRAM REPORT: CPT

## 2022-11-01 PROCEDURE — 96365 THER/PROPH/DIAG IV INF INIT: CPT

## 2022-11-01 PROCEDURE — 74176 CT ABD & PELVIS W/O CONTRAST: CPT | Mod: MG

## 2022-11-01 PROCEDURE — 99284 EMERGENCY DEPT VISIT MOD MDM: CPT | Mod: FS

## 2022-11-01 PROCEDURE — 99285 EMERGENCY DEPT VISIT HI MDM: CPT | Mod: 25

## 2022-11-01 PROCEDURE — 85025 COMPLETE CBC W/AUTO DIFF WBC: CPT

## 2022-11-01 PROCEDURE — 80053 COMPREHEN METABOLIC PANEL: CPT

## 2022-11-01 PROCEDURE — 36415 COLL VENOUS BLD VENIPUNCTURE: CPT

## 2022-11-01 PROCEDURE — 71045 X-RAY EXAM CHEST 1 VIEW: CPT

## 2022-11-01 PROCEDURE — 72131 CT LUMBAR SPINE W/O DYE: CPT | Mod: ME

## 2022-11-01 PROCEDURE — 87086 URINE CULTURE/COLONY COUNT: CPT

## 2022-11-01 RX ORDER — OXYCODONE AND ACETAMINOPHEN 5; 325 MG/1; MG/1
1 TABLET ORAL ONCE
Refills: 0 | Status: DISCONTINUED | OUTPATIENT
Start: 2022-11-01 | End: 2022-11-01

## 2022-11-01 RX ORDER — ACETAMINOPHEN 500 MG
1000 TABLET ORAL ONCE
Refills: 0 | Status: COMPLETED | OUTPATIENT
Start: 2022-11-01 | End: 2022-11-01

## 2022-11-01 RX ADMIN — Medication 1000 MILLIGRAM(S): at 18:19

## 2022-11-01 RX ADMIN — Medication 400 MILLIGRAM(S): at 15:20

## 2022-11-01 RX ADMIN — OXYCODONE AND ACETAMINOPHEN 1 TABLET(S): 5; 325 TABLET ORAL at 18:47

## 2022-11-01 NOTE — ED PROVIDER NOTE - PROGRESS NOTE DETAILS
Patient evaluated by ortho/spine, recommend pain control, donut pillow, and patient can be dced back to facility. Patient ambulating in ED without difficulty.

## 2022-11-01 NOTE — ED PROVIDER NOTE - PATIENT PORTAL LINK FT
You can access the FollowMyHealth Patient Portal offered by [facility] by registering at the following website: [email or web address]. By joining [employer]’s FollowMyHealth portal, you will also be able to view your health information using other applications (apps) compatible with our system.

## 2022-11-01 NOTE — ED PROVIDER NOTE - NS ED ATTENDING STATEMENT MOD
This was a shared visit with the ALEK. I reviewed and verified the documentation and independently performed the documented:

## 2022-11-01 NOTE — ED PROVIDER NOTE - ATTENDING APP SHARED VISIT CONTRIBUTION OF CARE
Patient sent from facility for evaluation of persistent back pain, being treated outpatient with steroids, gabapentin, tramadol, lidocaine patches. last night patient's pain worsened. no saddle anesthesia, no trouble controlling urine or stool. no trauma/fall. patient states pain now is minimal. able to ambulate.     A&Ox3, NAD. Lungs, CTA, equal and b/l, no r/r/w. S1S2, no murmurs, RRR. ABdomen soft, nt/nd. +PMSx4. no midline lumbar tenderness. mild left lumbar paraspinal tenderness. FROM legs, back.

## 2022-11-01 NOTE — ED ADULT NURSE NOTE - ALCOHOL PRE SCREEN (AUDIT - C)
CHEST 1 VIEW 5/21/2021 5:17 PM



INDICATION / CLINICAL INFORMATION: sepsis.



COMPARISON: 7/30/2019



FINDINGS:



SUPPORT DEVICES: None.

HEART / MEDIASTINUM: Stable. 

LUNGS / PLEURA: Mild scattered bibasilar linear opacities suggestive of atelectasis. There is a sligh
tly more focal opacity in the right retrocardiac space. Finding could represent developing pneumonia 
or aspiration. No pneumothorax. 



ADDITIONAL FINDINGS: No significant additional findings.



IMPRESSION:

1. Focal opacity in the right retrocardiac space which could represent aspiration or developing pneum
onia. Recommend continued follow-up.



Signer Name: Kyler Tirado MD 

Signed: 5/21/2021 6:25 PM

Workstation Name: [id number] Statement Selected

## 2022-11-01 NOTE — ED PROVIDER NOTE - CARE PROVIDER_API CALL
Ivan Olson)  Orthopaedic Surgery  [address] [place] [address], [address] [place] [address]  Phone: [phone]  Fax: [phone]  Follow Up Time: 1-3 Days

## 2022-11-01 NOTE — ED PROVIDER NOTE - OBJECTIVE STATEMENT
81 yo female with h/o dementia, htn, asthma, pe on eliquis BIBA from [facility] for back pain x 1-2 weeks. no trauma or fall. Patient had outpatient xray of lumbar and pelvis with no acute fx. Patient on prednisone taper, gabapentin and given tramadol yesterday for back pain with minimal improvement. As per nursing home papers, back pain worse last night. PAtient limited historian due to dementia.

## 2022-11-01 NOTE — CONSULT NOTE ADULT - SUBJECTIVE AND OBJECTIVE BOX
Pt Name: ANIRUDH RICHARDSON    MRN: [id number]      Patient is a 82y Female presenting to the emergency department with lower back/buttock pain x 2 weeks. Patient has a history of dementia, therefore history is somewhat limited. Per chart, patient had xrays done outpatient which were negative, but pain persisted so she was sent in for further evaluation. Patient states her pain is mostly in her buttock area and is worse when sitting down. Patient denies any numbness, tingling, bowel or bladder changes. Patient ambulates at baseline with a walker.     PAST MEDICAL & SURGICAL HISTORY:  Uncomplicated asthma, unspecified asthma severity  past history - currently stable with no medications  HTN (hypertension)  S/P cataract surgery  History of partial hysterectomy  Allergies: No Known Allergies                            13.6   6.12  )-----------( 227      ( 01 Nov 2022 13:00 )             42.0     11-01    142  |  108  |  17  ----------------------------<  90  4.6   |  29  |  0.72    Ca    8.7      01 Nov 2022 13:00    TPro  7.0  /  Alb  3.4  /  TBili  0.4  /  DBili  x   /  AST  12<L>  /  ALT  25  /  AlkPhos  204<H>  11-01      PHYSICAL EXAM:    Vital Signs Last 24 Hrs  T(C): 36.1 (01 Nov 2022 12:10), Max: 36.1 (01 Nov 2022 12:10)  T(F): 97 (01 Nov 2022 12:10), Max: 97 (01 Nov 2022 12:10)  HR: 65 (01 Nov 2022 12:10) (65 - 65)  BP: 155/75 (01 Nov 2022 12:10) (155/75 - 155/75)  BP(mean): --  RR: 18 (01 Nov 2022 12:10) (18 - 18)  SpO2: 99% (01 Nov 2022 12:10) (99% - 99%)    Parameters below as of 01 Nov 2022 12:10  Patient On (Oxygen Delivery Method): room air    Daily     Appearance: Alert, responsive, in no acute distress.  Skin: no rash on visible skin. Skin is clean, dry and intact. No bleeding. No abrasions. No ulcerations.  Neurological: Sensation is grossly intact to light touch. No focal deficits or weaknesses found.          Motor exam: [  ]          [ ] Lower extremeity          HF(l2)   KE(l3)    TA(l4)   EHL(l5)  GS(s1)                                                 R        5/5        5/5        5/5       5/5         5/5                                               L         5/5        5/5       5/5       5/5          5/5        Imaging Studies: CT lumbar spine- acute to subacute sacral insufficiency fractures     A/P:  Pt is a  82y Female with sacral insufficiency fractures    PLAN:  - No acute orthopedic surgical intervention at this time  - Pain control  - Donut pillow prn   - Treatment plan to be finalized after discussion with attending   Pt Name: ANIRUDH RICHARDSON    MRN: [id number]      Patient is a 82y Female presenting to the emergency department with lower back/buttock pain x 2 weeks. Patient has a history of dementia, therefore history is somewhat limited. Per chart, patient had xrays done outpatient which were negative, but pain persisted so she was sent in for further evaluation. Patient states her pain is mostly in her buttock area and is worse when sitting down. Patient denies any numbness, tingling, bowel or bladder changes. Patient ambulates at baseline with a walker.     PAST MEDICAL & SURGICAL HISTORY:  Uncomplicated asthma, unspecified asthma severity  past history - currently stable with no medications  HTN (hypertension)  S/P cataract surgery  History of partial hysterectomy  Allergies: No Known Allergies                            13.6   6.12  )-----------( 227      ( 01 Nov 2022 13:00 )             42.0     11-01    142  |  108  |  17  ----------------------------<  90  4.6   |  29  |  0.72    Ca    8.7      01 Nov 2022 13:00    TPro  7.0  /  Alb  3.4  /  TBili  0.4  /  DBili  x   /  AST  12<L>  /  ALT  25  /  AlkPhos  204<H>  11-01      PHYSICAL EXAM:    Vital Signs Last 24 Hrs  T(C): 36.1 (01 Nov 2022 12:10), Max: 36.1 (01 Nov 2022 12:10)  T(F): 97 (01 Nov 2022 12:10), Max: 97 (01 Nov 2022 12:10)  HR: 65 (01 Nov 2022 12:10) (65 - 65)  BP: 155/75 (01 Nov 2022 12:10) (155/75 - 155/75)  BP(mean): --  RR: 18 (01 Nov 2022 12:10) (18 - 18)  SpO2: 99% (01 Nov 2022 12:10) (99% - 99%)    Parameters below as of 01 Nov 2022 12:10  Patient On (Oxygen Delivery Method): room air    Daily     Appearance: Alert, responsive, in no acute distress.  Skin: no rash on visible skin. Skin is clean, dry and intact. No bleeding. No abrasions. No ulcerations.  Neurological: Sensation is grossly intact to light touch. No focal deficits or weaknesses found.          Motor exam: [  ]          [ ] Lower extremeity          HF(l2)   KE(l3)    TA(l4)   EHL(l5)  GS(s1)                                                 R        5/5        5/5        5/5       5/5         5/5                                               L         5/5        5/5       5/5       5/5          5/5        Imaging Studies: CT lumbar spine- acute to subacute sacral insufficiency fractures     A/P:  Pt is a  82y Female with sacral insufficiency fractures    PLAN:  - No acute orthopedic surgical intervention at this time  - Pain control  - Donut pillow prn   - Imaging and PE discussed with attending who is aware and agrees with above plan.

## 2022-11-01 NOTE — ED ADULT NURSE NOTE - OBJECTIVE STATEMENT
Pt received in bed alert and oriented and resting in bed with the c/o lower back pain and discomfort. As per Md's orders IV nicolas placed blood specimen obtained and went to the lab. Meds given and tolerated well. Nursing care ongoing safety maintained.

## 2022-11-02 LAB
CULTURE RESULTS: SIGNIFICANT CHANGE UP
SPECIMEN SOURCE: SIGNIFICANT CHANGE UP

## 2023-02-09 NOTE — PROGRESS NOTE ADULT - PROBLEM SELECTOR PLAN 7
"Anitha Will      **Please close the encounter**                "
-DVT PPx: eliquis 10 mg bid until tomorrow morning, then start eliquis 5 mg bid   -Protonix daily.
-DVT PPx: eliquis 10 mg bid   -Protonix daily.
-DVT PPx: continue with eliquis   -Protonix daily.
Pt was positive 4/26 at [facility] &  Rxed  with Regen, infusion  -Pt has been Vaccinated with 2 doses of Pfizer doses , Clean CT Chest   -DR Yen -ID, Repeat COVID PCR test for BENJAMIN d/c

## 2023-03-02 NOTE — ED ADULT NURSE NOTE - NS ED PATIENT SAFETY CONCERN
Patient viewed results in [facility]. Sent message to patient to call the office if there have any further questions.     . No

## 2023-09-23 ENCOUNTER — EMERGENCY (EMERGENCY)
Facility: HOSPITAL | Age: 83
LOS: 1 days | Discharge: NURSING, REHAB OR LONG-TERM CARE | End: 2023-09-23
Attending: EMERGENCY MEDICINE | Admitting: EMERGENCY MEDICINE
Payer: MEDICARE

## 2023-09-23 VITALS
HEIGHT: 62 IN | HEART RATE: 95 BPM | RESPIRATION RATE: 16 BRPM | WEIGHT: 143.96 LBS | SYSTOLIC BLOOD PRESSURE: 134 MMHG | DIASTOLIC BLOOD PRESSURE: 87 MMHG | OXYGEN SATURATION: 97 %

## 2023-09-23 VITALS
OXYGEN SATURATION: 97 % | RESPIRATION RATE: 16 BRPM | SYSTOLIC BLOOD PRESSURE: 136 MMHG | DIASTOLIC BLOOD PRESSURE: 78 MMHG | HEART RATE: 88 BPM

## 2023-09-23 DIAGNOSIS — Z98.49 CATARACT EXTRACTION STATUS, UNSPECIFIED EYE: Chronic | ICD-10-CM

## 2023-09-23 DIAGNOSIS — Z90.711 ACQUIRED ABSENCE OF UTERUS WITH REMAINING CERVICAL STUMP: Chronic | ICD-10-CM

## 2023-09-23 LAB
ALBUMIN SERPL ELPH-MCNC: 3.5 G/DL — SIGNIFICANT CHANGE UP (ref 3.3–5)
ALP SERPL-CCNC: 89 U/L — SIGNIFICANT CHANGE UP (ref 30–120)
ALT FLD-CCNC: 17 U/L — SIGNIFICANT CHANGE UP (ref 10–60)
ANION GAP SERPL CALC-SCNC: 9 MMOL/L — SIGNIFICANT CHANGE UP (ref 5–17)
APTT BLD: 35.2 SEC — SIGNIFICANT CHANGE UP (ref 24.5–35.6)
AST SERPL-CCNC: 30 U/L — SIGNIFICANT CHANGE UP (ref 10–40)
BASOPHILS # BLD AUTO: 0.02 K/UL — SIGNIFICANT CHANGE UP (ref 0–0.2)
BASOPHILS NFR BLD AUTO: 0.3 % — SIGNIFICANT CHANGE UP (ref 0–2)
BILIRUB SERPL-MCNC: 0.5 MG/DL — SIGNIFICANT CHANGE UP (ref 0.2–1.2)
BUN SERPL-MCNC: 21 MG/DL — SIGNIFICANT CHANGE UP (ref 7–23)
CALCIUM SERPL-MCNC: 9.5 MG/DL — SIGNIFICANT CHANGE UP (ref 8.4–10.5)
CHLORIDE SERPL-SCNC: 104 MMOL/L — SIGNIFICANT CHANGE UP (ref 96–108)
CO2 SERPL-SCNC: 25 MMOL/L — SIGNIFICANT CHANGE UP (ref 22–31)
CREAT SERPL-MCNC: 0.76 MG/DL — SIGNIFICANT CHANGE UP (ref 0.5–1.3)
EGFR: 78 ML/MIN/1.73M2 — SIGNIFICANT CHANGE UP
EOSINOPHIL # BLD AUTO: 0.41 K/UL — SIGNIFICANT CHANGE UP (ref 0–0.5)
EOSINOPHIL NFR BLD AUTO: 6.7 % — HIGH (ref 0–6)
GLUCOSE SERPL-MCNC: 108 MG/DL — HIGH (ref 70–99)
HCT VFR BLD CALC: 41.4 % — SIGNIFICANT CHANGE UP (ref 34.5–45)
HGB BLD-MCNC: 13.2 G/DL — SIGNIFICANT CHANGE UP (ref 11.5–15.5)
IMM GRANULOCYTES NFR BLD AUTO: 0.3 % — SIGNIFICANT CHANGE UP (ref 0–0.9)
INR BLD: 1.24 RATIO — HIGH (ref 0.85–1.18)
LIDOCAIN IGE QN: 43 U/L — SIGNIFICANT CHANGE UP (ref 16–77)
LYMPHOCYTES # BLD AUTO: 1.42 K/UL — SIGNIFICANT CHANGE UP (ref 1–3.3)
LYMPHOCYTES # BLD AUTO: 23.4 % — SIGNIFICANT CHANGE UP (ref 13–44)
MCHC RBC-ENTMCNC: 30.9 PG — SIGNIFICANT CHANGE UP (ref 27–34)
MCHC RBC-ENTMCNC: 31.9 GM/DL — LOW (ref 32–36)
MCV RBC AUTO: 97 FL — SIGNIFICANT CHANGE UP (ref 80–100)
MONOCYTES # BLD AUTO: 0.54 K/UL — SIGNIFICANT CHANGE UP (ref 0–0.9)
MONOCYTES NFR BLD AUTO: 8.9 % — SIGNIFICANT CHANGE UP (ref 2–14)
NEUTROPHILS # BLD AUTO: 3.67 K/UL — SIGNIFICANT CHANGE UP (ref 1.8–7.4)
NEUTROPHILS NFR BLD AUTO: 60.4 % — SIGNIFICANT CHANGE UP (ref 43–77)
NRBC # BLD: 0 /100 WBCS — SIGNIFICANT CHANGE UP (ref 0–0)
PLATELET # BLD AUTO: 170 K/UL — SIGNIFICANT CHANGE UP (ref 150–400)
POTASSIUM SERPL-MCNC: 4.5 MMOL/L — SIGNIFICANT CHANGE UP (ref 3.5–5.3)
POTASSIUM SERPL-SCNC: 4.5 MMOL/L — SIGNIFICANT CHANGE UP (ref 3.5–5.3)
PROT SERPL-MCNC: 7.1 G/DL — SIGNIFICANT CHANGE UP (ref 6–8.3)
PROTHROM AB SERPL-ACNC: 13.4 SEC — HIGH (ref 9.5–13)
RBC # BLD: 4.27 M/UL — SIGNIFICANT CHANGE UP (ref 3.8–5.2)
RBC # FLD: 13 % — SIGNIFICANT CHANGE UP (ref 10.3–14.5)
SODIUM SERPL-SCNC: 138 MMOL/L — SIGNIFICANT CHANGE UP (ref 135–145)
TROPONIN I, HIGH SENSITIVITY RESULT: 5.8 NG/L — SIGNIFICANT CHANGE UP
TROPONIN I, HIGH SENSITIVITY RESULT: 6.7 NG/L — SIGNIFICANT CHANGE UP
TROPONIN I, HIGH SENSITIVITY RESULT: 6.9 NG/L — SIGNIFICANT CHANGE UP
WBC # BLD: 6.08 K/UL — SIGNIFICANT CHANGE UP (ref 3.8–10.5)
WBC # FLD AUTO: 6.08 K/UL — SIGNIFICANT CHANGE UP (ref 3.8–10.5)

## 2023-09-23 PROCEDURE — 36415 COLL VENOUS BLD VENIPUNCTURE: CPT

## 2023-09-23 PROCEDURE — 80053 COMPREHEN METABOLIC PANEL: CPT

## 2023-09-23 PROCEDURE — 85025 COMPLETE CBC W/AUTO DIFF WBC: CPT

## 2023-09-23 PROCEDURE — 85730 THROMBOPLASTIN TIME PARTIAL: CPT

## 2023-09-23 PROCEDURE — 71045 X-RAY EXAM CHEST 1 VIEW: CPT

## 2023-09-23 PROCEDURE — 71045 X-RAY EXAM CHEST 1 VIEW: CPT | Mod: 26

## 2023-09-23 PROCEDURE — 84484 ASSAY OF TROPONIN QUANT: CPT

## 2023-09-23 PROCEDURE — 99285 EMERGENCY DEPT VISIT HI MDM: CPT | Mod: FS

## 2023-09-23 PROCEDURE — 99285 EMERGENCY DEPT VISIT HI MDM: CPT | Mod: 25

## 2023-09-23 PROCEDURE — 93005 ELECTROCARDIOGRAM TRACING: CPT

## 2023-09-23 PROCEDURE — 85610 PROTHROMBIN TIME: CPT

## 2023-09-23 PROCEDURE — 83690 ASSAY OF LIPASE: CPT

## 2023-09-23 PROCEDURE — 93010 ELECTROCARDIOGRAM REPORT: CPT

## 2023-09-23 NOTE — ED PROVIDER NOTE - PATIENT PORTAL LINK FT
You can access the FollowMyHealth Patient Portal offered by [facility] by registering at the following website: [email or web address]. By joining [employer]’s FollowMyHealth portal, you will also be able to view your health information using other applications (apps) compatible with our system.

## 2023-09-23 NOTE — ED PROVIDER NOTE - CARE PROVIDER_API CALL
Suze Cárdenas  Cardiovascular Disease  [address], [address]  [place] [id number]  Phone: [phone]  Fax: [phone]  Follow Up Time: 4-6 Days

## 2023-09-23 NOTE — CONSULT NOTE ADULT - ASSESSMENT
82y/o seen at [facility] ER. From [facility]  Patient is a poor historian, therefore history from chart  History dementia, HTN, COPD, pulmonary embolus (on Eliquis), cor pulmonale, depression, gait disturbance  S/P ALANNA  S/P cataract surgery    Seen for while at [facility] may have had some chest pain  She was given SL NTG x2 with improvement of her symptoms  Presently she claims to be asymptomatic  Troponin-6.9    Impression  ?Chest pain with some relief from SL NTG  Normal EKG    Plan:  - Continue Losartan-50mg OD  - 4/29/21 Echocardiogram with normal LV-see above  - If patient remains asymptomatic and if the cardiac enzymes are negative x3, then she may be discharged from a cardiac stand-point

## 2023-09-23 NOTE — ED ADULT NURSE NOTE - OBJECTIVE STATEMENT
pt with hx of dementia comes from [facility] for sudden onset of chestpain. pt was given nitro by EMS which relieved her pain. Patient without complaints at this time.  Patient unable to give information due to dementia

## 2023-09-23 NOTE — ED ADULT NURSE NOTE - NSFALLHARMRISKINTERV_ED_ALL_ED
Assistance OOB with selected safe patient handling equipment if applicable/Communicate risk of Fall with Harm to all staff, patient, and family/Monitor for mental status changes and reorient to person, place, and time, as needed/Move patient closer to nursing station/within visual sight of ED staff/Provide visual cue: red socks, yellow wristband, yellow gown, etc/Reinforce activity limits and safety measures with patient and family/Toileting schedule using arm’s reach rule for commode and bathroom/Use of alarms - bed, stretcher, chair and/or video monitoring/Bed in lowest position, wheels locked, appropriate side rails in place/Call bell, personal items and telephone in reach/Instruct patient to call for assistance before getting out of bed/chair/stretcher/Non-slip footwear applied when patient is off stretcher/[place] to call system/Physically safe environment - no spills, clutter or unnecessary equipment/Purposeful Proactive Rounding/Room/bathroom lighting operational, light cord in reach

## 2023-09-23 NOTE — ED PROVIDER NOTE - CLINICAL SUMMARY MEDICAL DECISION MAKING FREE TEXT BOX
83-year-old female brought in by EMS with past medical history of dementia, COPD, pulmonary embolism on Eliquis, cor pulmonale, depression, gait disturbance, seasonal allergies presents with chest pain.  Per EMS patient had episode of chest pain at nursing facility.  Patient was given 2 doses of 0.4 nitroglycerin sublingual which improved symptoms.  Patient without complaints at this time.  Patient unable to give information due to dementia.    VSS Afebrile, NAD  HEENT - clear  PERRL EOMI  Neck supple  lungs clear  Cor S1S2 RR - MGR  Abd soft nontender, no mass or HSM, no rebound  Ext FROM intact, no edema  Neuro Chronically confused, otherwise grossly Intact, no focal deficits.  Skin Warm and dry no rash.  Imp - Nonspecific chest pain. RO Cardiac etiogy.  Plan - labs, trop, ekg, cxr. May need cardio eval vs outpt follow up.

## 2023-09-23 NOTE — ED PROVIDER NOTE - NSICDXPASTMEDICALHX_GEN_ALL_CORE_FT
PAST MEDICAL HISTORY:  Dementia     HTN (hypertension)     Pulmonary embolism     Uncomplicated asthma, unspecified asthma severity past history - currently stable with no medications

## 2023-09-23 NOTE — ED PROVIDER NOTE - OBJECTIVE STATEMENT
Patient is an 83-year-old female brought in by EMS with past medical history of dementia, COPD, pulmonary embolism on Eliquis, cor pulmonale, depression, gait disturbance, seasonal allergies presents with chest pain.  Per EMS patient had episode of chest pain at nursing facility.  Patient was given 2 doses of 0.4 nitroglycerin sublingual which improved symptoms.  Patient without complaints at this time.  Patient unable to give information due to dementia.

## 2023-09-23 NOTE — CONSULT NOTE ADULT - SUBJECTIVE AND OBJECTIVE BOX
CARDIOLOGY CONSULT NOTE    Patient is a 83y Female with a known history of :    HPI:      REVIEW OF SYSTEMS:    CONSTITUTIONAL: No fever, weight loss, or fatigue  EYES: No eye pain, visual disturbances, or discharge  ENMT:  No difficulty hearing, tinnitus, vertigo; No sinus or throat pain  NECK: No pain or stiffness  BREASTS: No pain, masses, or nipple discharge  RESPIRATORY: No cough, wheezing, chills or hemoptysis; No shortness of breath  CARDIOVASCULAR: No chest pain, palpitations, dizziness, or leg swelling  GASTROINTESTINAL: No abdominal or epigastric pain. No nausea, vomiting, or hematemesis; No diarrhea or constipation. No melena or hematochezia.  GENITOURINARY: No dysuria, frequency, hematuria, or incontinence  NEUROLOGICAL: No headaches, memory loss, loss of strength, numbness, or tremors  SKIN: No itching, burning, rashes, or lesions   LYMPH NODES: No enlarged glands  ENDOCRINE: No heat or cold intolerance; No hair loss  MUSCULOSKELETAL: No joint pain or swelling; No muscle, back, or extremity pain  PSYCHIATRIC: No depression, anxiety, mood swings, or difficulty sleeping  HEME/LYMPH: No easy bruising, or bleeding gums  ALLERGY AND IMMUNOLOGIC: No hives or eczema    MEDICATIONS  (STANDING):    MEDICATIONS  (PRN):      ALLERGIES: No Known Allergies      FAMILY HISTORY:      Social History:  Alochol:   Smoking:   Drug Use:   Marital Status:     I&O's Detail      PHYSICAL EXAMINATION:  -----------------------------  T(C): --  HR: 95 (09-23-23 @ 11:48) (95 - 95)  BP: 134/87 (09-23-23 @ 11:48) (134/87 - 134/87)  RR: 16 (09-23-23 @ 11:48) (16 - 16)  SpO2: 97% (09-23-23 @ 11:48) (97% - 97%)  Wt(kg): --    Height (cm): 157.5 (09-23 @ 11:48)  Weight (kg): 65.3 (09-23 @ 11:48)  BMI (kg/m2): 26.3 (09-23 @ 11:48)  BSA (m2): 1.66 (09-23 @ 11:48)    Constitutional: well developed, normal appearance, well groomed, well nourished, no deformities and no acute distress.   Eyes: the conjunctiva exhibited no abnormalities and the eyelids demonstrated no xanthelasmas.   HEENT: normal oral mucosa, no oral pallor and no oral cyanosis.   Neck: normal jugular venous A waves present, normal jugular venous V waves present and no jugular venous kelly A waves.   Pulmonary: no respiratory distress, normal respiratory rhythm and effort, no accessory muscle use and lungs were clear to auscultation bilaterally.   Cardiovascular: heart rate and rhythm were normal, normal S1 and S2 and no murmur, gallop, rub, heave or thrill are present.   Musculoskeletal: the gait could not be assessed.   Extremities: no clubbing of the fingernails, no localized cyanosis, no petechial hemorrhages and no ischemic changes.   Skin: normal skin color and pigmentation, no rash, no venous stasis, no skin lesions, no skin ulcer and no xanthoma was observed.       LABS:   --------  09-23    138  |  104  |  21  ----------------------------<  108<H>  4.5   |  25  |  0.76    Ca    9.5      23 Sep 2023 12:02    TPro  7.1  /  Alb  3.5  /  TBili  0.5  /  DBili  x   /  AST  30  /  ALT  17  /  AlkPhos  89  09-23                         13.2   6.08  )-----------( 170      ( 23 Sep 2023 12:02 )             41.4     PT/INR - ( 23 Sep 2023 12:02 )   PT: 13.4 sec;   INR: 1.24 ratio         PTT - ( 23 Sep 2023 12:02 )  PTT:35.2 sec              RADIOLOGY:  -----------------    < from: TTE Echo Complete w/o Contrast w/ Doppler (04.29.21 @ 10:09) >     EXAM:  ECHO TTE WO CON COMP W DOPP         PROCEDURE DATE:  04/29/2021        INTERPRETATION:  INDICATION: Dyspnea  Sonographer PH    Blood Pressure 162/65    Height 152 cm     Weight 47 kg       BSA 1.4 sq m    Dimensions:  LA 2.4       Normal Values: 2.0 - 4.0 cm  Ao 3.1        Normal Values: 2.0 - 3.8 cm  SEPTUM 1.2       Normal Values: 0.6 - 1.2 cm  PWT 1.1       Normal Values: 0.6 - 1.1 cm  LVIDd 4.0         Normal Values: 3.0 - 5.6 cm  LVIDs 2.5         Normal Values: 1.8 - 4.0 cm      OBSERVATIONS:  Mitral Valve: Mild MR.  Aortic Valve/Aorta: Sclerotic trileaflet aortic valve with normal opening. Trace AI  Tricuspid Valve: Mild TR.  Pulmonic Valve: Not well-visualized  Left Atrium: normal  Right Atrium: Not well-visualized  Left Ventricle: normal LV size and systolic function, estimated LVEF of 60%.  Right Ventricle: Grossly normal size and systolic function.  Pericardium: no significant pericardial effusion.  Pulmonary/RV Pressure: estimated PA systolic pressure of 26 mmHg        IMPRESSION:  Normal left ventricular internal dimensions and systolic function, estimated LVEF of 60%.  Grossly normal RV size and systolic function.  Sclerotic trileaflet aortic valve, trace AI.  Mild MR and TR.  No significant pericardial effusion.              RENETTA SCHULZ MD; Attending Cardiologist  This document has been electronically signed. Apr 30 2021 11:24AM    < end of copied text >      ECG: NSR, no acute changes

## 2023-09-23 NOTE — ED PROVIDER NOTE - PROGRESS NOTE DETAILS
pt improved. pt seen by cardio dr connie chavez appreciated. trop neg x 3 advised cardio follow up. All imaging and labs reviewed. all results reviewed with pt including abnormal results. pt given a copy of results. pt advised to follow up with pmd regarding abnormal results. All questions answered and concerns addressed. pt verbalized understanding and agreement with plan and dx. pt advised on next step and when/where to follow up. pt advised on all take home and otc medications. pt advised to follow up with PMD. pt advised to return to ed for worsenng symptoms including fever, cp, sob. will dc.

## 2023-09-23 NOTE — ED ADULT TRIAGE NOTE - CHIEF COMPLAINT QUOTE
As per EMS " she have left sided chest pain - the staff gave her nitro SL x 2 ( 1st dose 10:40 2nd dose 10:45 ) - Pt reported that she pain free after the nitro

## 2023-09-23 NOTE — ED CLERICAL - CLERICAL COMMENTS
called for transport [facility] at 1705[facility]  [facility]  with [facility].

## 2023-09-23 NOTE — ED ADULT NURSE REASSESSMENT NOTE - NS ED NURSE REASSESS COMMENT FT1
pt repeatedly getting up from bed unassisted when assistance is needed, pt is fall risk and placed closer to nurses station with bed alarm in place. pt also pulled out own IV. Md aware

## 2023-11-26 NOTE — ED PROVIDER NOTE - INTERPRETATION
Patient with c/o sore throat and vomiting since Thursday. Patient also with c/o mid to lower back pain starting today. Patient denies injury.    normal sinus rhythm

## 2024-02-23 NOTE — H&P ADULT. - PMH
MD Sosa to bedside.  Per MD Sosa, patient placed from Trendelenberg position to sitting up and given drink.  Provider [place] made aware.   Uncomplicated asthma, unspecified asthma severity

## 2024-04-08 ENCOUNTER — EMERGENCY (EMERGENCY)
Facility: HOSPITAL | Age: 84
LOS: 1 days | Discharge: NURSING, REHAB OR LONG-TERM CARE | End: 2024-04-08
Attending: EMERGENCY MEDICINE | Admitting: EMERGENCY MEDICINE
Payer: MEDICARE

## 2024-04-08 VITALS — RESPIRATION RATE: 20 BRPM | TEMPERATURE: 98 F | OXYGEN SATURATION: 96 % | HEART RATE: 76 BPM

## 2024-04-08 VITALS
DIASTOLIC BLOOD PRESSURE: 75 MMHG | OXYGEN SATURATION: 97 % | WEIGHT: 154.98 LBS | RESPIRATION RATE: 15 BRPM | SYSTOLIC BLOOD PRESSURE: 152 MMHG | HEART RATE: 84 BPM | HEIGHT: 62 IN | TEMPERATURE: 98 F

## 2024-04-08 DIAGNOSIS — Z98.49 CATARACT EXTRACTION STATUS, UNSPECIFIED EYE: Chronic | ICD-10-CM

## 2024-04-08 DIAGNOSIS — Z90.711 ACQUIRED ABSENCE OF UTERUS WITH REMAINING CERVICAL STUMP: Chronic | ICD-10-CM

## 2024-04-08 PROBLEM — I26.99 OTHER PULMONARY EMBOLISM WITHOUT ACUTE COR PULMONALE: Chronic | Status: ACTIVE | Noted: 2023-09-23

## 2024-04-08 PROBLEM — F03.90 UNSPECIFIED DEMENTIA WITHOUT BEHAVIORAL DISTURBANCE: Chronic | Status: ACTIVE | Noted: 2023-09-23

## 2024-04-08 LAB
ALBUMIN SERPL ELPH-MCNC: 3.3 G/DL — SIGNIFICANT CHANGE UP (ref 3.3–5)
ALP SERPL-CCNC: 95 U/L — SIGNIFICANT CHANGE UP (ref 30–120)
ALT FLD-CCNC: 15 U/L — SIGNIFICANT CHANGE UP (ref 10–60)
ANION GAP SERPL CALC-SCNC: 7 MMOL/L — SIGNIFICANT CHANGE UP (ref 5–17)
APTT BLD: 33.3 SEC — SIGNIFICANT CHANGE UP (ref 24.5–35.6)
AST SERPL-CCNC: 35 U/L — SIGNIFICANT CHANGE UP (ref 10–40)
BASOPHILS # BLD AUTO: 0.02 K/UL — SIGNIFICANT CHANGE UP (ref 0–0.2)
BASOPHILS NFR BLD AUTO: 0.3 % — SIGNIFICANT CHANGE UP (ref 0–2)
BILIRUB SERPL-MCNC: 0.4 MG/DL — SIGNIFICANT CHANGE UP (ref 0.2–1.2)
BUN SERPL-MCNC: 21 MG/DL — SIGNIFICANT CHANGE UP (ref 7–23)
CALCIUM SERPL-MCNC: 9.1 MG/DL — SIGNIFICANT CHANGE UP (ref 8.4–10.5)
CHLORIDE SERPL-SCNC: 106 MMOL/L — SIGNIFICANT CHANGE UP (ref 96–108)
CO2 SERPL-SCNC: 27 MMOL/L — SIGNIFICANT CHANGE UP (ref 22–31)
CREAT SERPL-MCNC: 0.71 MG/DL — SIGNIFICANT CHANGE UP (ref 0.5–1.3)
EGFR: 84 ML/MIN/1.73M2 — SIGNIFICANT CHANGE UP
EOSINOPHIL # BLD AUTO: 0.46 K/UL — SIGNIFICANT CHANGE UP (ref 0–0.5)
EOSINOPHIL NFR BLD AUTO: 6.8 % — HIGH (ref 0–6)
GLUCOSE SERPL-MCNC: 96 MG/DL — SIGNIFICANT CHANGE UP (ref 70–99)
HCT VFR BLD CALC: 40.6 % — SIGNIFICANT CHANGE UP (ref 34.5–45)
HGB BLD-MCNC: 13.3 G/DL — SIGNIFICANT CHANGE UP (ref 11.5–15.5)
IMM GRANULOCYTES NFR BLD AUTO: 0.4 % — SIGNIFICANT CHANGE UP (ref 0–0.9)
INR BLD: 1.19 RATIO — HIGH (ref 0.85–1.18)
LYMPHOCYTES # BLD AUTO: 1.63 K/UL — SIGNIFICANT CHANGE UP (ref 1–3.3)
LYMPHOCYTES # BLD AUTO: 24 % — SIGNIFICANT CHANGE UP (ref 13–44)
MCHC RBC-ENTMCNC: 31.2 PG — SIGNIFICANT CHANGE UP (ref 27–34)
MCHC RBC-ENTMCNC: 32.8 GM/DL — SIGNIFICANT CHANGE UP (ref 32–36)
MCV RBC AUTO: 95.3 FL — SIGNIFICANT CHANGE UP (ref 80–100)
MONOCYTES # BLD AUTO: 0.58 K/UL — SIGNIFICANT CHANGE UP (ref 0–0.9)
MONOCYTES NFR BLD AUTO: 8.5 % — SIGNIFICANT CHANGE UP (ref 2–14)
NEUTROPHILS # BLD AUTO: 4.08 K/UL — SIGNIFICANT CHANGE UP (ref 1.8–7.4)
NEUTROPHILS NFR BLD AUTO: 60 % — SIGNIFICANT CHANGE UP (ref 43–77)
NRBC # BLD: 0 /100 WBCS — SIGNIFICANT CHANGE UP (ref 0–0)
PLATELET # BLD AUTO: 153 K/UL — SIGNIFICANT CHANGE UP (ref 150–400)
POTASSIUM SERPL-MCNC: 5.1 MMOL/L — SIGNIFICANT CHANGE UP (ref 3.5–5.3)
POTASSIUM SERPL-SCNC: 5.1 MMOL/L — SIGNIFICANT CHANGE UP (ref 3.5–5.3)
PROT SERPL-MCNC: 6.9 G/DL — SIGNIFICANT CHANGE UP (ref 6–8.3)
PROTHROM AB SERPL-ACNC: 13.3 SEC — HIGH (ref 9.5–13)
RBC # BLD: 4.26 M/UL — SIGNIFICANT CHANGE UP (ref 3.8–5.2)
RBC # FLD: 13.4 % — SIGNIFICANT CHANGE UP (ref 10.3–14.5)
SODIUM SERPL-SCNC: 140 MMOL/L — SIGNIFICANT CHANGE UP (ref 135–145)
TROPONIN I, HIGH SENSITIVITY RESULT: 4.6 NG/L — SIGNIFICANT CHANGE UP
TROPONIN I, HIGH SENSITIVITY RESULT: 5.6 NG/L — SIGNIFICANT CHANGE UP
WBC # BLD: 6.8 K/UL — SIGNIFICANT CHANGE UP (ref 3.8–10.5)
WBC # FLD AUTO: 6.8 K/UL — SIGNIFICANT CHANGE UP (ref 3.8–10.5)

## 2024-04-08 PROCEDURE — 85025 COMPLETE CBC W/AUTO DIFF WBC: CPT

## 2024-04-08 PROCEDURE — 85610 PROTHROMBIN TIME: CPT

## 2024-04-08 PROCEDURE — 93005 ELECTROCARDIOGRAM TRACING: CPT

## 2024-04-08 PROCEDURE — 84484 ASSAY OF TROPONIN QUANT: CPT

## 2024-04-08 PROCEDURE — 71045 X-RAY EXAM CHEST 1 VIEW: CPT

## 2024-04-08 PROCEDURE — 99285 EMERGENCY DEPT VISIT HI MDM: CPT

## 2024-04-08 PROCEDURE — 71045 X-RAY EXAM CHEST 1 VIEW: CPT | Mod: 26

## 2024-04-08 PROCEDURE — 80053 COMPREHEN METABOLIC PANEL: CPT

## 2024-04-08 PROCEDURE — 85730 THROMBOPLASTIN TIME PARTIAL: CPT

## 2024-04-08 PROCEDURE — 36415 COLL VENOUS BLD VENIPUNCTURE: CPT

## 2024-04-08 PROCEDURE — 93010 ELECTROCARDIOGRAM REPORT: CPT

## 2024-04-08 PROCEDURE — 99285 EMERGENCY DEPT VISIT HI MDM: CPT | Mod: 25

## 2024-04-08 NOTE — ED PROVIDER NOTE - CARE PROVIDER_API CALL
Preet Mcallister  Internal Medicine  [address] [place] [address]  Phone: [phone]  Fax: [phone]  Follow Up Time: 1-3 Days

## 2024-04-08 NOTE — ED PROVIDER NOTE - PROGRESS NOTE DETAILS
Reevaluated patient at bedside.  Patient feeling well, no pain.  Called son, discussed the results of all diagnostic testing in ED and copies of all reports given.   An opportunity to ask questions was given.  Discussed the importance of prompt, close medical follow-up.  Patient will return with any changes, concerns or persistent / worsening symptoms.  Understanding of all instructions verbalized.

## 2024-04-08 NOTE — ED ADULT NURSE NOTE - NSFALLHARMRISKINTERV_ED_ALL_ED
Assistance OOB with selected safe patient handling equipment if applicable/Assistance with ambulation/Communicate risk of Fall with Harm to all staff, patient, and family/Monitor gait and stability/Monitor for mental status changes and reorient to person, place, and time, as needed/Provide visual cue: red socks, yellow wristband, yellow gown, etc/Reinforce activity limits and safety measures with patient and family/Toileting schedule using arm’s reach rule for commode and bathroom/Use of alarms - bed, stretcher, chair and/or video monitoring/Bed in lowest position, wheels locked, appropriate side rails in place/Call bell, personal items and telephone in reach/Instruct patient to call for assistance before getting out of bed/chair/stretcher/Non-slip footwear applied when patient is off stretcher/[place] to call system/Physically safe environment - no spills, clutter or unnecessary equipment/Purposeful Proactive Rounding/Room/bathroom lighting operational, light cord in reach

## 2024-04-08 NOTE — ED PROVIDER NOTE - PATIENT PORTAL LINK FT
You can access the FollowMyHealth Patient Portal offered by [facility] by registering at the following website: [email or web address]. By joining [employer]’s FollowMyHealth portal, you will also be able to view your health information using other applications (apps) compatible with our system.

## 2024-04-08 NOTE — ED PROVIDER NOTE - CLINICAL SUMMARY MEDICAL DECISION MAKING FREE TEXT BOX
Patient brought in by EMS from [facility] for complaints of chest pain.  Per EMS report patient had complained of chest pain at her nursing home so they called EMS.  Patient poor historian secondary to dementia unsure why in ER but denies any current pain.  PMD Esvin    Plan EKG chest x-ray labs    Differential including but not limited to MI ACS pneumothorax effusion pneumonia

## 2024-04-08 NOTE — ED ADULT NURSE REASSESSMENT NOTE - NS ED NURSE REASSESS COMMENT FT1
Pt confused to situation, denies any CP at this time, is awaiting ambulance for transport back to facility.

## 2024-04-08 NOTE — ED PROVIDER NOTE - DIFFERENTIAL DIAGNOSIS
Differential including but not limited to MI ACS pneumothorax effusion pneumonia Differential Diagnosis

## 2024-04-08 NOTE — ED PROVIDER NOTE - OBJECTIVE STATEMENT
Patient brought in by EMS from [facility] for complaints of chest pain.  Per EMS report patient had complained of chest pain at her nursing home so they called EMS.  Patient poor historian secondary to dementia unsure why in ER but denies any current pain.  ANIKA Mcallister

## 2024-04-08 NOTE — ED ADULT NURSE NOTE - OBJECTIVE STATEMENT
Pt BIBA from [facility] for chest pain.  Pt is found to be confused and becomes agitated very easily. Placed on cardiac monitor upon arrival and labs sent.

## 2024-04-08 NOTE — ED ADULT NURSE REASSESSMENT NOTE - NSFALLHARMRISKINTERV_ED_ALL_ED
Assistance OOB with selected safe patient handling equipment if applicable/Assistance with ambulation/Communicate risk of Fall with Harm to all staff, patient, and family/Monitor gait and stability/Monitor for mental status changes and reorient to person, place, and time, as needed/Move patient closer to nursing station/within visual sight of ED staff/Provide patient with walking aids/Provide visual cue: red socks, yellow wristband, yellow gown, etc/Reinforce activity limits and safety measures with patient and family/Toileting schedule using arm’s reach rule for commode and bathroom/Use of alarms - bed, stretcher, chair and/or video monitoring/Bed in lowest position, wheels locked, appropriate side rails in place/Call bell, personal items and telephone in reach/Instruct patient to call for assistance before getting out of bed/chair/stretcher/Non-slip footwear applied when patient is off stretcher/[place] to call system/Physically safe environment - no spills, clutter or unnecessary equipment/Purposeful Proactive Rounding/Room/bathroom lighting operational, light cord in reach

## 2024-05-18 ENCOUNTER — EMERGENCY (EMERGENCY)
Facility: HOSPITAL | Age: 84
LOS: 1 days | Discharge: NURSING, REHAB OR LONG-TERM CARE | End: 2024-05-18
Attending: EMERGENCY MEDICINE | Admitting: EMERGENCY MEDICINE
Payer: MEDICARE

## 2024-05-18 VITALS
HEIGHT: 62 IN | TEMPERATURE: 98 F | OXYGEN SATURATION: 95 % | RESPIRATION RATE: 16 BRPM | WEIGHT: 151.9 LBS | SYSTOLIC BLOOD PRESSURE: 114 MMHG | DIASTOLIC BLOOD PRESSURE: 69 MMHG | HEART RATE: 69 BPM

## 2024-05-18 DIAGNOSIS — Z90.711 ACQUIRED ABSENCE OF UTERUS WITH REMAINING CERVICAL STUMP: Chronic | ICD-10-CM

## 2024-05-18 DIAGNOSIS — Z98.49 CATARACT EXTRACTION STATUS, UNSPECIFIED EYE: Chronic | ICD-10-CM

## 2024-05-18 LAB
ALBUMIN SERPL ELPH-MCNC: 3.7 G/DL — SIGNIFICANT CHANGE UP (ref 3.3–5)
ALP SERPL-CCNC: 86 U/L — SIGNIFICANT CHANGE UP (ref 30–120)
ALT FLD-CCNC: 14 U/L — SIGNIFICANT CHANGE UP (ref 10–60)
ANION GAP SERPL CALC-SCNC: 9 MMOL/L — SIGNIFICANT CHANGE UP (ref 5–17)
AST SERPL-CCNC: 11 U/L — SIGNIFICANT CHANGE UP (ref 10–40)
BASOPHILS # BLD AUTO: 0.04 K/UL — SIGNIFICANT CHANGE UP (ref 0–0.2)
BASOPHILS NFR BLD AUTO: 0.6 % — SIGNIFICANT CHANGE UP (ref 0–2)
BILIRUB SERPL-MCNC: 0.4 MG/DL — SIGNIFICANT CHANGE UP (ref 0.2–1.2)
BUN SERPL-MCNC: 26 MG/DL — HIGH (ref 7–23)
CALCIUM SERPL-MCNC: 9.3 MG/DL — SIGNIFICANT CHANGE UP (ref 8.4–10.5)
CHLORIDE SERPL-SCNC: 104 MMOL/L — SIGNIFICANT CHANGE UP (ref 96–108)
CO2 SERPL-SCNC: 28 MMOL/L — SIGNIFICANT CHANGE UP (ref 22–31)
CREAT SERPL-MCNC: 1.03 MG/DL — SIGNIFICANT CHANGE UP (ref 0.5–1.3)
EGFR: 54 ML/MIN/1.73M2 — LOW
EOSINOPHIL # BLD AUTO: 0.57 K/UL — HIGH (ref 0–0.5)
EOSINOPHIL NFR BLD AUTO: 8 % — HIGH (ref 0–6)
GLUCOSE SERPL-MCNC: 102 MG/DL — HIGH (ref 70–99)
HCT VFR BLD CALC: 39.8 % — SIGNIFICANT CHANGE UP (ref 34.5–45)
HGB BLD-MCNC: 12.8 G/DL — SIGNIFICANT CHANGE UP (ref 11.5–15.5)
IMM GRANULOCYTES NFR BLD AUTO: 0.3 % — SIGNIFICANT CHANGE UP (ref 0–0.9)
LYMPHOCYTES # BLD AUTO: 1.6 K/UL — SIGNIFICANT CHANGE UP (ref 1–3.3)
LYMPHOCYTES # BLD AUTO: 22.5 % — SIGNIFICANT CHANGE UP (ref 13–44)
MAGNESIUM SERPL-MCNC: 2 MG/DL — SIGNIFICANT CHANGE UP (ref 1.6–2.6)
MCHC RBC-ENTMCNC: 30.6 PG — SIGNIFICANT CHANGE UP (ref 27–34)
MCHC RBC-ENTMCNC: 32.2 GM/DL — SIGNIFICANT CHANGE UP (ref 32–36)
MCV RBC AUTO: 95.2 FL — SIGNIFICANT CHANGE UP (ref 80–100)
MONOCYTES # BLD AUTO: 0.62 K/UL — SIGNIFICANT CHANGE UP (ref 0–0.9)
MONOCYTES NFR BLD AUTO: 8.7 % — SIGNIFICANT CHANGE UP (ref 2–14)
NEUTROPHILS # BLD AUTO: 4.27 K/UL — SIGNIFICANT CHANGE UP (ref 1.8–7.4)
NEUTROPHILS NFR BLD AUTO: 59.9 % — SIGNIFICANT CHANGE UP (ref 43–77)
NRBC # BLD: 0 /100 WBCS — SIGNIFICANT CHANGE UP (ref 0–0)
PLATELET # BLD AUTO: 162 K/UL — SIGNIFICANT CHANGE UP (ref 150–400)
POTASSIUM SERPL-MCNC: 4 MMOL/L — SIGNIFICANT CHANGE UP (ref 3.5–5.3)
POTASSIUM SERPL-SCNC: 4 MMOL/L — SIGNIFICANT CHANGE UP (ref 3.5–5.3)
PROT SERPL-MCNC: 6.6 G/DL — SIGNIFICANT CHANGE UP (ref 6–8.3)
RBC # BLD: 4.18 M/UL — SIGNIFICANT CHANGE UP (ref 3.8–5.2)
RBC # FLD: 13.3 % — SIGNIFICANT CHANGE UP (ref 10.3–14.5)
SODIUM SERPL-SCNC: 141 MMOL/L — SIGNIFICANT CHANGE UP (ref 135–145)
TROPONIN I, HIGH SENSITIVITY RESULT: 4.6 NG/L — SIGNIFICANT CHANGE UP
WBC # BLD: 7.12 K/UL — SIGNIFICANT CHANGE UP (ref 3.8–10.5)
WBC # FLD AUTO: 7.12 K/UL — SIGNIFICANT CHANGE UP (ref 3.8–10.5)

## 2024-05-18 PROCEDURE — 71045 X-RAY EXAM CHEST 1 VIEW: CPT | Mod: 26

## 2024-05-18 PROCEDURE — 93010 ELECTROCARDIOGRAM REPORT: CPT

## 2024-05-18 PROCEDURE — 99285 EMERGENCY DEPT VISIT HI MDM: CPT

## 2024-05-18 PROCEDURE — 70450 CT HEAD/BRAIN W/O DYE: CPT | Mod: 26,MC

## 2024-05-18 RX ORDER — UMECLIDINIUM 62.5 UG/1
1 AEROSOL, POWDER ORAL
Refills: 0 | DISCHARGE

## 2024-05-18 RX ORDER — NITROGLYCERIN 6.5 MG
1 CAPSULE, EXTENDED RELEASE ORAL
Refills: 0 | DISCHARGE

## 2024-05-18 RX ORDER — APIXABAN 2.5 MG/1
1 TABLET, FILM COATED ORAL
Refills: 0 | DISCHARGE

## 2024-05-18 RX ORDER — LIDOCAINE 4 G/100G
1 CREAM TOPICAL
Refills: 0 | DISCHARGE

## 2024-05-18 RX ORDER — LOSARTAN POTASSIUM 100 MG/1
1 TABLET, FILM COATED ORAL
Qty: 0 | Refills: 0 | DISCHARGE

## 2024-05-18 RX ORDER — SODIUM CHLORIDE 9 MG/ML
1000 INJECTION INTRAMUSCULAR; INTRAVENOUS; SUBCUTANEOUS
Refills: 0 | Status: DISCONTINUED | OUTPATIENT
Start: 2024-05-18 | End: 2024-05-22

## 2024-05-18 RX ORDER — GABAPENTIN 400 MG/1
1 CAPSULE ORAL
Refills: 0 | DISCHARGE

## 2024-05-18 RX ORDER — ESCITALOPRAM OXALATE 10 MG/1
2 TABLET, FILM COATED ORAL
Refills: 0 | DISCHARGE

## 2024-05-18 RX ADMIN — SODIUM CHLORIDE 1000 MILLILITER(S): 9 INJECTION INTRAMUSCULAR; INTRAVENOUS; SUBCUTANEOUS at 23:24

## 2024-05-18 RX ADMIN — SODIUM CHLORIDE 100 MILLILITER(S): 9 INJECTION INTRAMUSCULAR; INTRAVENOUS; SUBCUTANEOUS at 22:01

## 2024-05-18 NOTE — ED PROVIDER NOTE - CARE PROVIDER_API CALL
Preet Mcallister  Internal Medicine  [address] [place] [address]  Phone: [phone]  Fax: [phone]  Follow Up Time: 1-3 Days

## 2024-05-18 NOTE — ED ADULT NURSE NOTE - FINAL NURSING ELECTRONIC SIGNATURE
Will have you try imodium, take according to package instructions    Will have you get labs    Will add abd ultrasound  
19-May-2024 00:57

## 2024-05-18 NOTE — ED PROVIDER NOTE - NSDCPRINTRESULTS_ED_ALL_ED
Patient requests all Lab, Cardiology, and Radiology Results on their Discharge Instructions Seroquel 25-50mg p.o. Q6H PRN

## 2024-05-18 NOTE — ED PROVIDER NOTE - OBJECTIVE STATEMENT
Orders patient brought by EMS from nursing home after report of syncopal episode.  As per EMS, patient was dancing and then slumped to the floor.  Patient does not recall event and is unable to contribute to the history but states she now feels fine.  Patient was noted to be hypotensive at time of incident by nursing home staff.  EMS states they found patient in bed.  No injuries were reported as patient did not fall hard to the floor.  Patient denies any pain.  No chest pain.  No shortness of breath.  No headache.  No neck pain.  Patient denies recent illness.  No fevers or vomiting.  No diarrhea.  Patient states she has been eating very well.

## 2024-05-18 NOTE — ED ADULT NURSE NOTE - CHIEF COMPLAINT QUOTE
PT BIB EMS from [facility] c/o syncope; pt was dancing and had a syncopal episode; pt back at baseline; no injuries; on Eliquis; pt was received by EMS in bed at baseline;

## 2024-05-18 NOTE — ED PROVIDER NOTE - PATIENT PORTAL LINK FT
You can access the FollowMyHealth Patient Portal offered by [facility] by registering at the following website: [email or web address]. By joining [facility]’s FollowMyHealth portal, you will also be able to view your health information using other applications (apps) compatible with our system.

## 2024-05-18 NOTE — ED ADULT NURSE NOTE - NSFALLHARMRISKINTERV_ED_ALL_ED
Assistance OOB with selected safe patient handling equipment if applicable/Assistance with ambulation/Communicate risk of Fall with Harm to all staff, patient, and family/Monitor gait and stability/Monitor for mental status changes and reorient to person, place, and time, as needed/Provide visual cue: red socks, yellow wristband, yellow gown, etc/Reinforce activity limits and safety measures with patient and family/Toileting schedule using arm’s reach rule for commode and bathroom/Use of alarms - bed, stretcher, chair and/or video monitoring/Bed in lowest position, wheels locked, appropriate side rails in place/Call bell, personal items and telephone in reach/Instruct patient to call for assistance before getting out of bed/chair/stretcher/Non-slip footwear applied when patient is off stretcher/[place] to call system/Physically safe environment - no spills, clutter or unnecessary equipment/Purposeful Proactive Rounding/Room/bathroom lighting operational, light cord in reach

## 2024-05-18 NOTE — ED ADULT TRIAGE NOTE - CHIEF COMPLAINT QUOTE
PT BIB EMS from [facility] c/o syncope; pt was dancing and had a syncopal episode; pt back at baseline; no injuries; on Eliquis; pt was received by EMS in bed at baseline;

## 2024-05-18 NOTE — ED PROVIDER NOTE - CLINICAL SUMMARY MEDICAL DECISION MAKING FREE TEXT BOX
Patient with reported episode of syncope while dancing.  No other symptoms reported and patient reports feeling well.  Patient has unremarkable exam.  Will get EKG and labs.  Will get head CT.  Patient will require serial troponins.  If no acute findings patient can continue observation back at skilled nursing facility.

## 2024-05-19 VITALS
HEART RATE: 68 BPM | SYSTOLIC BLOOD PRESSURE: 117 MMHG | TEMPERATURE: 98 F | DIASTOLIC BLOOD PRESSURE: 66 MMHG | OXYGEN SATURATION: 98 % | RESPIRATION RATE: 18 BRPM

## 2024-05-19 LAB — TROPONIN I, HIGH SENSITIVITY RESULT: 4.3 NG/L — SIGNIFICANT CHANGE UP

## 2024-05-19 PROCEDURE — 83735 ASSAY OF MAGNESIUM: CPT

## 2024-05-19 PROCEDURE — 70450 CT HEAD/BRAIN W/O DYE: CPT | Mod: MC

## 2024-05-19 PROCEDURE — 71045 X-RAY EXAM CHEST 1 VIEW: CPT

## 2024-05-19 PROCEDURE — 93005 ELECTROCARDIOGRAM TRACING: CPT

## 2024-05-19 PROCEDURE — 85025 COMPLETE CBC W/AUTO DIFF WBC: CPT

## 2024-05-19 PROCEDURE — 99285 EMERGENCY DEPT VISIT HI MDM: CPT | Mod: 25

## 2024-05-19 PROCEDURE — 80053 COMPREHEN METABOLIC PANEL: CPT

## 2024-05-19 PROCEDURE — 36415 COLL VENOUS BLD VENIPUNCTURE: CPT

## 2024-05-19 PROCEDURE — 96360 HYDRATION IV INFUSION INIT: CPT

## 2024-05-19 PROCEDURE — 84484 ASSAY OF TROPONIN QUANT: CPT

## 2024-05-19 NOTE — ED ADULT NURSE REASSESSMENT NOTE - NS ED NURSE REASSESS COMMENT FT1
MD aware of all results. call placed to [facility]; telephone report given. pt awaits  for transport back to [facility]
received report and assumed care of pt at change of shift. pt awake and alert. remains confused 2nd to h/o dementia. pt found with IV out

## 2024-09-10 NOTE — DISCHARGE NOTE NURSING/CASE MANAGEMENT/SOCIAL WORK - HAS THE PATIENT USED TOBACCO IN THE PAST 30 DAYS?
[Postpartum Follow Up] : postpartum follow up [Complications:___] : complications include: [unfilled] [Delivery Date: ___] : on [unfilled] [Primary C/S] : delivered by  section [Male] : Delivery History: baby boy [Wt. ___] : weighing [unfilled] [Breastfeeding] : not currently nursing [Discharge HGB: ___] : hemoglobin level was [unfilled] [None] : No associated symptoms are reported [Clean/Dry/Intact] : clean, dry and intact [Erythema] : not erythematous [Dehiscence] : not dehisced [Doing Well] : is doing well [No Sign of Infection] : is showing no signs of infection [FreeTextEntry8] : 22yo  s/p Emergent CS () for cord prolapse in labor (PEC)- presents for pp incision check. Pt without complaint.  Denies pain/ fever/ chills.  Not on BP meds. No [de-identified] : 24yo P1 s/p CS (cord prolapse)- for post op incision check [de-identified] : RTC 4 wks for full exam  [ ]CBC then.   [ ]needs cardioOb consult (email request sent)  Abbi Polanco, PAC

## 2024-10-14 NOTE — H&P ADULT - CONSTITUTIONAL
Medication: see request passed protocol.   Last office visit date: 8/12/2024    Next appointment scheduled?: Yes 11/27/2024   Number of refills given: see chart, filled per md protocol     detailed exam

## 2024-11-19 NOTE — DISCHARGE NOTE PROVIDER - PROVIDER RX CONTACT NUMBER
Pulmonary & Critical Care Medicine ICU Progress Note  Chief complaint : Hypertensive emergency    Subjunctive/24 hour events :   Patient seen and examined during multidisciplinary rounds with RN, charge nurse, RT, pharmacy, dietitian, and social service.   Still on nicardipine drip, currently on 2.5 mg/h, she has no chest pain, no headache, no dizziness, she still weak in the right lower extremity however no worsening neurosymptoms.  No blurry vision or double vision,She is on room air saturation 96%, no fever, urine output 500 cc.        New information updated in the note today, rest of the examination did not change compared to yesterday.  Social History     Tobacco Use    Smoking status: Former     Types: Cigarettes    Smokeless tobacco: Never   Substance Use Topics    Alcohol use: Yes     No family history on file.    No results for input(s): \"PHART\", \"FGE3SPO\", \"PO2ART\" in the last 72 hours.    MV Settings:     / / /            IV:   sodium chloride      niCARdipine 2.5 mg/hr (11/19/24 0718)       Vitals:  /63   Pulse 72   Temp 98.4 °F (36.9 °C) (Oral)   Resp 14   Ht 1.702 m (5' 7\")   Wt 52.6 kg (116 lb)   SpO2 92%   BMI 18.17 kg/m²    Tmax:        Intake/Output Summary (Last 24 hours) at 11/19/2024 0831  Last data filed at 11/19/2024 0718  Gross per 24 hour   Intake 711.93 ml   Output 500 ml   Net 211.93 ml       EXAM:  General: alert, cooperative, no distress  Head: normocephalic, atraumatic  Eyes:No gross abnormalities.  ENT:  MMM no lesions  Neck:  supple and no masses  Chest : clear to auscultation bilaterally- no wheezes, rales or rhonchi, normal air movement, no respiratory distress  Heart:: Heart sounds are normal.  Regular rate and rhythm without murmur, gallop or rub.  ABD:  symmetric, soft, non-tender  Musculoskeletal : no cyanosis, no clubbing, and no edema  Neuro: No acute change, right lower extremity weak/paralyzed  Skin: No rashes or nodules noted.  Lymph node:  no cervical  potential for life-threatening deterioration due to hypertensive emergency I spent 35  minutes providing critical care.  This time is excluding time spent performing procedures.          Electronically signed by Wili Conner MD,  FCCP ,on 11/19/2024 at 8:31 AM     [phone]

## 2025-01-31 NOTE — ASU PATIENT PROFILE, ADULT - NS TRANSFER DENTURES
2025    Britanyrupesh Avelar  [address] [address]    Patient: Venu Pizarro   YOB: 1968   Date of Visit: 2025     Encounter Diagnosis     ICD-10-CM    1. Closed traumatic nondisplaced fracture of proximal end of left humerus with routine healing, subsequent encounter  S4 PT plan of care cert/re-cert      2. Chronic pain of right knee  M25.561 PT plan of care cert/re-cert    G89.29       3. Chronic pain of left knee  M25.562 PT plan of care cert/re-cert    G89.29           Dear Dr. Avelar:    Thank you for your recent referral of Venu Pizarro. Please review the attached evaluation summary from Venu's recent visit.     Please verify that you agree with the plan of care by signing the attached order.     If you have any questions or concerns, please do not hesitate to call.     I sincerely appreciate the opportunity to share in the care of one of your patients and hope to have another opportunity to work with you in the near future.       Sincerely,    Ladan Hughes, PT      Referring Provider:      I certify that I have read the below Plan of Care and certify the need for these services furnished under this plan of treatment while under my care.                    Britany Avelar  [address] [address]  Via Fax: [phone]          PT Re-Evaluation     Today's date: 2025  Patient name: Venu Pizarro  : 1968  MRN: [id number]  Referring provider: Lala Chan PA-C  Dx:   Encounter Diagnosis     ICD-10-CM    1. Closed traumatic nondisplaced fracture of proximal end of left humerus with routine healing, subsequent encounter  S4       2. Chronic pain of right knee  M25.561     G89.29       3. Chronic pain of left knee  M25.562     G89.29                      Assessment  Impairments: abnormal or restricted ROM, activity intolerance, impaired physical strength, lacks appropriate home exercise program, pain with  "function and poor posture     Assessment details: Since starting skilled PT, L shoulder ROM and strength are improving, with increasing L elbow pain. Pt is making gradual gains with L shoulder functional activities.    Pt arrives to skilled PT with a script for B knee pain from his rheumatologist. Pt presents with increased pain and TTP, decreased B knee ROM, decreased B LE strength and decreased functional activities. Recommend pt initiate skilled PT to address these deficits and promote return to maximal functional activities.  Barriers to therapy: High insurance cost  Understanding of Dx/Px/POC: good     Prognosis: good    Goals  STG's ( 3-4 weeks)  1. Pt will be independent in HEP- met  2. Pt will be able to perform dressing and self care activities with greater ease- met  LTG's ( 6- 8 weeks)  1. Improve FOTO score by 8-10 points- partial met  2. Pt will have decreased pain to 2/10 at worst- not met  3. Pt will have improved L shoulder strength by 1/2 grade- partial met  4. Pt will be able to perform household lifting activities- not met    Plan  Patient would benefit from: PT eval and skilled physical therapy  Planned modality interventions: cryotherapy    Planned therapy interventions: joint mobilization, manual therapy, neuromuscular re-education, strengthening, stretching, therapeutic activities, therapeutic exercise, functional ROM exercises, flexibility and home exercise program    Frequency: 2x week  Duration in weeks: 6  Plan of Care beginning date: 1/31/2025  Plan of Care expiration date: 3/14/2025  Treatment plan discussed with: PTA and patient        Subjective Evaluation    History of Present Illness  Date of onset: 11/17/2024  Mechanism of injury: trauma  Mechanism of injury: I.E; Pt fell in [employer] on 11/17/24, and fell onto his left shoulder and his arm was trapped under his body. X-ray testing showed \" Acute minimally displaced fracture of the humeral surgical neck. Glenohumeral joint alignment is " "maintained.\"     Pt was immobilized in a sling for 6 weeks. Pt has discontinued his sling use.  Pt is not able to lift his arm. Pt is not able to wash his hair. Pt just started using the computer keyboard for work activities, and needs to bend his body down with eating. Pt sleeps on his back and is currently sleeping on a reclining sofa. Pt is not able to sleep on his L side. Pt has just started to be able to write with his L hand. Pt is driving short, local distances.    25: Pt reports some days are better than others with eating depending on his elbow pain. Pt is writing better and using the computer keyboard. Pt notes that he has constant elbow pain. Pt is able to reach the back of his ear on the side of his ead. Pt is able to sleep on his R side. Pt feels his ROM is improving.    Pt reports  B knee pain is getting progressively worse over the past 6 months. Pt went to his rheumatologist and was told that he did not have much fluid. Pt received injections without significant relief and was given methotrexate. Pt believed X-ray testing showed OA.  Pt has increased difficulty transferring sit to stand to get out of a chair, car, or toilet seat. Pt sits a lot for work and he tries to get up every hour. Pt has increased difficulty going up an down the steps. Pt tends to go down the steps sideways. Pt is avoiding squatting and kneeling activities.     Work: ; works from home  Hobbies: none  Gait: no abnormalities  Patient Goals  Patient goals for therapy: decreased pain, increased strength, independence with ADLs/IADLs and increased motion    Pain  At best pain ratin  At worst pain ratin  Location: L arm/ pectoral region- can radiate down to the elbow;  B knees: 0/10 best  10/10 at worst  Quality: dull ache    Social Support  Lives with: spouse    Employment status: working  Hand dominance: left      Diagnostic Tests  X-ray: abnormal  Treatments  No previous or current " treatments        Objective     Neurological Testing     Sensation     Shoulder   Left Shoulder   Intact: light touch    Right Shoulder   Intact: Light touch    Reflexes   Left   Biceps (C5/C6): trace (1+)  Brachioradialis (C6): trace (1+)  Triceps (C7): trace (1+)    Right   Biceps (C5/C6): trace (1+)  Brachioradialis (C6): trace (1+)  Triceps (C7): trace (1+)    Active Range of Motion   Left Shoulder   Flexion: 108 degrees   Abduction: 80 degrees   External rotation 45°: 45 degrees   Internal rotation 45°: 75 degrees     Right Shoulder   Flexion: 142 degrees   Abduction: 125 degrees     Left Elbow   Flexion: 130 degrees   Extension: 0 degrees     Right Elbow   Normal active range of motion  Left Knee   Flexion: 120 degrees   Extension: 0 degrees     Right Knee   Flexion: 120 degrees   Extension: 0 degrees     Additional Active Range of Motion Details  Cervical ROM; limited with mild pain at end ROM SB and rotation  Posture: pt sit with rounded shoulders, moderate forward head and thoracic khyphosis  Mild TTP L pectoral region  (-) TTP L shoulder   (+) TTP B medial quad and joint line    Passive Range of Motion   Left Shoulder   Flexion: 130 degrees   Abduction: 124 degrees   External rotation 45°: 45 degrees   Internal rotation 45°: 75 degrees   Left Knee   Flexion: 122 degrees   Extension: 0 degrees     Right Knee   Flexion: 122 degrees   Extension: 0 degrees     Strength/Myotome Testing     Left Shoulder     Planes of Motion   Flexion: 4   Left shoulder abduction strength: NT 2* decreased ROm.   External rotation at 0°: 5   Internal rotation at 0°: 5     Right Shoulder   Normal muscle strength    Right Hip   Planes of Motion   Adduction: 4+  External rotation: 4+          Daily Treatment Diary     Precautions: none  CO-MORBIDITIES:  HEP ACCESS CODE: [id number]  FOTO Completed On: 1/31(Score:  60 Score Predication:  68 )             POC Expires Reeval for Medicare to be completed  Unit Limit Auth Expiration Date  "PT/OT/STVisit Limit   3/14/25 By visit n/a N/a 12/31/25 30    Completed on visit                    Auth Status DATE 1/14 1/17 1/21 1/24 1/28 1/31   Approved Visit # 3 4 5 6 7 8    Remaining 27 26 25 24 23 22   MANUAL THERAPY         L shoulder PROM/ MFR 8 8 8 2 2 10'   L elbow PROM 2 2 2 8 8    RE-eval      20'                              THERAPEUTIC EXERCISE HEP         Pulleys: flexion for ROM  3' 3' 3' 20x 20x 3'   Pulleys; scap for ROM  3' 3' 3' 20x 20x 3'   Pendulums; CW, CCW  2# 20 ea 2# 20 ea 3# 20 ea      Bike for LE ROM  5\" 10 5\" 20 5\" 20  np NV   Standing HR  5\" 10 5\" 20 5\" 20   NV   Seated cane ER AAROM  5\" 10 5\" 20 5\" 20      Standing gastroc stretch  5\" 10 2\" 20 5\" 20   NV   Walking holding wt at side to promote elbow ext  2# 2small laps 2# 3sm laps 3# 3sm laps 3# 3sm laps 4# 3sm laps Hold wt   L elbow flexion AROM/ AAROM  10 2# 20 3# 20  3# 20 4# 20    Hammer curls      4# 20    Shoulder blade squeezes  5\" 10 5\" 10 5\" 10      Supine shld flexion HH AAROM  1# 10 2# 20 3# 20   4# 20    L shoulder isometrics; all dir  5\" 10 5\" 10 5\" 10      S/l ER    0# 20      S/l Abd    0# 20      Supine punches       4# 20    bridges       10x5\"   Quad sets       10x5\"   Supine SLR       5 ea   S/l hip abd L only       10   NEUROMUSCULAR REEDUCATION           Bicep stretch     10\" 5 20\" 5    Wrist extension stretch     10\" 5 20\" 5                                                                                                                            THERAPEUTIC ACTIVITY          5[employer] placing on shelf flex/scap     3x ea 3x ea    Wall push ups     2x10 2x10    Wall slides on flex/scap      2x10              GAIT TRAINING                                                  MODALITIES         mhp    8' +hep                                           " Full/Lower

## 2025-03-05 NOTE — PHYSICAL THERAPY INITIAL EVALUATION ADULT - SITTING BALANCE: DYNAMIC
Topical Clindamycin Pregnancy And Lactation Text: This medication is Pregnancy Category B and is considered safe during pregnancy. It is unknown if it is excreted in breast milk. Birth Control Pills Pregnancy And Lactation Text: This medication should be avoided if pregnant and for the first 30 days post-partum. Minocycline Pregnancy And Lactation Text: This medication is Pregnancy Category D and not consider safe during pregnancy. It is also excreted in breast milk. Erythromycin Counseling:  I discussed with the patient the risks of erythromycin including but not limited to GI upset, allergic reaction, drug rash, diarrhea, increase in liver enzymes, and yeast infections. Bactrim Pregnancy And Lactation Text: This medication is Pregnancy Category D and is known to cause fetal risk.  It is also excreted in breast milk. Isotretinoin Counseling: Patient should get monthly blood tests, not donate blood, not drive at night if vision affected, not share medication, and not undergo elective surgery for 6 months after tx completed. Side effects reviewed, pt to contact office should one occur. Azelaic Acid Counseling: Patient counseled that medicine may cause skin irritation and to avoid applying near the eyes.  In the event of skin irritation, the patient was advised to reduce the amount of the drug applied or use it less frequently.   The patient verbalized understanding of the proper use and possible adverse effects of azelaic acid.  All of the patient's questions and concerns were addressed. Tazorac Pregnancy And Lactation Text: This medication is not safe during pregnancy. It is unknown if this medication is excreted in breast milk. Aklief counseling:  Patient advised to apply a pea-sized amount only at bedtime and wait 30 minutes after washing their face before applying.  If too drying, patient may add a non-comedogenic moisturizer.  The most commonly reported side effects including irritation, redness, scaling, dryness, stinging, burning, itching, and increased risk of sunburn.  The patient verbalized understanding of the proper use and possible adverse effects of retinoids.  All of the patient's questions and concerns were addressed. Winlevi Counseling:  I discussed with the patient the risks of topical clascoterone including but not limited to erythema, scaling, itching, and stinging. Patient voiced their understanding. Topical Retinoid Pregnancy And Lactation Text: This medication is Pregnancy Category C. It is unknown if this medication is excreted in breast milk. Include Pregnancy/Lactation Warning?: No Benzoyl Peroxide Pregnancy And Lactation Text: This medication is Pregnancy Category C. It is unknown if benzoyl peroxide is excreted in breast milk. Topical Sulfur Applications Counseling: Topical Sulfur Counseling: Patient counseled that this medication may cause skin irritation or allergic reactions.  In the event of skin irritation, the patient was advised to reduce the amount of the drug applied or use it less frequently.   The patient verbalized understanding of the proper use and possible adverse effects of topical sulfur application.  All of the patient's questions and concerns were addressed. Doxycycline Counseling:  Patient counseled regarding possible photosensitivity and increased risk for sunburn.  Patient instructed to avoid sunlight, if possible.  When exposed to sunlight, patients should wear protective clothing, sunglasses, and sunscreen.  The patient was instructed to call the office immediately if the following severe adverse effects occur:  hearing changes, easy bruising/bleeding, severe headache, or vision changes.  The patient verbalized understanding of the proper use and possible adverse effects of doxycycline.  All of the patient's questions and concerns were addressed. High Dose Vitamin A Pregnancy And Lactation Text: High dose vitamin A therapy is contraindicated during pregnancy and breast feeding. Azithromycin Pregnancy And Lactation Text: This medication is considered safe during pregnancy and is also secreted in breast milk. Tetracycline Counseling: Patient counseled regarding possible photosensitivity and increased risk for sunburn.  Patient instructed to avoid sunlight, if possible.  When exposed to sunlight, patients should wear protective clothing, sunglasses, and sunscreen.  The patient was instructed to call the office immediately if the following severe adverse effects occur:  hearing changes, easy bruising/bleeding, severe headache, or vision changes.  The patient verbalized understanding of the proper use and possible adverse effects of tetracycline.  All of the patient's questions and concerns were addressed. Patient understands to avoid pregnancy while on therapy due to potential birth defects. Birth Control Pills Counseling: Birth Control Pill Counseling: I discussed with the patient the potential side effects of OCPs including but not limited to increased risk of stroke, heart attack, thrombophlebitis, deep venous thrombosis, hepatic adenomas, breast changes, GI upset, headaches, and depression.  The patient verbalized understanding of the proper use and possible adverse effects of OCPs. All of the patient's questions and concerns were addressed. Isotretinoin Pregnancy And Lactation Text: This medication is Pregnancy Category X and is considered extremely dangerous during pregnancy. It is unknown if it is excreted in breast milk. Spironolactone Counseling: Patient advised regarding risks of diarrhea, abdominal pain, hyperkalemia, birth defects (for female patients), liver toxicity and renal toxicity. The patient may need blood work to monitor liver and kidney function and potassium levels while on therapy. The patient verbalized understanding of the proper use and possible adverse effects of spironolactone.  All of the patient's questions and concerns were addressed. Azelaic Acid Pregnancy And Lactation Text: This medication is considered safe during pregnancy and breast feeding. Dapsone Counseling: I discussed with the patient the risks of dapsone including but not limited to hemolytic anemia, agranulocytosis, rashes, methemoglobinemia, kidney failure, peripheral neuropathy, headaches, GI upset, and liver toxicity.  Patients who start dapsone require monitoring including baseline LFTs and weekly CBCs for the first month, then every month thereafter.  The patient verbalized understanding of the proper use and possible adverse effects of dapsone.  All of the patient's questions and concerns were addressed. Tazorac Counseling:  Patient advised that medication is irritating and drying.  Patient may need to apply sparingly and wash off after an hour before eventually leaving it on overnight.  The patient verbalized understanding of the proper use and possible adverse effects of tazorac.  All of the patient's questions and concerns were addressed. Aklief Pregnancy And Lactation Text: It is unknown if this medication is safe to use during pregnancy.  It is unknown if this medication is excreted in breast milk.  Breastfeeding women should use the topical cream on the smallest area of the skin for the shortest time needed while breastfeeding.  Do not apply to nipple and areola. Sarecycline Counseling: Patient advised regarding possible photosensitivity and discoloration of the teeth, skin, lips, tongue and gums.  Patient instructed to avoid sunlight, if possible.  When exposed to sunlight, patients should wear protective clothing, sunglasses, and sunscreen.  The patient was instructed to call the office immediately if the following severe adverse effects occur:  hearing changes, easy bruising/bleeding, severe headache, or vision changes.  The patient verbalized understanding of the proper use and possible adverse effects of sarecycline.  All of the patient's questions and concerns were addressed. Detail Level: Zone Topical Clindamycin Counseling: Patient counseled that this medication may cause skin irritation or allergic reactions.  In the event of skin irritation, the patient was advised to reduce the amount of the drug applied or use it less frequently.   The patient verbalized understanding of the proper use and possible adverse effects of clindamycin.  All of the patient's questions and concerns were addressed. Erythromycin Pregnancy And Lactation Text: This medication is Pregnancy Category B and is considered safe during pregnancy. It is also excreted in breast milk. Azithromycin Counseling:  I discussed with the patient the risks of azithromycin including but not limited to GI upset, allergic reaction, drug rash, diarrhea, and yeast infections. Doxycycline Pregnancy And Lactation Text: This medication is Pregnancy Category D and not consider safe during pregnancy. It is also excreted in breast milk but is considered safe for shorter treatment courses. Minocycline Counseling: Patient advised regarding possible photosensitivity and discoloration of the teeth, skin, lips, tongue and gums.  Patient instructed to avoid sunlight, if possible.  When exposed to sunlight, patients should wear protective clothing, sunglasses, and sunscreen.  The patient was instructed to call the office immediately if the following severe adverse effects occur:  hearing changes, easy bruising/bleeding, severe headache, or vision changes.  The patient verbalized understanding of the proper use and possible adverse effects of minocycline.  All of the patient's questions and concerns were addressed. Winlevi Pregnancy And Lactation Text: This medication is considered safe during pregnancy and breastfeeding. Bactrim Counseling:  I discussed with the patient the risks of sulfa antibiotics including but not limited to GI upset, allergic reaction, drug rash, diarrhea, dizziness, photosensitivity, and yeast infections.  Rarely, more serious reactions can occur including but not limited to aplastic anemia, agranulocytosis, methemoglobinemia, blood dyscrasias, liver or kidney failure, lung infiltrates or desquamative/blistering drug rashes. Spironolactone Pregnancy And Lactation Text: This medication can cause feminization of the male fetus and should be avoided during pregnancy. The active metabolite is also found in breast milk. Benzoyl Peroxide Counseling: Patient counseled that medicine may cause skin irritation and bleach clothing.  In the event of skin irritation, the patient was advised to reduce the amount of the drug applied or use it less frequently.   The patient verbalized understanding of the proper use and possible adverse effects of benzoyl peroxide.  All of the patient's questions and concerns were addressed. High Dose Vitamin A Counseling: Side effects reviewed, pt to contact office should one occur. Dapsone Pregnancy And Lactation Text: This medication is Pregnancy Category C and is not considered safe during pregnancy or breast feeding. Topical Retinoid counseling:  Patient advised to apply a pea-sized amount only at bedtime and wait 30 minutes after washing their face before applying.  If too drying, patient may add a non-comedogenic moisturizer. The patient verbalized understanding of the proper use and possible adverse effects of retinoids.  All of the patient's questions and concerns were addressed. Topical Sulfur Applications Pregnancy And Lactation Text: This medication is Pregnancy Category C and has an unknown safety profile during pregnancy. It is unknown if this topical medication is excreted in breast milk. normal balance